# Patient Record
Sex: MALE | Race: ASIAN | NOT HISPANIC OR LATINO | URBAN - METROPOLITAN AREA
[De-identification: names, ages, dates, MRNs, and addresses within clinical notes are randomized per-mention and may not be internally consistent; named-entity substitution may affect disease eponyms.]

---

## 2018-03-07 ENCOUNTER — EMERGENCY (EMERGENCY)
Facility: HOSPITAL | Age: 48
LOS: 1 days | Discharge: HOME | End: 2018-03-07
Attending: EMERGENCY MEDICINE | Admitting: EMERGENCY MEDICINE
Payer: SELF-PAY

## 2018-03-07 VITALS
HEART RATE: 81 BPM | DIASTOLIC BLOOD PRESSURE: 104 MMHG | OXYGEN SATURATION: 100 % | RESPIRATION RATE: 16 BRPM | SYSTOLIC BLOOD PRESSURE: 146 MMHG

## 2018-03-07 VITALS
DIASTOLIC BLOOD PRESSURE: 95 MMHG | TEMPERATURE: 98 F | RESPIRATION RATE: 17 BRPM | HEART RATE: 86 BPM | OXYGEN SATURATION: 100 % | SYSTOLIC BLOOD PRESSURE: 149 MMHG

## 2018-03-07 PROCEDURE — 99053 MED SERV 10PM-8AM 24 HR FAC: CPT

## 2018-03-07 PROCEDURE — 99284 EMERGENCY DEPT VISIT MOD MDM: CPT | Mod: 25

## 2018-03-07 RX ORDER — CYCLOBENZAPRINE HYDROCHLORIDE 10 MG/1
1 TABLET, FILM COATED ORAL
Qty: 21 | Refills: 0 | OUTPATIENT
Start: 2018-03-07 | End: 2018-03-13

## 2018-03-07 RX ORDER — KETOROLAC TROMETHAMINE 30 MG/ML
1 SYRINGE (ML) INJECTION
Qty: 21 | Refills: 0 | OUTPATIENT
Start: 2018-03-07 | End: 2018-03-13

## 2018-03-07 RX ORDER — ACETAMINOPHEN 500 MG
975 TABLET ORAL ONCE
Qty: 0 | Refills: 0 | Status: COMPLETED | OUTPATIENT
Start: 2018-03-07 | End: 2018-03-07

## 2018-03-07 RX ORDER — KETOROLAC TROMETHAMINE 30 MG/ML
30 SYRINGE (ML) INJECTION ONCE
Qty: 0 | Refills: 0 | Status: DISCONTINUED | OUTPATIENT
Start: 2018-03-07 | End: 2018-03-07

## 2018-03-07 RX ORDER — MORPHINE SULFATE 50 MG/1
2 CAPSULE, EXTENDED RELEASE ORAL ONCE
Qty: 0 | Refills: 0 | Status: DISCONTINUED | OUTPATIENT
Start: 2018-03-07 | End: 2018-03-07

## 2018-03-07 RX ORDER — KETOROLAC TROMETHAMINE 30 MG/ML
60 SYRINGE (ML) INJECTION ONCE
Qty: 0 | Refills: 0 | Status: DISCONTINUED | OUTPATIENT
Start: 2018-03-07 | End: 2018-03-07

## 2018-03-07 RX ADMIN — MORPHINE SULFATE 2 MILLIGRAM(S): 50 CAPSULE, EXTENDED RELEASE ORAL at 10:01

## 2018-03-07 RX ADMIN — Medication 975 MILLIGRAM(S): at 08:44

## 2018-03-07 RX ADMIN — MORPHINE SULFATE 2 MILLIGRAM(S): 50 CAPSULE, EXTENDED RELEASE ORAL at 10:20

## 2018-03-07 RX ADMIN — Medication 30 MILLIGRAM(S): at 10:01

## 2018-03-07 RX ADMIN — Medication 30 MILLIGRAM(S): at 08:44

## 2018-03-07 NOTE — ED PROVIDER NOTE - PROGRESS NOTE DETAILS
moving more freely, able to sit up w legs on side of bed, still in pain, will give 2mg morphine , reeval pt able to walk in ED w/o assistance, fu with ortho/pmd, work note Pt able to walk, good distal sensation in bilateral legs and feet, feels significantly improved. Will provide week long prescription for flexeril / toradol po; f/u pmd, return precautions advised. USAMA Cruz PGY1

## 2018-03-07 NOTE — ED PROVIDER NOTE - NS ED ROS FT
General: No fevers / chills  HENT: No ear pain, runny nose, or sore throat  Eyes: No visual changes  CP: No chest pain, palpitations, or light headedness  Resp: No shortness of breath, no cough  GI: No abdominal pain, diarrhea, constipation, nausea, or vomiting  : No dysuria or hematuria  Neuro: No numbness, tingling, or weakness  Endo: No hx of diabetes  Heme: No hx of easy bleeding or bruising

## 2018-03-07 NOTE — ED PROVIDER NOTE - ATTENDING CONTRIBUTION TO CARE
Attending Attestation: Dr. Angel  I have personally performed a history and physical examination of the patient and discussed management with the resident as well as the patient.  I reviewed the resident's note and agree with the documented findings and plan of care.  I have authored and modified critical sections of the Provider Note, including but not limited to HPI, Physical Exam and MDM.  dr rashmi white  45 yo male bending down yesterday felt pain rt side of back, co stiffness pain, no incontinence, no perineal anesthesia, no le paresthesias, worse w mvmt.  PE sitting up nad, pain w mvmt, no midline no bony tender, reproducible lat lumbar tender, inc w RLE mvmt, distal nvi  imp/plan back pain, toradol, valium, reeval, if improved dc home on toradol, flexeril fu with pmd

## 2018-03-07 NOTE — ED PROVIDER NOTE - MEDICAL DECISION MAKING DETAILS
47M o/w healthy presenting with acute onset low back strain, has tried 400mg advil at home with no relief. Does appear significantly uncomfortable, will dose with toradol, valium, apap, and reassess. Do not suspect cord impingement or any fractures based on physical examination and history at this time.

## 2018-03-07 NOTE — ED ADULT TRIAGE NOTE - CHIEF COMPLAINT QUOTE
as per pt, he was trying to put on his socks and felt the muscle pulled and since then pt. c/o pain on mid lower back, non-radiating. denies any numbness/weakness, c/o difficulty walking this morning due to pain. denies any PMHx

## 2018-03-07 NOTE — ED PROVIDER NOTE - PHYSICAL EXAMINATION
Gen: NAD, uncomfortable appearing, non-toxic, conversational  Eyes: PERRL, EOMI   HENT: Normocephalic, atraumatic. External ears normal, no rhinorrhea, moist mucous membranes.   CV: RRR, no M/R/G  Resp: CTAB, non-labored, speaking without difficulty on room air  Abd: soft, non tender, non rigid, no guarding or rebound tenderness  Back: No CVAT bilaterally, no midline ttp, +paraspinal lumbar/sacral tenderness, +increased pain with leg raise bilaterally, pain reproduced with leaning forward   Skin: dry, wwp   Neuro: AOx3, speech is fluent and appropriate, motor 5/5 & symmetric in BLE, sensation intact  Psych: Mood concerned, affect euthymic

## 2019-04-05 ENCOUNTER — INPATIENT (INPATIENT)
Facility: HOSPITAL | Age: 49
LOS: 2 days | Discharge: HOME | End: 2019-04-08
Attending: STUDENT IN AN ORGANIZED HEALTH CARE EDUCATION/TRAINING PROGRAM | Admitting: STUDENT IN AN ORGANIZED HEALTH CARE EDUCATION/TRAINING PROGRAM
Payer: COMMERCIAL

## 2019-04-05 VITALS
RESPIRATION RATE: 18 BRPM | HEART RATE: 95 BPM | OXYGEN SATURATION: 99 % | TEMPERATURE: 99 F | DIASTOLIC BLOOD PRESSURE: 115 MMHG | SYSTOLIC BLOOD PRESSURE: 165 MMHG

## 2019-04-05 DIAGNOSIS — Z29.9 ENCOUNTER FOR PROPHYLACTIC MEASURES, UNSPECIFIED: ICD-10-CM

## 2019-04-05 DIAGNOSIS — E11.9 TYPE 2 DIABETES MELLITUS WITHOUT COMPLICATIONS: ICD-10-CM

## 2019-04-05 DIAGNOSIS — R51 HEADACHE: ICD-10-CM

## 2019-04-05 DIAGNOSIS — F10.10 ALCOHOL ABUSE, UNCOMPLICATED: ICD-10-CM

## 2019-04-05 DIAGNOSIS — R25.1 TREMOR, UNSPECIFIED: ICD-10-CM

## 2019-04-05 DIAGNOSIS — E11.65 TYPE 2 DIABETES MELLITUS WITH HYPERGLYCEMIA: ICD-10-CM

## 2019-04-05 DIAGNOSIS — R07.89 OTHER CHEST PAIN: ICD-10-CM

## 2019-04-05 DIAGNOSIS — I10 ESSENTIAL (PRIMARY) HYPERTENSION: ICD-10-CM

## 2019-04-05 LAB
ALBUMIN SERPL ELPH-MCNC: 4.5 G/DL — SIGNIFICANT CHANGE UP (ref 3.3–5)
ALP SERPL-CCNC: 103 U/L — SIGNIFICANT CHANGE UP (ref 40–120)
ALT FLD-CCNC: 28 U/L — SIGNIFICANT CHANGE UP (ref 4–41)
ANION GAP SERPL CALC-SCNC: 16 MMO/L — HIGH (ref 7–14)
APPEARANCE UR: CLEAR — SIGNIFICANT CHANGE UP
AST SERPL-CCNC: 23 U/L — SIGNIFICANT CHANGE UP (ref 4–40)
B-OH-BUTYR SERPL-SCNC: 0.7 MMOL/L — HIGH (ref 0–0.4)
BACTERIA # UR AUTO: NEGATIVE — SIGNIFICANT CHANGE UP
BASE EXCESS BLDV CALC-SCNC: 7.7 MMOL/L — SIGNIFICANT CHANGE UP
BASOPHILS # BLD AUTO: 0.03 K/UL — SIGNIFICANT CHANGE UP (ref 0–0.2)
BASOPHILS NFR BLD AUTO: 0.4 % — SIGNIFICANT CHANGE UP (ref 0–2)
BILIRUB SERPL-MCNC: 0.6 MG/DL — SIGNIFICANT CHANGE UP (ref 0.2–1.2)
BILIRUB UR-MCNC: NEGATIVE — SIGNIFICANT CHANGE UP
BLOOD GAS VENOUS - CREATININE: 0.52 MG/DL — SIGNIFICANT CHANGE UP (ref 0.5–1.3)
BLOOD UR QL VISUAL: NEGATIVE — SIGNIFICANT CHANGE UP
BUN SERPL-MCNC: 9 MG/DL — SIGNIFICANT CHANGE UP (ref 7–23)
CALCIUM SERPL-MCNC: 9.6 MG/DL — SIGNIFICANT CHANGE UP (ref 8.4–10.5)
CHLORIDE BLDV-SCNC: 94 MMOL/L — LOW (ref 96–108)
CHLORIDE SERPL-SCNC: 88 MMOL/L — LOW (ref 98–107)
CO2 SERPL-SCNC: 26 MMOL/L — SIGNIFICANT CHANGE UP (ref 22–31)
COLOR SPEC: SIGNIFICANT CHANGE UP
CREAT SERPL-MCNC: 0.53 MG/DL — SIGNIFICANT CHANGE UP (ref 0.5–1.3)
EOSINOPHIL # BLD AUTO: 0.02 K/UL — SIGNIFICANT CHANGE UP (ref 0–0.5)
EOSINOPHIL NFR BLD AUTO: 0.3 % — SIGNIFICANT CHANGE UP (ref 0–6)
GAS PNL BLDV: 128 MMOL/L — LOW (ref 136–146)
GLUCOSE BLDV-MCNC: 343 — HIGH (ref 70–99)
GLUCOSE SERPL-MCNC: 323 MG/DL — HIGH (ref 70–99)
GLUCOSE UR-MCNC: >1000 — HIGH
HBA1C BLD-MCNC: 12.9 % — HIGH (ref 4–5.6)
HCO3 BLDV-SCNC: 28 MMOL/L — HIGH (ref 20–27)
HCT VFR BLD CALC: 49.5 % — SIGNIFICANT CHANGE UP (ref 39–50)
HCT VFR BLDV CALC: 53.3 % — HIGH (ref 39–51)
HGB BLD-MCNC: 17 G/DL — SIGNIFICANT CHANGE UP (ref 13–17)
HGB BLDV-MCNC: 17.4 G/DL — HIGH (ref 13–17)
HYALINE CASTS # UR AUTO: NEGATIVE — SIGNIFICANT CHANGE UP
IMM GRANULOCYTES NFR BLD AUTO: 1.1 % — SIGNIFICANT CHANGE UP (ref 0–1.5)
KETONES UR-MCNC: SIGNIFICANT CHANGE UP
LACTATE BLDV-MCNC: 2 MMOL/L — SIGNIFICANT CHANGE UP (ref 0.5–2)
LEUKOCYTE ESTERASE UR-ACNC: NEGATIVE — SIGNIFICANT CHANGE UP
LYMPHOCYTES # BLD AUTO: 2.35 K/UL — SIGNIFICANT CHANGE UP (ref 1–3.3)
LYMPHOCYTES # BLD AUTO: 30.9 % — SIGNIFICANT CHANGE UP (ref 13–44)
MAGNESIUM SERPL-MCNC: 2.1 MG/DL — SIGNIFICANT CHANGE UP (ref 1.6–2.6)
MCHC RBC-ENTMCNC: 30.3 PG — SIGNIFICANT CHANGE UP (ref 27–34)
MCHC RBC-ENTMCNC: 34.3 % — SIGNIFICANT CHANGE UP (ref 32–36)
MCV RBC AUTO: 88.2 FL — SIGNIFICANT CHANGE UP (ref 80–100)
MONOCYTES # BLD AUTO: 0.53 K/UL — SIGNIFICANT CHANGE UP (ref 0–0.9)
MONOCYTES NFR BLD AUTO: 7 % — SIGNIFICANT CHANGE UP (ref 2–14)
NEUTROPHILS # BLD AUTO: 4.6 K/UL — SIGNIFICANT CHANGE UP (ref 1.8–7.4)
NEUTROPHILS NFR BLD AUTO: 60.3 % — SIGNIFICANT CHANGE UP (ref 43–77)
NITRITE UR-MCNC: NEGATIVE — SIGNIFICANT CHANGE UP
NRBC # FLD: 0 K/UL — SIGNIFICANT CHANGE UP (ref 0–0)
PCO2 BLDV: 56 MMHG — HIGH (ref 41–51)
PH BLDV: 7.39 PH — SIGNIFICANT CHANGE UP (ref 7.32–7.43)
PH UR: 7 — SIGNIFICANT CHANGE UP (ref 5–8)
PLATELET # BLD AUTO: 200 K/UL — SIGNIFICANT CHANGE UP (ref 150–400)
PMV BLD: 10 FL — SIGNIFICANT CHANGE UP (ref 7–13)
PO2 BLDV: 25 MMHG — LOW (ref 35–40)
POTASSIUM BLDV-SCNC: 4 MMOL/L — SIGNIFICANT CHANGE UP (ref 3.4–4.5)
POTASSIUM SERPL-MCNC: 4.2 MMOL/L — SIGNIFICANT CHANGE UP (ref 3.5–5.3)
POTASSIUM SERPL-SCNC: 4.2 MMOL/L — SIGNIFICANT CHANGE UP (ref 3.5–5.3)
PROT SERPL-MCNC: 7.9 G/DL — SIGNIFICANT CHANGE UP (ref 6–8.3)
PROT UR-MCNC: 20 — SIGNIFICANT CHANGE UP
RBC # BLD: 5.61 M/UL — SIGNIFICANT CHANGE UP (ref 4.2–5.8)
RBC # FLD: 11.9 % — SIGNIFICANT CHANGE UP (ref 10.3–14.5)
RBC CASTS # UR COMP ASSIST: SIGNIFICANT CHANGE UP (ref 0–?)
SAO2 % BLDV: 41.3 % — LOW (ref 60–85)
SODIUM SERPL-SCNC: 130 MMOL/L — LOW (ref 135–145)
SP GR SPEC: > 1.04 — HIGH (ref 1–1.04)
SQUAMOUS # UR AUTO: SIGNIFICANT CHANGE UP
TROPONIN T, HIGH SENSITIVITY: < 6 NG/L — SIGNIFICANT CHANGE UP (ref ?–14)
UROBILINOGEN FLD QL: NORMAL — SIGNIFICANT CHANGE UP
WBC # BLD: 7.61 K/UL — SIGNIFICANT CHANGE UP (ref 3.8–10.5)
WBC # FLD AUTO: 7.61 K/UL — SIGNIFICANT CHANGE UP (ref 3.8–10.5)
WBC UR QL: SIGNIFICANT CHANGE UP (ref 0–?)

## 2019-04-05 PROCEDURE — 71045 X-RAY EXAM CHEST 1 VIEW: CPT | Mod: 26

## 2019-04-05 PROCEDURE — 99223 1ST HOSP IP/OBS HIGH 75: CPT | Mod: GC

## 2019-04-05 RX ORDER — SODIUM CHLORIDE 9 MG/ML
1000 INJECTION INTRAMUSCULAR; INTRAVENOUS; SUBCUTANEOUS ONCE
Qty: 0 | Refills: 0 | Status: COMPLETED | OUTPATIENT
Start: 2019-04-05 | End: 2019-04-05

## 2019-04-05 RX ORDER — ENOXAPARIN SODIUM 100 MG/ML
40 INJECTION SUBCUTANEOUS EVERY 24 HOURS
Qty: 0 | Refills: 0 | Status: DISCONTINUED | OUTPATIENT
Start: 2019-04-05 | End: 2019-04-08

## 2019-04-05 RX ORDER — LISINOPRIL 2.5 MG/1
5 TABLET ORAL DAILY
Qty: 0 | Refills: 0 | Status: DISCONTINUED | OUTPATIENT
Start: 2019-04-05 | End: 2019-04-08

## 2019-04-05 RX ORDER — INFLUENZA VIRUS VACCINE 15; 15; 15; 15 UG/.5ML; UG/.5ML; UG/.5ML; UG/.5ML
0.5 SUSPENSION INTRAMUSCULAR ONCE
Qty: 0 | Refills: 0 | Status: DISCONTINUED | OUTPATIENT
Start: 2019-04-05 | End: 2019-04-08

## 2019-04-05 RX ORDER — SODIUM CHLORIDE 9 MG/ML
1000 INJECTION, SOLUTION INTRAVENOUS
Qty: 0 | Refills: 0 | Status: DISCONTINUED | OUTPATIENT
Start: 2019-04-05 | End: 2019-04-08

## 2019-04-05 RX ORDER — INSULIN LISPRO 100/ML
VIAL (ML) SUBCUTANEOUS
Qty: 0 | Refills: 0 | Status: DISCONTINUED | OUTPATIENT
Start: 2019-04-05 | End: 2019-04-05

## 2019-04-05 RX ORDER — INSULIN LISPRO 100/ML
VIAL (ML) SUBCUTANEOUS
Qty: 0 | Refills: 0 | Status: DISCONTINUED | OUTPATIENT
Start: 2019-04-05 | End: 2019-04-08

## 2019-04-05 RX ORDER — INSULIN LISPRO 100/ML
VIAL (ML) SUBCUTANEOUS AT BEDTIME
Qty: 0 | Refills: 0 | Status: DISCONTINUED | OUTPATIENT
Start: 2019-04-05 | End: 2019-04-05

## 2019-04-05 RX ORDER — INSULIN LISPRO 100/ML
VIAL (ML) SUBCUTANEOUS AT BEDTIME
Qty: 0 | Refills: 0 | Status: DISCONTINUED | OUTPATIENT
Start: 2019-04-05 | End: 2019-04-08

## 2019-04-05 RX ORDER — INSULIN LISPRO 100/ML
2 VIAL (ML) SUBCUTANEOUS ONCE
Qty: 0 | Refills: 0 | Status: COMPLETED | OUTPATIENT
Start: 2019-04-05 | End: 2019-04-05

## 2019-04-05 RX ADMIN — LISINOPRIL 5 MILLIGRAM(S): 2.5 TABLET ORAL at 20:09

## 2019-04-05 RX ADMIN — SODIUM CHLORIDE 100 MILLILITER(S): 9 INJECTION, SOLUTION INTRAVENOUS at 21:43

## 2019-04-05 RX ADMIN — SODIUM CHLORIDE 1000 MILLILITER(S): 9 INJECTION INTRAMUSCULAR; INTRAVENOUS; SUBCUTANEOUS at 15:22

## 2019-04-05 RX ADMIN — Medication 25 MILLIGRAM(S): at 15:57

## 2019-04-05 RX ADMIN — SODIUM CHLORIDE 1000 MILLILITER(S): 9 INJECTION INTRAMUSCULAR; INTRAVENOUS; SUBCUTANEOUS at 16:58

## 2019-04-05 RX ADMIN — Medication 2 UNIT(S): at 20:06

## 2019-04-05 NOTE — H&P ADULT - ASSESSMENT
49 yo M PMHx of HTN, HLD, T2DM p/w headache, blurry vision, found to have uncontrolled T2DM (a1c 12.9%) w/ FS 300s and r/o etOH withdrawal

## 2019-04-05 NOTE — ED ADULT NURSE NOTE - OBJECTIVE STATEMENT
Pt rcvd to room 26 c/o dizziness, neck pain/back pain x 3 weeks. Also reports having tremors. Pt states he does not drink daily but when he does, he will drink heavily for 3-4 days consecutively. As per pt hasn't drank in 2 weeks but reports having 2 beers last night. Denies vision changes, HA at this time. Tremors felt only at fingertips. Aox3, ambulatory. Denies pmhx. Hypertensive at this time. No neuro deficits observed. Awaiting MD nixon. Sinus tachycardia on cardiac monitor. Respirations even/unlabored. Will continue to monitor.

## 2019-04-05 NOTE — H&P ADULT - HISTORY OF PRESENT ILLNESS
:  b48 y/o M w/ PMH HTN, HLD p/w headache, dizziness over last 3 weeks. Patient reports he has been having "dizziness in his head" over last 3 weeks. He states this feeling tends to occur when he walks. He reports a pain in the back of his head which migrates up to the top of his head. These symptoms have been occurring nearly every day over the last several weeks. He has also been having blurry vision which has been occurring intermittently. Denies room spinning sensation, tinnitus, or focal weakness. He reports some chest pain intermittently, not usually associated with exertion, accompanied by SOB. 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 Reports multi-day binge drinking episodes, about 1 pint of vodka and 1-2 6packs of beers 3-4x per month. He denies tobacco use. Lives with wife at home. Last drink yesterday evening, had 2 beers. Had an episode of vomiting a few days ago. Denies any diarrhea, nausea, dysuria.     In the ED:  Vitals: afeb, /115, RR 18, spO2 99% on RA  Labs: WBC 7.6, PLAT 200, Cr 0.53, , AG 16, A1C 12.9%, Urine glucose >1000  Imaging: CXR clear  Given Librium 25mg x1 and 2L NS in ED 47 y/o M w/ PMH HTN, HLD p/w headache, dizziness over last 3 weeks. Patient reports he has been having "dizziness in his head" over last 3 weeks. He states this feeling tends to occur when he walks. He reports a pain in the back of his head which migrates up to the top of his head. These symptoms have been occurring nearly every day over the last several weeks. He has also been having blurry vision which has been occurring intermittently. Denies room spinning sensation, tinnitus, or focal weakness. He reports some chest pain intermittently, not usually associated with exertion, accompanied by SOB. 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 Reports multi-day binge drinking episodes, about 1 pint of vodka and 1-2 6packs of beers 3-4x per month. He denies tobacco use. Lives with wife at home. Last drink yesterday evening, had 2 beers. Had an episode of vomiting a few days ago. Denies any diarrhea, nausea, dysuria.     In the ED:  Vitals: afeb, /115, RR 18, spO2 99% on RA  Labs: WBC 7.6, PLAT 200, Cr 0.53, , AG 16, A1C 12.9%, Urine glucose >1000  Imaging: CXR clear  Given Librium 25mg x1 and 2L NS in ED 47 y/o M w/ PMH HTN, HLD p/w headache, dizziness over last 3 weeks. Patient reports he has been having "dizziness in his head" over last 3 weeks. He states this feeling tends to occur when he walks. He reports a pain in the back of his head which migrates up to the top of his head. These symptoms have been occurring nearly every day over the last several weeks. He has also been having blurry vision which has been occurring intermittently. Denies room spinning sensation, tinnitus, or focal weakness. He reports some chest pain intermittently, not usually associated with exertion, accompanied by SOB. 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 Reports multi-day binge drinking episodes, about 1 pint of vodka and 1-2 6packs of beers 3-4x per month. He denies tobacco use. Lives with wife at home. Last drink yesterday evening, had 2 beers. Had an episode of vomiting a few days ago. Denies any diarrhea, nausea, dysuria.     Additionally, patient reports frequent micturition and excessive thirst over the past months to years; more pronounced at times.      In the ED:  Vitals: afeb, /115, RR 18, spO2 99% on RA  Labs: WBC 7.6, PLAT 200, Cr 0.53, , AG 16, A1C 12.9%, Urine glucose >1000  Imaging: CXR clear  Given Librium 25mg x1 and 2L NS in ED

## 2019-04-05 NOTE — H&P ADULT - PROBLEM SELECTOR PLAN 3
Has been having R sided chest pain radiating to neck intermittently, not usually associated with exertion. EKG showing LVH and j point elevation in V2-V3  - hsTrop pending Has been having R sided chest pain radiating to neck intermittently, not usually associated with exertion. EKG showing LVH and j point elevation in V2-V3  - hsTrop pending  - TTE pending

## 2019-04-05 NOTE — ED ADULT TRIAGE NOTE - CHIEF COMPLAINT QUOTE
ambulatory to ED pt reports " I feel like I ma having dizziness and shaking a lot " reports symptoms for 1 week, states was drinking heavily  3 weeks ago then quit, last drink last night 2 beers,  pt reports subjective fever at home and polydipsia

## 2019-04-05 NOTE — ED PROVIDER NOTE - CLINICAL SUMMARY MEDICAL DECISION MAKING FREE TEXT BOX
48M w/ trembling ongoing x 3 weeks, in setting of alcohol use however noted to not occur after alcohol cessation, with hx of poor outpt f/u, tachycardic on initial exam, will eval w/ labs and rehydrate, mild extremity tremors on exam without tongue fasciculations, possibly pt with minor withdrawals and alcohol dependence, will reassess after initial w/u, no focal neuro findings on exam and gait normal and ambulatory without assistance, unlikely central cause

## 2019-04-05 NOTE — H&P ADULT - PROBLEM SELECTOR PLAN 4
Last drink yesterday evening; no hx of seizures, intubation, or admissions for etOH withdrawal  - Low risk CIWA - symptom triggered  - f/u vit b12/folate/thiamine Last drink yesterday evening; no hx of seizures, intubation, or admissions for etOH withdrawal  - Low risk CIWA - symptom triggered  - f/u vit b12/folate/thiamine  - Start thiamine, folate, and MVA supplementation Last drink yesterday evening; no hx of seizures, intubation, or admissions for etOH withdrawal  - Low risk CIWA - symptom triggered  - f/u AM lab-work - including vit b12/folate/thiamine  - Start PO thiamine, folate, and MVA supplementation  - IVF hydration Last drink yesterday evening; no hx of seizures, intubation, or admissions for etOH withdrawal  - Low risk CIWA - symptom triggered  - f/u AM lab-work - including vit b12/folate/thiamine  - Start PO thiamine, folate, and MVA supplementation  - IVF hydration  - Continue to encourage alcohol cessation

## 2019-04-05 NOTE — H&P ADULT - PROBLEM SELECTOR PLAN 1
Patient w/ headaches over last several weeks along with other non-specific complaints. No focal deficits on physical exam. Low suspicion for stroke. 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   - Urine/Serum tox screen  - Heavy metal screening  - B12/Folate/Thiamine levels Patient w/ headaches over last several weeks along with other non-specific complaints. No focal deficits on physical exam. Low suspicion for stroke. 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   - complains of increased thirst and micturition, some facial fullness  - Urine/Serum tox screen  - Heavy metal screening  - B12/Folate/Thiamine levels Type-II diabetes mellitus  Hgb-A1c = 12.9%, FS 300s w/ UA showing glucosuria >1000. AG 16,   No history of DM in the past, although recent months to years with increased thirst, increased frequency of micturition, excessive tiredness at times, blurred vision  has not seen a physician in years  will obtain urine ketones and serum BHB. Low suspicion for DKA given normal serum bicarb and pH.   - ISS  - FS premeals (consistent carb) /bedtime  - Endo consult  - f/u urine ketones and BHB  - will need referral to ophthalmology and podiatry upon discharge

## 2019-04-05 NOTE — ED PROVIDER NOTE - NEUROLOGICAL, MLM
6/23/2017 4:13 PM 
 
Patient:  Анна Bowser. YOB: 1946 Date of Visit: 6/22/2017 Dear Cruz Murguia MD 
[address] [id number] VIA Facsimile: [phone] 
 : 
Mr. Alberto Vickers is a 66 yo M hospitalization for malignant hypertension in past and was found to be in atrial fibrillation, which was a new diagnosis for him. He had apparently run out of his blood pressure medications and his blood pressure was very elevated. Echo 12/27/2016 was normal with an EF of 55-60% and mild mitral regurgitation. Since his last visit, overall he has been doing well. He denies any palpitations. No chest pain, no shortness of breath. He has been complaint with his medications. When he checks his blood pressure at home, it has been in the 434X systolic. When he has had his blood pressure checked at his doctor's office and here today, it has been more elevated and is 160/94 here. He is compensated on exam with clear lungs. He has trace lower extremity edema. Assessment and Plan: 1. Atrial fibrillation. Continues well controlled on beta-blocker and no changes made. 2. Chronic anticoagulation. No bleeding issues on Coumadin. 3. 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 He is being followed by urology. Consider nephrology if indicated. 4. Essential hypertension. Blood pressure is labile and probably has some degree of white coat hypertension. His blood pressure at home has overall been okay in the 140s and will continue his current regimen of Norvasc, Coreg, Lisinopril and Hydralazine. 5. Hypokalemia. Occurred in the past with Hydrochlorothiazide and would avoid this. If you have questions, please do not hesitate to call me. Sincerely, Ameena Deleon MD 
 
 Alert and oriented, no focal deficits, no motor or sensory deficits. No gross neurologic deficits, CN II-XII intact, no facial asymmetry, no dysarthria, no dysdiadochokinesia, strength equal in all four extremities, no drift, normal heel shin, no gait abnormality

## 2019-04-05 NOTE — H&P ADULT - NSHPPHYSICALEXAM_GEN_ALL_CORE
PHYSICAL EXAM:  GENERAL: NAD  HEAD:  Atraumatic, Normocephalic  EYES: EOMI, PERRLA, no nystagmus  ENMT: No tonsillar erythema, exudates, or enlargement; Moist mucous membranes  NECK: Supple, No JVD  CHEST/LUNG: Clear to auscultation bilaterally; No rales, rhonchi, wheezing, or rubs  HEART: Regular rate and rhythm; No murmurs, rubs, or gallops  ABDOMEN: Soft, Nontender, Nondistended; Bowel sounds present  EXTREMITIES:  No clubbing, cyanosis, or edema  SKIN: No rashes or lesions  NERVOUS SYSTEM:  Alert & Oriented X3, Motor Strength 5/5 B/L upper and lower extremities PHYSICAL EXAM:  GENERAL: NAD  HEAD:  Atraumatic, Normocephalic  EYES: EOMI, PERRL, no nystagmus  ENMT: No tonsillar erythema, exudates, or enlargement; Moist mucous membranes  NECK: Supple, No JVD  CHEST/LUNG: Clear to auscultation bilaterally; No rales, rhonchi, wheezing, or rubs  HEART: Regular rate and rhythm; No murmurs, rubs, or gallops  ABDOMEN: Soft, Nontender, Nondistended; Bowel sounds present  EXTREMITIES:  No clubbing, cyanosis, or edema  SKIN: No rashes or lesions  NERVOUS SYSTEM:  Alert & Oriented X3, Motor Strength 5/5 B/L upper and lower extremities PHYSICAL EXAM:  GENERAL: NAD  HEAD:  Atraumatic, Normocephalic  EYES: EOMI, PERRL, no nystagmus  ENMT: No tonsillar erythema, exudates; Moist mucous membranes, nose disproportionately large compared w/ other facial features  NECK: Supple, No JVD  CHEST/LUNG: Clear to auscultation bilaterally; No rales, rhonchi, wheezing, or rubs  HEART: Regular rate and rhythm; No murmurs, rubs, or gallops  ABDOMEN: Soft, Nontender, Nondistended; Bowel sounds present  EXTREMITIES:  No clubbing, cyanosis, or edema  SKIN: No rashes or lesions  NERVOUS SYSTEM:  Alert & Oriented X3, Motor Strength 5/5 B/L upper and lower extremities

## 2019-04-05 NOTE — PATIENT PROFILE ADULT - ANY IMPAIRED UPPER EXTREMITY FUNCTION WITHIN A WEEK PRIOR TO ADMISSION RELATED TO?
Called patient with lab results.  Biopsies with no sign of malignancy and elevated eosinophils.  It is likely that this represents EoE.  I will prescribe swallowed budesonide for him.  Then repeat EGD with MAC in 6-8 weeks for reassessment, possible dilation if needed.  He is in agreement with the plan.    Maciej Harkins MD     no

## 2019-04-05 NOTE — H&P ADULT - NSHPLABSRESULTS_GEN_ALL_CORE
04-05    130<L>  |  88<L>  |  9   ----------------------------<  323<H>  4.2   |  26  |  0.53    Ca    9.6      05 Apr 2019 15:11  Mg     2.1     04-05    TPro  7.9  /  Alb  4.5  /  TBili  0.6  /  DBili  x   /  AST  23  /  ALT  28  /  AlkPhos  103  04-05                    Urinalysis Basic - ( 05 Apr 2019 16:13 )    Color: LIGHT YELLOW / Appearance: CLEAR / SG: > 1.040 / pH: 7.0  Gluc: >1000 / Ketone: SMALL  / Bili: NEGATIVE / Urobili: NORMAL   Blood: NEGATIVE / Protein: 20 / Nitrite: NEGATIVE   Leuk Esterase: NEGATIVE / RBC: 0-2 / WBC 0-2   Sq Epi: OCC / Non Sq Epi: x / Bacteria: NEGATIVE                              17.0   7.61  )-----------( 200      ( 05 Apr 2019 15:11 )             49.5     CAPILLARY BLOOD GLUCOSE      POCT Blood Glucose.: 364 mg/dL (05 Apr 2019 14:04) 04-05    130<L>  |  88<L>  |  9   ----------------------------<  323<H>  4.2   |  26  |  0.53    Ca    9.6      05 Apr 2019 15:11  Mg     2.1     04-05    TPro  7.9  /  Alb  4.5  /  TBili  0.6  /  DBili  x   /  AST  23  /  ALT  28  /  AlkPhos  103  04-05                    Urinalysis Basic - ( 05 Apr 2019 16:13 )    Color: LIGHT YELLOW / Appearance: CLEAR / SG: > 1.040 / pH: 7.0  Gluc: >1000 / Ketone: SMALL  / Bili: NEGATIVE / Urobili: NORMAL   Blood: NEGATIVE / Protein: 20 / Nitrite: NEGATIVE   Leuk Esterase: NEGATIVE / RBC: 0-2 / WBC 0-2   Sq Epi: OCC / Non Sq Epi: x / Bacteria: NEGATIVE                          17.0   7.61  )-----------( 200      ( 05 Apr 2019 15:11 )             49.5     CAPILLARY BLOOD GLUCOSE      POCT Blood Glucose.: 364 mg/dL (05 Apr 2019 14:04)

## 2019-04-05 NOTE — ED PROVIDER NOTE - ATTENDING CONTRIBUTION TO CARE
Patient is a 47 yo M with history of steady ETOH use, hx of pancreatitis, here for evaluation of feeling of 'head swinging' when he drinks and shaky afterwards. Patient states he drinks heavily 3-4 times a month and goes weeks without drinking at times. He states he had 2 beers last night and then prior to that he drank this past Sunday (5 days ago). He states he just got insurance, has not seen a physician in years and has an appointment for a new doctor on 5/18/19. He came in now because he doesn't feel well. Denies feeling 'head swinging', difficulty walking, fevers, chills, chest pain, abdominal pain, nausea, vomiting.     VS noted  Gen. no acute distress, Non toxic   HEENT: EOMI, mmm, mild tongue fasciculations  Lungs: CTAB/L no C/ W /R   CVS: tachycardia  Abd; Soft non tender, non distended   Ext: no edema  Skin: no rash  Neuro AAOx3, CN 2-12 intact, strength 5/5 in UE/ LE, gait normal, tandem gait normal, finger to nose normal, tremulous on holding hands out  a/p: ETOH dependence - patient appears to be mildly withdrawing. Will check labs, give librium and have SBIRT talk to patient.   - Kianna LENTZ

## 2019-04-05 NOTE — H&P ADULT - NSICDXPASTMEDICALHX_GEN_ALL_CORE_FT
PAST MEDICAL HISTORY:  High blood pressure By pt report; does not use any medications as of today (3/7/18)

## 2019-04-05 NOTE — H&P ADULT - NSHPREVIEWOFSYSTEMS_GEN_ALL_CORE
REVIEW OF SYSTEMS:  CONSTITUTIONAL: No fever, 10lb weight loss  EYES: +blurry vision  ENMT:  No difficulty hearing, tinnitus, vertigo  NECK: No pain or stiffness  RESPIRATORY: No cough, wheezing, or hemoptysis  CARDIOVASCULAR: No palpitations or leg swelling  GASTROINTESTINAL: No abdominal or epigastric pain. No diarrhea or constipation. No melena or hematochezia.  GENITOURINARY: +frequency  NEUROLOGICAL: No loss of strength  SKIN: No itching, burning, rashes, or lesions   LYMPH NODES: No enlarged glands  ENDOCRINE: No heat or cold intolerance  MUSCULOSKELETAL: No joint pain  PSYCHIATRIC: No depression, anxiety, mood swings, or difficulty sleeping REVIEW OF SYSTEMS:  CONSTITUTIONAL: No fever, 10lb weight loss  EYES: +blurry vision  ENMT:  No difficulty hearing, tinnitus, vertigo.  Puncture trauma to nose during a fall from a tree as a teenager  NECK: No pain or stiffness  RESPIRATORY: No cough, wheezing, or hemoptysis  CARDIOVASCULAR: No palpitations or leg swelling  GASTROINTESTINAL: No abdominal or epigastric pain. No diarrhea or constipation. No melena or hematochezia.  GENITOURINARY: +frequency  NEUROLOGICAL: No loss of strength  SKIN: No itching, burning, rashes, or lesions   LYMPH NODES: No enlarged glands  ENDOCRINE: No heat or cold intolerance  MUSCULOSKELETAL: No joint pain  PSYCHIATRIC: No depression, anxiety, mood swings, or difficulty sleeping

## 2019-04-05 NOTE — ED PROVIDER NOTE - CARE PLAN
Principal Discharge DX:	Tremors of nervous system  Secondary Diagnosis:	Diabetes  Secondary Diagnosis:	Alcohol dependence

## 2019-04-05 NOTE — H&P ADULT - PROBLEM SELECTOR PLAN 5
Uncontrolled to 160s/110s; not on any meds  - Start lisinopril 5mg daily  - Uptitrate as needed Uncontrolled to 160s/110s; not on any meds  - Start lisinopril 5mg daily  - Up-titrate as needed

## 2019-04-05 NOTE — ED ADULT NURSE REASSESSMENT NOTE - NS ED NURSE REASSESS COMMENT FT1
Pt continues hypertensive, MD Jimenez called, aware/notified. Awaiting med orders. Will continue to monitor.

## 2019-04-05 NOTE — H&P ADULT - NSICDXPASTSURGICALHX_GEN_ALL_CORE_FT
PAST SURGICAL HISTORY:  No significant past surgical history PAST SURGICAL HISTORY:  H/O nose injury ~ puncture wound - s/p surgical repair (teenage years)

## 2019-04-05 NOTE — H&P ADULT - PROBLEM SELECTOR PLAN 2
Uncontrolled; not on any meds and has not seen a physician in years  A1C 12.9%, FS 300s w/ UA showing glucosuria >1000. AG 16, will obtain urine ketones and serum BHB. Low suspicion for DKA given normal serum bicarb and pH.   - ISS  - FS premeals/bedtime  - Endo consult  - f/u urine ketones and BHB Type-II diabetes mellitus  Hgb-A1c = 12.9%, FS 300s w/ UA showing glucosuria >1000. AG 16,   No history of DM in the past, although recent months to years with increased thirst, increased frequency of micturition, excessive tiredness at times, blurred vision  has not seen a physician in years  will obtain urine ketones and serum BHB. Low suspicion for DKA given normal serum bicarb and pH.   - ISS  - FS premeals (consistent carb) /bedtime  - Endo consult  - f/u urine ketones and BHB  - will need referral to ophthalmology and podiatry upon discharge Patient w/ headaches over last several weeks along with other non-specific complaints. No focal deficits on physical exam. Low suspicion for stroke. 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   - complains of increased thirst and micturition, some facial fullness  - Urine/Serum tox screen  - Heavy metal screening  - B12/Folate/Thiamine levels Patient w/ headaches over last several weeks along with other non-specific complaints. No focal deficits on physical exam. Low suspicion for stroke. 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   - complains of increased thirst and micturition, some facial fullness  - Urine/Serum tox screen  - Heavy metal screening  - B12/Folate/Thiamine levels  - Ensure optimal hydration

## 2019-04-05 NOTE — ED PROVIDER NOTE - OBJECTIVE STATEMENT
48M hx of alcohol use, pancreatitis, presenting with 'trembling' of the body and hands, as well as pressure like headache with 'head swinging sensation' like dizziness, wherein it feels that the room is spinning but he does not feel off balance, symptoms ongoing x 3 weeks, notes that they are worse when he is drinking alcohol or right after finishing drinking. He notes that he has been drinking for years, large binges at a time over a few days- but has never had official diagnosis of withdrawal, never had seizures. No associated chest pain, sob or fevers/ chills with these symptoms. No recent travel. No LE edema. 48M hx of alcohol use, pancreatitis, presenting with 'trembling' of the body and hands, as well as pressure like headache with 'head swinging sensation' like dizziness, wherein it feels that the room is spinning but he does not feel off balance, symptoms ongoing x 3 weeks, notes that they are worse when he is drinking alcohol or right after finishing drinking. He notes that he has been drinking for years, large binges at a time over a few days- but has never had official diagnosis of withdrawal, never had seizures. No associated chest pain, sob or fevers/ chills with these symptoms. No recent travel. No LE edema. Last drink 2 beers yesterday night. 48M hx of alcohol use, pancreatitis, presenting with 'trembling' of the body and hands, as well as pressure like headache with 'head swinging sensation' like dizziness, wherein it feels that the room is spinning but he does not feel off balance, symptoms ongoing x 3 weeks, notes that they are worse when he is drinking alcohol or right after finishing drinking. He notes that he has been drinking for years, large binges at a time over a few days- but has never had official diagnosis of withdrawal, never had seizures. Notes trembling is not there if he is not drinking. No associated chest pain, sob or fevers/ chills with these symptoms. No recent travel. No LE edema. Last drink 2 beers yesterday night.

## 2019-04-06 DIAGNOSIS — E78.49 OTHER HYPERLIPIDEMIA: ICD-10-CM

## 2019-04-06 DIAGNOSIS — E11.65 TYPE 2 DIABETES MELLITUS WITH HYPERGLYCEMIA: ICD-10-CM

## 2019-04-06 DIAGNOSIS — Z87.828 PERSONAL HISTORY OF OTHER (HEALED) PHYSICAL INJURY AND TRAUMA: Chronic | ICD-10-CM

## 2019-04-06 DIAGNOSIS — E11.9 TYPE 2 DIABETES MELLITUS WITHOUT COMPLICATIONS: ICD-10-CM

## 2019-04-06 DIAGNOSIS — I10 ESSENTIAL (PRIMARY) HYPERTENSION: ICD-10-CM

## 2019-04-06 LAB
ALBUMIN SERPL ELPH-MCNC: 3.3 G/DL — SIGNIFICANT CHANGE UP (ref 3.3–5)
ALP SERPL-CCNC: 68 U/L — SIGNIFICANT CHANGE UP (ref 40–120)
ALT FLD-CCNC: 23 U/L — SIGNIFICANT CHANGE UP (ref 4–41)
ANION GAP SERPL CALC-SCNC: 12 MMO/L — SIGNIFICANT CHANGE UP (ref 7–14)
APTT BLD: 30.9 SEC — SIGNIFICANT CHANGE UP (ref 27.5–36.3)
AST SERPL-CCNC: 19 U/L — SIGNIFICANT CHANGE UP (ref 4–40)
BILIRUB SERPL-MCNC: 0.2 MG/DL — SIGNIFICANT CHANGE UP (ref 0.2–1.2)
BUN SERPL-MCNC: 12 MG/DL — SIGNIFICANT CHANGE UP (ref 7–23)
CALCIUM SERPL-MCNC: 8.1 MG/DL — LOW (ref 8.4–10.5)
CHLORIDE SERPL-SCNC: 98 MMOL/L — SIGNIFICANT CHANGE UP (ref 98–107)
CO2 SERPL-SCNC: 23 MMOL/L — SIGNIFICANT CHANGE UP (ref 22–31)
CREAT SERPL-MCNC: 0.61 MG/DL — SIGNIFICANT CHANGE UP (ref 0.5–1.3)
FOLATE SERPL-MCNC: > 20 NG/ML — HIGH (ref 4.7–20)
GLUCOSE SERPL-MCNC: 262 MG/DL — HIGH (ref 70–99)
HCT VFR BLD CALC: 45.7 % — SIGNIFICANT CHANGE UP (ref 39–50)
HGB BLD-MCNC: 15 G/DL — SIGNIFICANT CHANGE UP (ref 13–17)
INR BLD: 0.94 — SIGNIFICANT CHANGE UP (ref 0.88–1.17)
MAGNESIUM SERPL-MCNC: 2 MG/DL — SIGNIFICANT CHANGE UP (ref 1.6–2.6)
MCHC RBC-ENTMCNC: 30.4 PG — SIGNIFICANT CHANGE UP (ref 27–34)
MCHC RBC-ENTMCNC: 32.8 % — SIGNIFICANT CHANGE UP (ref 32–36)
MCV RBC AUTO: 92.7 FL — SIGNIFICANT CHANGE UP (ref 80–100)
NRBC # FLD: 0 K/UL — SIGNIFICANT CHANGE UP (ref 0–0)
PHOSPHATE SERPL-MCNC: 3.4 MG/DL — SIGNIFICANT CHANGE UP (ref 2.5–4.5)
PLATELET # BLD AUTO: 169 K/UL — SIGNIFICANT CHANGE UP (ref 150–400)
PMV BLD: 10.4 FL — SIGNIFICANT CHANGE UP (ref 7–13)
POTASSIUM SERPL-MCNC: 4.6 MMOL/L — SIGNIFICANT CHANGE UP (ref 3.5–5.3)
POTASSIUM SERPL-SCNC: 4.6 MMOL/L — SIGNIFICANT CHANGE UP (ref 3.5–5.3)
PROT SERPL-MCNC: 6.1 G/DL — SIGNIFICANT CHANGE UP (ref 6–8.3)
PROTHROM AB SERPL-ACNC: 10.4 SEC — SIGNIFICANT CHANGE UP (ref 9.8–13.1)
RBC # BLD: 4.93 M/UL — SIGNIFICANT CHANGE UP (ref 4.2–5.8)
RBC # FLD: 11.9 % — SIGNIFICANT CHANGE UP (ref 10.3–14.5)
SODIUM SERPL-SCNC: 133 MMOL/L — LOW (ref 135–145)
VIT B12 SERPL-MCNC: 862 PG/ML — SIGNIFICANT CHANGE UP (ref 200–900)
WBC # BLD: 6.4 K/UL — SIGNIFICANT CHANGE UP (ref 3.8–10.5)
WBC # FLD AUTO: 6.4 K/UL — SIGNIFICANT CHANGE UP (ref 3.8–10.5)

## 2019-04-06 PROCEDURE — 99223 1ST HOSP IP/OBS HIGH 75: CPT

## 2019-04-06 PROCEDURE — 99233 SBSQ HOSP IP/OBS HIGH 50: CPT

## 2019-04-06 RX ORDER — INSULIN LISPRO 100/ML
3 VIAL (ML) SUBCUTANEOUS
Qty: 0 | Refills: 0 | Status: DISCONTINUED | OUTPATIENT
Start: 2019-04-06 | End: 2019-04-07

## 2019-04-06 RX ORDER — INSULIN GLARGINE 100 [IU]/ML
11 INJECTION, SOLUTION SUBCUTANEOUS AT BEDTIME
Qty: 0 | Refills: 0 | Status: DISCONTINUED | OUTPATIENT
Start: 2019-04-06 | End: 2019-04-08

## 2019-04-06 RX ORDER — THIAMINE MONONITRATE (VIT B1) 100 MG
100 TABLET ORAL DAILY
Qty: 0 | Refills: 0 | Status: DISCONTINUED | OUTPATIENT
Start: 2019-04-06 | End: 2019-04-08

## 2019-04-06 RX ORDER — DEXTROSE 50 % IN WATER 50 %
15 SYRINGE (ML) INTRAVENOUS ONCE
Qty: 0 | Refills: 0 | Status: DISCONTINUED | OUTPATIENT
Start: 2019-04-06 | End: 2019-04-08

## 2019-04-06 RX ORDER — SODIUM CHLORIDE 9 MG/ML
1000 INJECTION, SOLUTION INTRAVENOUS
Qty: 0 | Refills: 0 | Status: DISCONTINUED | OUTPATIENT
Start: 2019-04-06 | End: 2019-04-08

## 2019-04-06 RX ORDER — DEXTROSE 50 % IN WATER 50 %
25 SYRINGE (ML) INTRAVENOUS ONCE
Qty: 0 | Refills: 0 | Status: DISCONTINUED | OUTPATIENT
Start: 2019-04-06 | End: 2019-04-08

## 2019-04-06 RX ORDER — ATORVASTATIN CALCIUM 80 MG/1
40 TABLET, FILM COATED ORAL AT BEDTIME
Qty: 0 | Refills: 0 | Status: DISCONTINUED | OUTPATIENT
Start: 2019-04-06 | End: 2019-04-08

## 2019-04-06 RX ORDER — DEXTROSE 50 % IN WATER 50 %
12.5 SYRINGE (ML) INTRAVENOUS ONCE
Qty: 0 | Refills: 0 | Status: DISCONTINUED | OUTPATIENT
Start: 2019-04-06 | End: 2019-04-08

## 2019-04-06 RX ORDER — GLUCAGON INJECTION, SOLUTION 0.5 MG/.1ML
1 INJECTION, SOLUTION SUBCUTANEOUS ONCE
Qty: 0 | Refills: 0 | Status: DISCONTINUED | OUTPATIENT
Start: 2019-04-06 | End: 2019-04-08

## 2019-04-06 RX ADMIN — LISINOPRIL 5 MILLIGRAM(S): 2.5 TABLET ORAL at 05:34

## 2019-04-06 RX ADMIN — INSULIN GLARGINE 11 UNIT(S): 100 INJECTION, SOLUTION SUBCUTANEOUS at 21:37

## 2019-04-06 RX ADMIN — Medication 100 MILLIGRAM(S): at 12:37

## 2019-04-06 RX ADMIN — ENOXAPARIN SODIUM 40 MILLIGRAM(S): 100 INJECTION SUBCUTANEOUS at 05:33

## 2019-04-06 RX ADMIN — Medication 3: at 17:30

## 2019-04-06 RX ADMIN — Medication 3: at 08:41

## 2019-04-06 RX ADMIN — Medication 1 TABLET(S): at 12:37

## 2019-04-06 RX ADMIN — ATORVASTATIN CALCIUM 40 MILLIGRAM(S): 80 TABLET, FILM COATED ORAL at 21:36

## 2019-04-06 RX ADMIN — Medication 2: at 12:36

## 2019-04-06 NOTE — CONSULT NOTE ADULT - ASSESSMENT
48M ETOH abuse no medical follow up found with new onset diabetes most likely DM2 HBA1c 12.9%.    1. DM2  Given high HBA1c would dc on basal bolus insulin.  Encourage ETOH cessation as this can cause hypoglycemia while on insulin.  Insulin pen teaching.  Glucometer teaching.  RD consult  For now would start Lantus 11 units qhs  Humalog 3/3/3  Low Humalog correction scales  add hypoglycemia protocol    2. HLD - agree with adding statin  Would check baseline lipid profile    3. HTN - Lisinopril added. Goal < 130/80. 48M ETOH abuse no medical follow up found with new onset diabetes most likely DM2 HBA1c 12.9%.    1. DM2  Given high HBA1c would dc on basal bolus insulin.  Encourage ETOH cessation as this can cause hypoglycemia while on insulin.  Insulin pen teaching.  Glucometer teaching.  RD consult  For now would start Lantus 11 units qhs  Humalog 3/3/3  Low Humalog correction scales  add hypoglycemia protocol  Outpatient endocrine follow up [phone]    2. HLD - agree with adding statin  Would check baseline lipid profile    3. HTN - Lisinopril added. Goal < 130/80.

## 2019-04-06 NOTE — PROGRESS NOTE ADULT - PROBLEM SELECTOR PLAN 2
Patient w/ headaches over last several weeks along with other non-specific complaints. No focal deficits on physical exam. Low suspicion for stroke. 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   - complains of increased thirst and micturition, some facial fullness  - Urine/Serum tox screen  - Heavy metal screening  - B12/Folate/Thiamine levels Patient w/ headaches over last several weeks along with other non-specific complaints. No focal deficits on physical exam. Low suspicion for stroke. 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   - Urine/Serum tox screen neg  - Heavy metal screening pending  - B12/Folate/Thiamine levels wnl

## 2019-04-06 NOTE — PROGRESS NOTE ADULT - PROBLEM SELECTOR PLAN 4
Last drink yesterday evening; no hx of seizures, intubation, or admissions for etOH withdrawal  - Low risk CIWA - symptom triggered  - f/u vit b12/folate/thiamine  - c/w PO thiamine, folate, and MVA supplementation  - IVF hydration Last drink yesterday evening; no hx of seizures, intubation, or admissions for etOH withdrawal  - Low risk CIWA - symptom triggered  - c/w PO thiamine and MVA supplementation

## 2019-04-06 NOTE — PROGRESS NOTE ADULT - ASSESSMENT
47 yo M PMHx of HTN, HLD, T2DM p/w headache, blurry vision, found to have uncontrolled T2DM (a1c 12.9%) w/ FS 300s and r/o etOH withdrawal

## 2019-04-06 NOTE — CONSULT NOTE ADULT - SUBJECTIVE AND OBJECTIVE BOX
HPI:  47 y/o M w/ PMH HTN, HLD p/w headache, dizziness over last 3 weeks. Patient reports he has been having "dizziness in his head" over last 3 weeks. He states this feeling tends to occur when he walks. He reports a pain in the back of his head which migrates up to the top of his head. These symptoms have been occurring nearly every day over the last several weeks. He has also been having blurry vision which has been occurring intermittently. Denies room spinning sensation, tinnitus, or focal weakness. He reports some chest pain intermittently, not usually associated with exertion, accompanied by SOB. 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 Reports multi-day binge drinking episodes, about 1 pint of vodka and 1-2 6packs of beers 3-4x per month. He denies tobacco use. Lives with wife at home. Last drink yesterday evening, had 2 beers. Had an episode of vomiting a few days ago. Denies any diarrhea, nausea, dysuria.   Additionally, patient reports frequent micturition and excessive thirst over the past months to years; more pronounced at times.      Endocrine history:  48M no prior medical history. He does not go to doctor. He was found with new onset uncontrolled diabetes.  + polyuria + polydipsia  Diet "I eat everything." Diet includes rice and roti. Drinks alcohol frequently but wants to stop. Also reports drinking juices.  He denies family history of diabetes.    In the ED:  Vitals: afeb, /115, RR 18, spO2 99% on RA  Labs: WBC 7.6, PLAT 200, Cr 0.53, , AG 16, A1C 12.9%, Urine glucose >1000  Imaging: CXR clear  Given Librium 25mg x1 and 2L NS in ED (05 Apr 2019 18:41)      PAST MEDICAL & SURGICAL HISTORY:  High blood pressure: By pt report; does not use any medications as of today (3/7/18)  H/O nose injury: ~ puncture wound - s/p surgical repair (teenage years)      FAMILY HISTORY:  No pertinent family history in first degree relatives      Social History: + ETOH abuse    Outpatient Medications: None    MEDICATIONS  (STANDING):  atorvastatin 40 milliGRAM(s) Oral at bedtime  enoxaparin Injectable 40 milliGRAM(s) SubCutaneous every 24 hours  influenza   Vaccine 0.5 milliLiter(s) IntraMuscular once  insulin lispro (HumaLOG) corrective regimen sliding scale   SubCutaneous three times a day before meals  insulin lispro (HumaLOG) corrective regimen sliding scale   SubCutaneous at bedtime  lisinopril 5 milliGRAM(s) Oral daily  multivitamin 1 Tablet(s) Oral daily  sodium chloride 0.9% 1000 milliLiter(s) (100 mL/Hr) IV Continuous <Continuous>  thiamine 100 milliGRAM(s) Oral daily    MEDICATIONS  (PRN):  LORazepam     Tablet 2 milliGRAM(s) Oral every 2 hours PRN CIWA-Ar score increase by 2 points and a total score of 7 or less  LORazepam     Tablet 2 milliGRAM(s) Oral every 1 hour PRN CIWA-Ar score 8 or greater      Allergies    No Known Allergies    Intolerances      Review of Systems:  Constitutional: No fever  Eyes: No blurry vision  Neuro: No tremors  HEENT: No pain  Cardiovascular: No chest pain, palpitations  Respiratory: No SOB, no cough  GI: No nausea, vomiting, abdominal pain  : No dysuria  Skin: no rash  Psych: no depression  Endocrine: +polyuria, polydipsia  Hem/lymph: no swelling  Osteoporosis: no fractures    ALL OTHER SYSTEMS REVIEWED AND NEGATIVE      PHYSICAL EXAM:  VITALS: T(C): 36.6 (04-06-19 @ 13:30)  T(F): 97.8 (04-06-19 @ 13:30), Max: 98.5 (04-05-19 @ 19:44)  HR: 89 (04-06-19 @ 13:30) (79 - 97)  BP: 139/97 (04-06-19 @ 13:30) (100/75 - 157/111)  RR:  (16 - 18)  SpO2:  (99% - 100%)  Wt(kg): --  GENERAL: NAD, well-groomed, well-developed  EYES: No proptosis, no lid lag, anicteric  HEENT:  Atraumatic, Normocephalic, moist mucous membranes  THYROID: Normal size, no palpable nodules  RESPIRATORY: Clear to auscultation bilaterally; No rales, rhonchi, wheezing  CARDIOVASCULAR: Regular rate and rhythm; No murmurs; no peripheral edema  GI: Soft, nontender, non distended, normal bowel sounds  SKIN: Dry, intact, No rashes or lesions  MUSCULOSKELETAL: Full range of motion, normal strength  NEURO: sensation intact, extraocular movements intact, no tremor  PSYCH: Alert and oriented x 3, normal affect, normal mood  CUSHING'S SIGNS: no striae      CAPILLARY BLOOD GLUCOSE      POCT Blood Glucose.: 287 mg/dL (06 Apr 2019 17:11)  POCT Blood Glucose.: 228 mg/dL (06 Apr 2019 12:35)  POCT Blood Glucose.: <25 mg/dL (06 Apr 2019 12:34)  POCT Blood Glucose.: 262 mg/dL (06 Apr 2019 08:36)  POCT Blood Glucose.: 245 mg/dL (05 Apr 2019 21:47)  POCT Blood Glucose.: 248 mg/dL (05 Apr 2019 19:38)                            15.0   6.40  )-----------( 169      ( 06 Apr 2019 06:45 )             45.7       04-06    133<L>  |  98  |  12  ----------------------------<  262<H>  4.6   |  23  |  0.61    EGFR if : 135  EGFR if non : 116    Ca    8.1<L>      04-06  Mg     2.0     04-06  Phos  3.4     04-06    TPro  6.1  /  Alb  3.3  /  TBili  0.2  /  DBili  x   /  AST  19  /  ALT  23  /  AlkPhos  68  04-06      Thyroid Function Tests:      Hemoglobin A1C, Whole Blood: 12.9 % <H> [4.0 - 5.6] (04-05-19 @ 15:11)          Radiology:

## 2019-04-06 NOTE — PROGRESS NOTE ADULT - PROBLEM SELECTOR PLAN 5
Uncontrolled to 160s/110s; not on any meds  - Start lisinopril 5mg daily  - Up-titrate as needed Uncontrolled to 160s/110s; not on any meds at home  - c/w lisinopril 5mg daily  - Up-titrate as needed

## 2019-04-06 NOTE — PROGRESS NOTE ADULT - PROBLEM SELECTOR PLAN 1
A1C 12.9%, FS 300s w/ UA showing glucosuria >1000. AG 16  No history of DM in the past, although recent months to years with increased thirst, increased frequency of micturition, excessive tiredness at times, blurred vision  has not seen a physician in years  - ISS  - FS premeals/bedtime  - Endo consult  - will need referral to ophthalmology and podiatry upon discharge A1C 12.9%, FS 300s w/ UA showing glucosuria >1000. AG 16  No history of DM in the past, although recent months to years with increased thirst, increased frequency of micturition, excessive tiredness at times, blurred vision  has not seen a physician in years  - ISS  - FS premeals/bedtime  - Endo consult pending  - will need referral to ophthalmology and podiatry upon discharge

## 2019-04-06 NOTE — PROGRESS NOTE ADULT - SUBJECTIVE AND OBJECTIVE BOX
Reji Jimenez PGY-1   [facility] Pager # [phone]  NS Pager # [phone]      Patient is a 48y old  Male who presents with a chief complaint of Tremors of nervous system, Diabetes-Type II, Alcohol dependence (05 Apr 2019 18:41)      SUBJECTIVE: No acute events overnight. Patient seen and examined at bedside.    MEDICATIONS  (STANDING):  enoxaparin Injectable 40 milliGRAM(s) SubCutaneous every 24 hours  influenza   Vaccine 0.5 milliLiter(s) IntraMuscular once  insulin lispro (HumaLOG) corrective regimen sliding scale   SubCutaneous three times a day before meals  insulin lispro (HumaLOG) corrective regimen sliding scale   SubCutaneous at bedtime  lisinopril 5 milliGRAM(s) Oral daily  sodium chloride 0.9% 1000 milliLiter(s) (100 mL/Hr) IV Continuous <Continuous>    MEDICATIONS  (PRN):  LORazepam     Tablet 2 milliGRAM(s) Oral every 2 hours PRN CIWA-Ar score increase by 2 points and a total score of 7 or less  LORazepam     Tablet 2 milliGRAM(s) Oral every 1 hour PRN CIWA-Ar score 8 or greater        Objective:    Vitals: Vital Signs Last 24 Hrs  T(C): 36.7 (04-06-19 @ 05:31), Max: 37 (04-05-19 @ 13:57)  T(F): 98.1 (04-06-19 @ 05:31), Max: 98.6 (04-05-19 @ 13:57)  HR: 79 (04-06-19 @ 05:31) (79 - 103)  BP: 100/75 (04-06-19 @ 05:31) (100/75 - 179/113)  BP(mean): --  RR: 18 (04-06-19 @ 05:31) (16 - 18)  SpO2: 100% (04-06-19 @ 05:31) (99% - 100%)            I&O's Summary      PHYSICAL EXAM:  GENERAL: NAD  HEAD:  Atraumatic, Normocephalic,   ENT:  No tonsillar erythema, exudates; Moist mucous membranes, nose disproportionately large compared w/ other facial features  CHEST/LUNG: Clear to auscultation bilaterally; No rales or wheezing  HEART: Regular rate and rhythm; No murmurs, rubs, or gallops  ABDOMEN: Soft, nontender, nondistended  EXTREMITIES:  No clubbing, cyanosis, or edema  LYMPH: No lymphadenopathy noted  SKIN: No rashes or lesions  NERVOUS SYSTEM:  Alert & Oriented X3    LABS:  04-05    130<L>  |  88<L>  |  9   ----------------------------<  323<H>  4.2   |  26  |  0.53    Ca    9.6      05 Apr 2019 15:11  Mg     2.1     04-05    TPro  7.9  /  Alb  4.5  /  TBili  0.6  /  DBili  x   /  AST  23  /  ALT  28  /  AlkPhos  103  04-05    PT/INR - ( 06 Apr 2019 06:40 )   PT: 10.4 SEC;   INR: 0.94          PTT - ( 06 Apr 2019 06:40 )  PTT:30.9 SEC              Urinalysis Basic - ( 05 Apr 2019 16:13 )    Color: LIGHT YELLOW / Appearance: CLEAR / SG: > 1.040 / pH: 7.0  Gluc: >1000 / Ketone: SMALL  / Bili: NEGATIVE / Urobili: NORMAL   Blood: NEGATIVE / Protein: 20 / Nitrite: NEGATIVE   Leuk Esterase: NEGATIVE / RBC: 0-2 / WBC 0-2   Sq Epi: OCC / Non Sq Epi: x / Bacteria: NEGATIVE                              17.0   7.61  )-----------( 200      ( 05 Apr 2019 15:11 )             49.5     CAPILLARY BLOOD GLUCOSE      POCT Blood Glucose.: 245 mg/dL (05 Apr 2019 21:47)  POCT Blood Glucose.: 248 mg/dL (05 Apr 2019 19:38)  POCT Blood Glucose.: 364 mg/dL (05 Apr 2019 14:04)    RADIOLOGY:  Imaging Personally Reviewed: YES      Consultants involved in case: YES  Consultant(s) Notes Reviewed:  YES  Care Discussed with Consultants/Other Providers       Reji Jimenez, PGY-1

## 2019-04-06 NOTE — PROGRESS NOTE ADULT - ATTENDING COMMENTS
Patient seen and examined.  Agree with note of Dr Jimenez.  patient needs Diabetic teaching and use of insulin teaching.  He needs out patient appointment with Endocrine once Diabetic control optimized.

## 2019-04-07 LAB
ANION GAP SERPL CALC-SCNC: 13 MMO/L — SIGNIFICANT CHANGE UP (ref 7–14)
BACTERIA UR CULT: SIGNIFICANT CHANGE UP
BUN SERPL-MCNC: 12 MG/DL — SIGNIFICANT CHANGE UP (ref 7–23)
CALCIUM SERPL-MCNC: 9.2 MG/DL — SIGNIFICANT CHANGE UP (ref 8.4–10.5)
CHLORIDE SERPL-SCNC: 98 MMOL/L — SIGNIFICANT CHANGE UP (ref 98–107)
CHOLEST SERPL-MCNC: 295 MG/DL — HIGH (ref 120–199)
CO2 SERPL-SCNC: 22 MMOL/L — SIGNIFICANT CHANGE UP (ref 22–31)
CREAT SERPL-MCNC: 0.64 MG/DL — SIGNIFICANT CHANGE UP (ref 0.5–1.3)
ETHANOL BLD-MCNC: < 10 MG/DL — SIGNIFICANT CHANGE UP
GLUCOSE SERPL-MCNC: 251 MG/DL — HIGH (ref 70–99)
HDLC SERPL-MCNC: 29 MG/DL — LOW (ref 35–55)
LIPID PNL WITH DIRECT LDL SERPL: 159 MG/DL — SIGNIFICANT CHANGE UP
MAGNESIUM SERPL-MCNC: 2.1 MG/DL — SIGNIFICANT CHANGE UP (ref 1.6–2.6)
PHOSPHATE SERPL-MCNC: 4.1 MG/DL — SIGNIFICANT CHANGE UP (ref 2.5–4.5)
POTASSIUM SERPL-MCNC: 4.6 MMOL/L — SIGNIFICANT CHANGE UP (ref 3.5–5.3)
POTASSIUM SERPL-SCNC: 4.6 MMOL/L — SIGNIFICANT CHANGE UP (ref 3.5–5.3)
SODIUM SERPL-SCNC: 133 MMOL/L — LOW (ref 135–145)
SPECIMEN SOURCE: SIGNIFICANT CHANGE UP
TRIGL SERPL-MCNC: 639 MG/DL — HIGH (ref 10–149)

## 2019-04-07 PROCEDURE — 99232 SBSQ HOSP IP/OBS MODERATE 35: CPT

## 2019-04-07 RX ORDER — INSULIN LISPRO 100/ML
5 VIAL (ML) SUBCUTANEOUS
Qty: 0 | Refills: 0 | Status: DISCONTINUED | OUTPATIENT
Start: 2019-04-07 | End: 2019-04-08

## 2019-04-07 RX ORDER — INSULIN LISPRO 100/ML
3 VIAL (ML) SUBCUTANEOUS
Qty: 0 | Refills: 0 | Status: DISCONTINUED | OUTPATIENT
Start: 2019-04-07 | End: 2019-04-07

## 2019-04-07 RX ORDER — ACETAMINOPHEN 500 MG
325 TABLET ORAL EVERY 4 HOURS
Qty: 0 | Refills: 0 | Status: DISCONTINUED | OUTPATIENT
Start: 2019-04-07 | End: 2019-04-08

## 2019-04-07 RX ADMIN — Medication 4: at 17:47

## 2019-04-07 RX ADMIN — Medication 325 MILLIGRAM(S): at 18:17

## 2019-04-07 RX ADMIN — Medication 100 MILLIGRAM(S): at 12:04

## 2019-04-07 RX ADMIN — Medication 1 TABLET(S): at 12:03

## 2019-04-07 RX ADMIN — Medication 4: at 12:04

## 2019-04-07 RX ADMIN — ATORVASTATIN CALCIUM 40 MILLIGRAM(S): 80 TABLET, FILM COATED ORAL at 21:26

## 2019-04-07 RX ADMIN — Medication 1: at 21:26

## 2019-04-07 RX ADMIN — Medication 3 UNIT(S): at 12:04

## 2019-04-07 RX ADMIN — Medication 3 UNIT(S): at 08:15

## 2019-04-07 RX ADMIN — LISINOPRIL 5 MILLIGRAM(S): 2.5 TABLET ORAL at 05:18

## 2019-04-07 RX ADMIN — ENOXAPARIN SODIUM 40 MILLIGRAM(S): 100 INJECTION SUBCUTANEOUS at 05:18

## 2019-04-07 RX ADMIN — Medication 4: at 08:16

## 2019-04-07 RX ADMIN — Medication 5 UNIT(S): at 17:46

## 2019-04-07 RX ADMIN — Medication 325 MILLIGRAM(S): at 17:47

## 2019-04-07 RX ADMIN — INSULIN GLARGINE 11 UNIT(S): 100 INJECTION, SOLUTION SUBCUTANEOUS at 21:26

## 2019-04-07 NOTE — PROGRESS NOTE ADULT - ASSESSMENT
48M ETOH abuse no medical follow up found with new onset diabetes most likely DM2 HBA1c 12.9%.    1. DM2  Given high HBA1c would dc on basal bolus insulin.  Encourage ETOH cessation as this can cause hypoglycemia while on insulin.  Insulin pen teaching.  Glucometer teaching.  RD consult  For now would start Lantus 11 units qhs  Humalog 3/3/3  Low Humalog correction scales  add hypoglycemia protocol  Outpatient endocrine follow up [phone]    2. HLD - agree with adding statin  Would check baseline lipid profile    3. HTN - Lisinopril added. Goal < 130/80. 48M ETOH abuse no medical follow up found with new onset diabetes most likely DM2 HBA1c 12.9%.    1. DM2  Given high HBA1c would dc on basal bolus insulin.  Encourage ETOH cessation as this can cause hypoglycemia while on insulin.  Insulin pen teaching.  Glucometer teaching.  Would check 3 am FS.  if low, may consider moving Lantus to AM  For now would c/w Lantus 11 units qhs  increase Humalog 5/5/5  Low Humalog correction scales  add hypoglycemia protocol  Outpatient endocrine follow up [phone]

## 2019-04-07 NOTE — PROGRESS NOTE ADULT - ASSESSMENT
47 yo M PMHx of HTN, HLD p/w headache, blurry vision, found to have new diagnosis of uncontrolled DM2, also with concern for EtOH withdrawal.

## 2019-04-07 NOTE — PROGRESS NOTE ADULT - SUBJECTIVE AND OBJECTIVE BOX
Chief Complaint: t2dm    History:    MEDICATIONS  (STANDING):  atorvastatin 40 milliGRAM(s) Oral at bedtime  dextrose 5%. 1000 milliLiter(s) (50 mL/Hr) IV Continuous <Continuous>  dextrose 50% Injectable 12.5 Gram(s) IV Push once  dextrose 50% Injectable 25 Gram(s) IV Push once  dextrose 50% Injectable 25 Gram(s) IV Push once  enoxaparin Injectable 40 milliGRAM(s) SubCutaneous every 24 hours  influenza   Vaccine 0.5 milliLiter(s) IntraMuscular once  insulin glargine Injectable (LANTUS) 11 Unit(s) SubCutaneous at bedtime  insulin lispro (HumaLOG) corrective regimen sliding scale   SubCutaneous three times a day before meals  insulin lispro (HumaLOG) corrective regimen sliding scale   SubCutaneous at bedtime  insulin lispro Injectable (HumaLOG) 3 Unit(s) SubCutaneous three times a day before meals  lisinopril 5 milliGRAM(s) Oral daily  multivitamin 1 Tablet(s) Oral daily  sodium chloride 0.9% 1000 milliLiter(s) (100 mL/Hr) IV Continuous <Continuous>  thiamine 100 milliGRAM(s) Oral daily    MEDICATIONS  (PRN):  dextrose 40% Gel 15 Gram(s) Oral once PRN Blood Glucose LESS THAN 70 milliGRAM(s)/deciLiter  glucagon  Injectable 1 milliGRAM(s) IntraMuscular once PRN Glucose <70 milliGRAM(s)/deciLiter  LORazepam     Tablet 2 milliGRAM(s) Oral every 2 hours PRN CIWA-Ar score increase by 2 points and a total score of 7 or less  LORazepam     Tablet 2 milliGRAM(s) Oral every 1 hour PRN CIWA-Ar score 8 or greater      Allergies    No Known Allergies    Intolerances      Review of Systems:  Constitutional: No fever  Eyes: No blurry vision  Neuro: No tremors  HEENT: No pain  Cardiovascular: No chest pain, palpitations  Respiratory: No SOB, no cough  GI: No nausea, vomiting, abdominal pain  : No dysuria  Skin: no rash  Psych: no depression  Endocrine: no polyuria, polydipsia  Hem/lymph: no swelling  Osteoporosis: no fractures    ALL OTHER SYSTEMS REVIEWED AND NEGATIVE    UNABLE TO OBTAIN    PHYSICAL EXAM:  VITALS: T(C): 37 (04-07-19 @ 09:36)  T(F): 98.6 (04-07-19 @ 09:36), Max: 98.6 (04-07-19 @ 09:36)  HR: 96 (04-07-19 @ 09:36) (73 - 96)  BP: 140/90 (04-07-19 @ 09:36) (102/74 - 140/90)  RR:  (17 - 18)  SpO2:  (100% - 100%)  Wt(kg): --  GENERAL: NAD, well-groomed, well-developed  EYES: No proptosis, no lid lag, anicteric  HEENT:  Atraumatic, Normocephalic, moist mucous membranes  THYROID: Normal size, no palpable nodules  RESPIRATORY: Clear to auscultation bilaterally; No rales, rhonchi, wheezing  CARDIOVASCULAR: Regular rate and rhythm; No murmurs; no peripheral edema  GI: Soft, nontender, non distended  SKIN: Dry, intact, No rashes or lesions  MUSCULOSKELETAL: Full range of motion, normal strength  NEURO: extraocular movements intact, no tremor  PSYCH: Alert and oriented x 3, normal affect, normal mood      CAPILLARY BLOOD GLUCOSE      POCT Blood Glucose.: 335 mg/dL (07 Apr 2019 11:55)  POCT Blood Glucose.: 339 mg/dL (07 Apr 2019 08:12)  POCT Blood Glucose.: 95 mg/dL (07 Apr 2019 05:29)  POCT Blood Glucose.: 198 mg/dL (06 Apr 2019 21:33)  POCT Blood Glucose.: 287 mg/dL (06 Apr 2019 17:11)  POCT Blood Glucose.: 228 mg/dL (06 Apr 2019 12:35)  POCT Blood Glucose.: <25 mg/dL (06 Apr 2019 12:34)      04-07    133<L>  |  98  |  12  ----------------------------<  251<H>  4.6   |  22  |  0.64    EGFR if : 132  EGFR if non : 114    Ca    9.2      04-07  Mg     2.1     04-07  Phos  4.1     04-07    TPro  6.1  /  Alb  3.3  /  TBili  0.2  /  DBili  x   /  AST  19  /  ALT  23  /  AlkPhos  68  04-06          Hemoglobin A1C, Whole Blood: 12.9 % <H> [4.0 - 5.6] (04-05-19 @ 15:11) Chief Complaint: t2dm    History:  denies n/v, tolerates po, no hypoglycemia.  reports learning insulin pens and glucometer    MEDICATIONS  (STANDING):  atorvastatin 40 milliGRAM(s) Oral at bedtime  dextrose 5%. 1000 milliLiter(s) (50 mL/Hr) IV Continuous <Continuous>  dextrose 50% Injectable 12.5 Gram(s) IV Push once  dextrose 50% Injectable 25 Gram(s) IV Push once  dextrose 50% Injectable 25 Gram(s) IV Push once  enoxaparin Injectable 40 milliGRAM(s) SubCutaneous every 24 hours  influenza   Vaccine 0.5 milliLiter(s) IntraMuscular once  insulin glargine Injectable (LANTUS) 11 Unit(s) SubCutaneous at bedtime  insulin lispro (HumaLOG) corrective regimen sliding scale   SubCutaneous three times a day before meals  insulin lispro (HumaLOG) corrective regimen sliding scale   SubCutaneous at bedtime  insulin lispro Injectable (HumaLOG) 3 Unit(s) SubCutaneous three times a day before meals  lisinopril 5 milliGRAM(s) Oral daily  multivitamin 1 Tablet(s) Oral daily  sodium chloride 0.9% 1000 milliLiter(s) (100 mL/Hr) IV Continuous <Continuous>  thiamine 100 milliGRAM(s) Oral daily    MEDICATIONS  (PRN):  dextrose 40% Gel 15 Gram(s) Oral once PRN Blood Glucose LESS THAN 70 milliGRAM(s)/deciLiter  glucagon  Injectable 1 milliGRAM(s) IntraMuscular once PRN Glucose <70 milliGRAM(s)/deciLiter  LORazepam     Tablet 2 milliGRAM(s) Oral every 2 hours PRN CIWA-Ar score increase by 2 points and a total score of 7 or less  LORazepam     Tablet 2 milliGRAM(s) Oral every 1 hour PRN CIWA-Ar score 8 or greater      Allergies    No Known Allergies    Intolerances      Review of Systems:  Constitutional: No fever  Eyes: No blurry vision      ALL OTHER SYSTEMS REVIEWED AND NEGATIVE        PHYSICAL EXAM:  VITALS: T(C): 37 (04-07-19 @ 09:36)  T(F): 98.6 (04-07-19 @ 09:36), Max: 98.6 (04-07-19 @ 09:36)  HR: 96 (04-07-19 @ 09:36) (73 - 96)  BP: 140/90 (04-07-19 @ 09:36) (102/74 - 140/90)  RR:  (17 - 18)  SpO2:  (100% - 100%)  Wt(kg): --  GENERAL: NAD, well-groomed, well-developed  GI: Soft, nontender, non distended  SKIN: Dry, intact, No rashes or lesions  PSYCH: Alert and oriented x 3, normal affect, normal mood      CAPILLARY BLOOD GLUCOSE      POCT Blood Glucose.: 335 mg/dL (07 Apr 2019 11:55)  POCT Blood Glucose.: 339 mg/dL (07 Apr 2019 08:12)  POCT Blood Glucose.: 95 mg/dL (07 Apr 2019 05:29)  POCT Blood Glucose.: 198 mg/dL (06 Apr 2019 21:33)  POCT Blood Glucose.: 287 mg/dL (06 Apr 2019 17:11)  POCT Blood Glucose.: 228 mg/dL (06 Apr 2019 12:35)  POCT Blood Glucose.: <25 mg/dL (06 Apr 2019 12:34)      04-07    133<L>  |  98  |  12  ----------------------------<  251<H>  4.6   |  22  |  0.64    EGFR if : 132  EGFR if non : 114    Ca    9.2      04-07  Mg     2.1     04-07  Phos  4.1     04-07    TPro  6.1  /  Alb  3.3  /  TBili  0.2  /  DBili  x   /  AST  19  /  ALT  23  /  AlkPhos  68  04-06          Hemoglobin A1C, Whole Blood: 12.9 % <H> [4.0 - 5.6] (04-05-19 @ 15:11)

## 2019-04-07 NOTE — DIETITIAN INITIAL EVALUATION ADULT. - PROBLEM SELECTOR PLAN 4
Last drink yesterday evening; no hx of seizures, intubation, or admissions for etOH withdrawal  - Low risk CIWA - symptom triggered  - f/u AM lab-work - including vit b12/folate/thiamine  - Start PO thiamine, folate, and MVA supplementation  - IVF hydration  - Continue to encourage alcohol cessation

## 2019-04-07 NOTE — PROGRESS NOTE ADULT - PROBLEM SELECTOR PLAN 5
Uncontrolled to 160s/110s; not on any meds at home  - c/w lisinopril 5mg daily  - Up-titrate as needed

## 2019-04-07 NOTE — DIETITIAN INITIAL EVALUATION ADULT. - PROBLEM SELECTOR PLAN 3
Has been having R sided chest pain radiating to neck intermittently, not usually associated with exertion. EKG showing LVH and j point elevation in V2-V3  - hsTrop pending  - TTE pending

## 2019-04-07 NOTE — DIETITIAN INITIAL EVALUATION ADULT. - OTHER INFO
Nutrition consult ordered for uncontrolled  DM, 47 Y/O male admitted with the DX of tremor, ETOH abuse, hyperglycemia, pt reported to  have good po and appetite with no N/V/D reported at   this time,  lost ~2 pounds  in 1 month, BMI remains 23. LABS reviewed, elevated HgbA1C 12.9% and HLD noted .  Recommend to add DASH/TLC to current diet. pt had no prior knowledge about  therapeutic diet. Detailed nutrition education provided with sample menu. Alcohol cessation encouraged. Pt will benefit from regular f/u in out pt clinic.  RDN remains available, pt made aware.

## 2019-04-07 NOTE — PROGRESS NOTE ADULT - ATTENDING COMMENTS
Patient seen and examined with team  Agree with above note of Dr Agrawal  Agree with above A/P  Patient need diabetic teaching and supplies and out patient f/u with Endocrine.  Pos d/c 4/8 Patient seen and examined with team  Agree with above note of Dr Agrawal  Agree with above A/P  Patient need diabetic teaching and supplies and out patient f/u with Endocrine.  Patient councelled about keeping up with FS and Endocrine appointments   Pos d/c 4/8

## 2019-04-07 NOTE — PROGRESS NOTE ADULT - SUBJECTIVE AND OBJECTIVE BOX
Ivy Agrawal MD  PGY-3 | Internal Medicine  [phone] / [phone]    Patient is a 48y old  Male who presents with a chief complaint of Tremors of nervous system, Diabetes-Type II, Alcohol dependence (06 Apr 2019 17:40)      SUBJECTIVE / OVERNIGHT EVENTS:    MEDICATIONS  (STANDING):  atorvastatin 40 milliGRAM(s) Oral at bedtime  dextrose 5%. 1000 milliLiter(s) (50 mL/Hr) IV Continuous <Continuous>  dextrose 50% Injectable 12.5 Gram(s) IV Push once  dextrose 50% Injectable 25 Gram(s) IV Push once  dextrose 50% Injectable 25 Gram(s) IV Push once  enoxaparin Injectable 40 milliGRAM(s) SubCutaneous every 24 hours  influenza   Vaccine 0.5 milliLiter(s) IntraMuscular once  insulin glargine Injectable (LANTUS) 11 Unit(s) SubCutaneous at bedtime  insulin lispro (HumaLOG) corrective regimen sliding scale   SubCutaneous three times a day before meals  insulin lispro (HumaLOG) corrective regimen sliding scale   SubCutaneous at bedtime  insulin lispro Injectable (HumaLOG) 3 Unit(s) SubCutaneous three times a day before meals  lisinopril 5 milliGRAM(s) Oral daily  multivitamin 1 Tablet(s) Oral daily  sodium chloride 0.9% 1000 milliLiter(s) (100 mL/Hr) IV Continuous <Continuous>  thiamine 100 milliGRAM(s) Oral daily    MEDICATIONS  (PRN):  dextrose 40% Gel 15 Gram(s) Oral once PRN Blood Glucose LESS THAN 70 milliGRAM(s)/deciLiter  glucagon  Injectable 1 milliGRAM(s) IntraMuscular once PRN Glucose <70 milliGRAM(s)/deciLiter  LORazepam     Tablet 2 milliGRAM(s) Oral every 2 hours PRN CIWA-Ar score increase by 2 points and a total score of 7 or less  LORazepam     Tablet 2 milliGRAM(s) Oral every 1 hour PRN CIWA-Ar score 8 or greater      T(C): 36.5 (04-07-19 @ 05:06), Max: 36.8 (04-06-19 @ 21:26)  HR: 92 (04-07-19 @ 05:06) (73 - 95)  BP: 102/74 (04-07-19 @ 05:06) (102/74 - 139/97)  RR: 17 (04-07-19 @ 05:06) (17 - 18)  SpO2: 100% (04-07-19 @ 05:06) (100% - 100%)    CAPILLARY BLOOD GLUCOSE      POCT Blood Glucose.: 339 mg/dL (07 Apr 2019 08:12)  POCT Blood Glucose.: 95 mg/dL (07 Apr 2019 05:29)  POCT Blood Glucose.: 198 mg/dL (06 Apr 2019 21:33)  POCT Blood Glucose.: 287 mg/dL (06 Apr 2019 17:11)  POCT Blood Glucose.: 228 mg/dL (06 Apr 2019 12:35)  POCT Blood Glucose.: <25 mg/dL (06 Apr 2019 12:34)  POCT Blood Glucose.: 262 mg/dL (06 Apr 2019 08:36)    I&O's Summary      PHYSICAL EXAM:  GENERAL:  HEAD:  EYES:  NECK:  CHEST/LUNG:  HEART:  ABDOMEN:  EXTREMITIES:  PSYCH:  NEUROLOGY:  SKIN:    LABS:                        15.0   6.40  )-----------( 169      ( 06 Apr 2019 06:45 )             45.7     04-07    133<L>  |  98  |  12  ----------------------------<  251<H>  4.6   |  22  |  0.64    Ca    9.2      07 Apr 2019 05:30  Phos  4.1     04-07  Mg     2.1     04-07    TPro  6.1  /  Alb  3.3  /  TBili  0.2  /  DBili  x   /  AST  19  /  ALT  23  /  AlkPhos  68  04-06    PT/INR - ( 06 Apr 2019 06:40 )   PT: 10.4 SEC;   INR: 0.94          PTT - ( 06 Apr 2019 06:40 )  PTT:30.9 SEC      Urinalysis Basic - ( 05 Apr 2019 16:13 )    Color: LIGHT YELLOW / Appearance: CLEAR / SG: > 1.040 / pH: 7.0  Gluc: >1000 / Ketone: SMALL  / Bili: NEGATIVE / Urobili: NORMAL   Blood: NEGATIVE / Protein: 20 / Nitrite: NEGATIVE   Leuk Esterase: NEGATIVE / RBC: 0-2 / WBC 0-2   Sq Epi: OCC / Non Sq Epi: x / Bacteria: NEGATIVE        RADIOLOGY & ADDITIONAL TESTS:    Imaging Personally Reviewed:     Consultant(s) Notes Reviewed:     Care Discussed with Consultants/Other Providers: Ivy Agrawal MD  PGY-3 | Internal Medicine  [phone] / [phone]    Patient is a 48y old  Male who presents with a chief complaint of Tremors of nervous system, Diabetes-Type II, Alcohol dependence (06 Apr 2019 17:40)      SUBJECTIVE / OVERNIGHT EVENTS: pt reports feeling well, has started insulin teaching with RN, awaits discharge    MEDICATIONS  (STANDING):  atorvastatin 40 milliGRAM(s) Oral at bedtime  dextrose 5%. 1000 milliLiter(s) (50 mL/Hr) IV Continuous <Continuous>  dextrose 50% Injectable 12.5 Gram(s) IV Push once  dextrose 50% Injectable 25 Gram(s) IV Push once  dextrose 50% Injectable 25 Gram(s) IV Push once  enoxaparin Injectable 40 milliGRAM(s) SubCutaneous every 24 hours  influenza   Vaccine 0.5 milliLiter(s) IntraMuscular once  insulin glargine Injectable (LANTUS) 11 Unit(s) SubCutaneous at bedtime  insulin lispro (HumaLOG) corrective regimen sliding scale   SubCutaneous three times a day before meals  insulin lispro (HumaLOG) corrective regimen sliding scale   SubCutaneous at bedtime  insulin lispro Injectable (HumaLOG) 3 Unit(s) SubCutaneous three times a day before meals  lisinopril 5 milliGRAM(s) Oral daily  multivitamin 1 Tablet(s) Oral daily  sodium chloride 0.9% 1000 milliLiter(s) (100 mL/Hr) IV Continuous <Continuous>  thiamine 100 milliGRAM(s) Oral daily    MEDICATIONS  (PRN):  dextrose 40% Gel 15 Gram(s) Oral once PRN Blood Glucose LESS THAN 70 milliGRAM(s)/deciLiter  glucagon  Injectable 1 milliGRAM(s) IntraMuscular once PRN Glucose <70 milliGRAM(s)/deciLiter  LORazepam     Tablet 2 milliGRAM(s) Oral every 2 hours PRN CIWA-Ar score increase by 2 points and a total score of 7 or less  LORazepam     Tablet 2 milliGRAM(s) Oral every 1 hour PRN CIWA-Ar score 8 or greater      T(C): 36.5 (04-07-19 @ 05:06), Max: 36.8 (04-06-19 @ 21:26)  HR: 92 (04-07-19 @ 05:06) (73 - 95)  BP: 102/74 (04-07-19 @ 05:06) (102/74 - 139/97)  RR: 17 (04-07-19 @ 05:06) (17 - 18)  SpO2: 100% (04-07-19 @ 05:06) (100% - 100%)    CAPILLARY BLOOD GLUCOSE      POCT Blood Glucose.: 339 mg/dL (07 Apr 2019 08:12)  POCT Blood Glucose.: 95 mg/dL (07 Apr 2019 05:29)  POCT Blood Glucose.: 198 mg/dL (06 Apr 2019 21:33)  POCT Blood Glucose.: 287 mg/dL (06 Apr 2019 17:11)  POCT Blood Glucose.: 228 mg/dL (06 Apr 2019 12:35)  POCT Blood Glucose.: <25 mg/dL (06 Apr 2019 12:34)  POCT Blood Glucose.: 262 mg/dL (06 Apr 2019 08:36)    I&O's Summary      PHYSICAL EXAM:  T(C): 37 (04-07-19 @ 09:36), Max: 37 (04-07-19 @ 09:36)  HR: 96 (04-07-19 @ 09:36) (73 - 96)  BP: 140/90 (04-07-19 @ 09:36) (102/74 - 140/90)  RR: 18 (04-07-19 @ 09:36) (17 - 18)  SpO2: 100% (04-07-19 @ 09:36) (100% - 100%)    Gen: awake, alert  HENT: neck soft / supple; MMM  Lymph: no LAD noted in neck  Eye: sclerae anicteric  CV: normal rate, regular rhythm  Pulm: CTAB, no w/r/r auscultated  Abd: +BS, soft, NT, ND  Skin: warm, dry  Ext: no LE edema  Neuro: answering questions appropriately, following commands appropriately, recent and remote memory intact  Psych: normal mood / affect    LABS:                        15.0   6.40  )-----------( 169      ( 06 Apr 2019 06:45 )             45.7     04-07    133<L>  |  98  |  12  ----------------------------<  251<H>  4.6   |  22  |  0.64    Ca    9.2      07 Apr 2019 05:30  Phos  4.1     04-07  Mg     2.1     04-07    TPro  6.1  /  Alb  3.3  /  TBili  0.2  /  DBili  x   /  AST  19  /  ALT  23  /  AlkPhos  68  04-06    PT/INR - ( 06 Apr 2019 06:40 )   PT: 10.4 SEC;   INR: 0.94          PTT - ( 06 Apr 2019 06:40 )  PTT:30.9 SEC      Urinalysis Basic - ( 05 Apr 2019 16:13 )    Color: LIGHT YELLOW / Appearance: CLEAR / SG: > 1.040 / pH: 7.0  Gluc: >1000 / Ketone: SMALL  / Bili: NEGATIVE / Urobili: NORMAL   Blood: NEGATIVE / Protein: 20 / Nitrite: NEGATIVE   Leuk Esterase: NEGATIVE / RBC: 0-2 / WBC 0-2   Sq Epi: OCC / Non Sq Epi: x / Bacteria: NEGATIVE        RADIOLOGY & ADDITIONAL TESTS:    Imaging Personally Reviewed:     Consultant(s) Notes Reviewed:     Care Discussed with Consultants/Other Providers:

## 2019-04-07 NOTE — PROGRESS NOTE ADULT - PROBLEM SELECTOR PLAN 2
Patient w/ headaches over last several weeks along with other non-specific complaints. No focal deficits on physical exam. Low suspicion for stroke. 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   - Urine/Serum tox screen neg  - Heavy metal screening pending  - B12/Folate/Thiamine levels wnl

## 2019-04-07 NOTE — DIETITIAN INITIAL EVALUATION ADULT. - PROBLEM SELECTOR PLAN 2
Patient w/ headaches over last several weeks along with other non-specific complaints. No focal deficits on physical exam. Low suspicion for stroke. 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   - complains of increased thirst and micturition, some facial fullness  - Urine/Serum tox screen  - Heavy metal screening  - B12/Folate/Thiamine levels  - Ensure optimal hydration

## 2019-04-07 NOTE — DIETITIAN INITIAL EVALUATION ADULT. - PROBLEM SELECTOR PLAN 1
Type-II diabetes mellitus  Hgb-A1c = 12.9%, FS 300s w/ UA showing glucosuria >1000. AG 16,   No history of DM in the past, although recent months to years with increased thirst, increased frequency of micturition, excessive tiredness at times, blurred vision  has not seen a physician in years  will obtain urine ketones and serum BHB. Low suspicion for DKA given normal serum bicarb and pH.   - ISS  - FS premeals (consistent carb) /bedtime  - Endo consult  - f/u urine ketones and BHB  - will need referral to ophthalmology and podiatry upon discharge

## 2019-04-07 NOTE — PROGRESS NOTE ADULT - PROBLEM SELECTOR PLAN 4
Last drink yesterday evening; no hx of seizures, intubation, or admissions for etOH withdrawal  - Low risk CIWA - symptom triggered  - c/w PO thiamine and MVA supplementation

## 2019-04-08 ENCOUNTER — TRANSCRIPTION ENCOUNTER (OUTPATIENT)
Age: 49
End: 2019-04-08

## 2019-04-08 VITALS
SYSTOLIC BLOOD PRESSURE: 119 MMHG | OXYGEN SATURATION: 99 % | DIASTOLIC BLOOD PRESSURE: 86 MMHG | HEART RATE: 98 BPM | RESPIRATION RATE: 18 BRPM | TEMPERATURE: 98 F

## 2019-04-08 DIAGNOSIS — E11.9 TYPE 2 DIABETES MELLITUS WITHOUT COMPLICATIONS: ICD-10-CM

## 2019-04-08 PROBLEM — Z00.00 ENCOUNTER FOR PREVENTIVE HEALTH EXAMINATION: Status: ACTIVE | Noted: 2019-04-08

## 2019-04-08 LAB
ANION GAP SERPL CALC-SCNC: 11 MMO/L — SIGNIFICANT CHANGE UP (ref 7–14)
BUN SERPL-MCNC: 14 MG/DL — SIGNIFICANT CHANGE UP (ref 7–23)
CALCIUM SERPL-MCNC: 8.7 MG/DL — SIGNIFICANT CHANGE UP (ref 8.4–10.5)
CHLORIDE SERPL-SCNC: 98 MMOL/L — SIGNIFICANT CHANGE UP (ref 98–107)
CO2 SERPL-SCNC: 24 MMOL/L — SIGNIFICANT CHANGE UP (ref 22–31)
CREAT SERPL-MCNC: 0.61 MG/DL — SIGNIFICANT CHANGE UP (ref 0.5–1.3)
GLUCOSE SERPL-MCNC: 245 MG/DL — HIGH (ref 70–99)
MAGNESIUM SERPL-MCNC: 2.1 MG/DL — SIGNIFICANT CHANGE UP (ref 1.6–2.6)
PHOSPHATE SERPL-MCNC: 4.1 MG/DL — SIGNIFICANT CHANGE UP (ref 2.5–4.5)
POTASSIUM SERPL-MCNC: 3.8 MMOL/L — SIGNIFICANT CHANGE UP (ref 3.5–5.3)
POTASSIUM SERPL-SCNC: 3.8 MMOL/L — SIGNIFICANT CHANGE UP (ref 3.5–5.3)
SODIUM SERPL-SCNC: 133 MMOL/L — LOW (ref 135–145)

## 2019-04-08 PROCEDURE — 99239 HOSP IP/OBS DSCHRG MGMT >30: CPT | Mod: GC

## 2019-04-08 PROCEDURE — 93306 TTE W/DOPPLER COMPLETE: CPT | Mod: 26

## 2019-04-08 RX ORDER — INSULIN NPH HUM/REG INSULIN HM 70-30/ML
20 VIAL (ML) SUBCUTANEOUS
Qty: 9 | Refills: 0
Start: 2019-04-08 | End: 2019-05-07

## 2019-04-08 RX ORDER — INSULIN LISPRO 100/ML
5 VIAL (ML) SUBCUTANEOUS
Qty: 5 | Refills: 0 | OUTPATIENT
Start: 2019-04-08 | End: 2019-05-07

## 2019-04-08 RX ORDER — THIAMINE MONONITRATE (VIT B1) 100 MG
100 TABLET ORAL ONCE
Qty: 0 | Refills: 0 | Status: DISCONTINUED | OUTPATIENT
Start: 2019-04-08 | End: 2019-04-08

## 2019-04-08 RX ORDER — INSULIN GLARGINE 100 [IU]/ML
13 INJECTION, SOLUTION SUBCUTANEOUS
Qty: 4 | Refills: 0 | OUTPATIENT
Start: 2019-04-08 | End: 2019-05-07

## 2019-04-08 RX ORDER — ENOXAPARIN SODIUM 100 MG/ML
11 INJECTION SUBCUTANEOUS
Qty: 4 | Refills: 0 | OUTPATIENT
Start: 2019-04-08 | End: 2019-05-07

## 2019-04-08 RX ORDER — LISINOPRIL 2.5 MG/1
1 TABLET ORAL
Qty: 30 | Refills: 0
Start: 2019-04-08 | End: 2019-05-07

## 2019-04-08 RX ORDER — ATORVASTATIN CALCIUM 80 MG/1
1 TABLET, FILM COATED ORAL
Qty: 30 | Refills: 0
Start: 2019-04-08 | End: 2019-05-07

## 2019-04-08 RX ORDER — ISOPROPYL ALCOHOL, BENZOCAINE .7; .06 ML/ML; ML/ML
0 SWAB TOPICAL
Qty: 1 | Refills: 0
Start: 2019-04-08

## 2019-04-08 RX ORDER — THIAMINE MONONITRATE (VIT B1) 100 MG
1 TABLET ORAL
Qty: 30 | Refills: 0
Start: 2019-04-08 | End: 2019-05-07

## 2019-04-08 RX ORDER — INSULIN GLARGINE 100 [IU]/ML
13 INJECTION, SOLUTION SUBCUTANEOUS AT BEDTIME
Qty: 0 | Refills: 0 | Status: DISCONTINUED | OUTPATIENT
Start: 2019-04-08 | End: 2019-04-08

## 2019-04-08 RX ADMIN — Medication 2: at 09:06

## 2019-04-08 RX ADMIN — Medication 100 MILLIGRAM(S): at 12:43

## 2019-04-08 RX ADMIN — ENOXAPARIN SODIUM 40 MILLIGRAM(S): 100 INJECTION SUBCUTANEOUS at 05:22

## 2019-04-08 RX ADMIN — Medication 2: at 12:42

## 2019-04-08 RX ADMIN — LISINOPRIL 5 MILLIGRAM(S): 2.5 TABLET ORAL at 05:22

## 2019-04-08 RX ADMIN — Medication 5 UNIT(S): at 09:06

## 2019-04-08 RX ADMIN — Medication 5 UNIT(S): at 12:42

## 2019-04-08 RX ADMIN — Medication 1 TABLET(S): at 12:43

## 2019-04-08 NOTE — DISCHARGE NOTE PROVIDER - NSDCFUADDAPPT_GEN_ALL_CORE_FT
Isabella - diabetes educator on 5/7/19 @ 11 am   Dr. Valdovinos 7/12/19 @ 3:30 pm at [address] [place] [address]. [phone]

## 2019-04-08 NOTE — PROGRESS NOTE ADULT - REASON FOR ADMISSION
Tremors of nervous system, Diabetes-Type II, Alcohol dependence

## 2019-04-08 NOTE — PHYSICAL THERAPY INITIAL EVALUATION ADULT - PATIENT PROFILE REVIEW, REHAB EVAL
yes/No Formal Activity Order; spoke with KEMAL Mensah prior to PT evaluation--> Pt OK for PT consult/OOB activity

## 2019-04-08 NOTE — DISCHARGE NOTE PROVIDER - NSDCCPCAREPLAN_GEN_ALL_CORE_FT
PRINCIPAL DISCHARGE DIAGNOSIS  Diagnosis: Tremors of nervous system  Assessment and Plan of Treatment: You were admitted due to uncontrolled diabetes. Your A1C was 12.9% and your blood sugar was elevated. You were evaluated by the endocrinologist, who recommended starting insulin. You were started on 2 types of insulin (Lantus and Humalog). Please take Lantus at bed      SECONDARY DISCHARGE DIAGNOSES  Diagnosis: Alcohol dependence  Assessment and Plan of Treatment:     Diagnosis: Diabetes  Assessment and Plan of Treatment: PRINCIPAL DISCHARGE DIAGNOSIS  Diagnosis: Newly diagnosed diabetes  Assessment and Plan of Treatment: You were admitted due to uncontrolled diabetes. Your A1C was 12.9% and your blood sugar was elevated. You were evaluated by the endocrinologist, who recommended starting insulin. You were started on 2 types of insulin (Lantus and Humalog). Please take Lantus at bedtime (11 units) and Humalog before breakfast, lunch, and dinner (5 units). Please check your blood sugar using your glucometer before every meal and at bedtime. Please do not skip meals while on your insulin. Please follow up with your PCP Dr. Jeffrey on 4/18 at 10am. Please call the Endocrinology clinic at      SECONDARY DISCHARGE DIAGNOSES  Diagnosis: Alcohol dependence  Assessment and Plan of Treatment:     Diagnosis: Diabetes  Assessment and Plan of Treatment: PRINCIPAL DISCHARGE DIAGNOSIS  Diagnosis: Newly diagnosed diabetes  Assessment and Plan of Treatment: You were admitted due to uncontrolled diabetes. Your A1C was 12.9% and your blood sugar was elevated. You were evaluated by the endocrinologist, who recommended starting insulin. You were started on 2 types of insulin (Lantus and Humalog). Please take Lantus at bedtime (11 units) and Humalog before breakfast, lunch, and dinner (5 units). Please check your blood sugar using your glucometer before every meal and at bedtime. Please do not skip meals while on your insulin. Please follow up with your PCP Dr. Jeffrey on 4/18 at 10am. Please call the Endocrinology clinic at  [phone] to schedule an appointment.      SECONDARY DISCHARGE DIAGNOSES  Diagnosis: Alcohol dependence  Assessment and Plan of Treatment: Please reduce the amount of alcohol you consume. Drinking alcohol while on insulin puts you at risk for low blood sugar, which can be very dangerous. Please try to cut down on the amount of alcohol you drink in order to avoid these complications and the many other complications of excess alcohol consumption.    Diagnosis: Chest pain, atypical  Assessment and Plan of Treatment: You complained of chest pain on this admission. We performed an EKG, which showed evidence of some enlargement in the chamber of your heart, likely due to high blood pressure. We performed blood tests which showed no evidence of a heart attack. Please follow up with your PCP for further management.    Diagnosis: Essential hypertension  Assessment and Plan of Treatment: You were found to have high blood pressure on this admission. You were started on a blood pressure medication called Lisinopril. Please continue to take this medication daily. Please follow up with your PCP Dr. Jeffrey on 4/18 at 10am for further management.    Diagnosis: Diabetes  Assessment and Plan of Treatment: PRINCIPAL DISCHARGE DIAGNOSIS  Diagnosis: Newly diagnosed diabetes  Assessment and Plan of Treatment: You were admitted due to uncontrolled diabetes. Your A1C was 12.9% and your blood sugar was elevated. You were evaluated by the endocrinologist, who recommended starting insulin. You were started on 2 types of insulin (Lantus and Humalog). Please take Lantus at bedtime (11 units) and Humalog before breakfast, lunch, and dinner (5 units). Please check your blood sugar using your glucometer before every meal and at bedtime. Please do not skip meals while on your insulin. Please follow up with your PCP Dr. Jeffrey on 4/18 at 10am. You are scheduled to see Isabella diabetes educator on 5/7/19 @ 11 am & Dr. Valdovinos 7/12/19 @ 3:30 pm. Both appointments will be at [address] [place] [address] ([phone])      SECONDARY DISCHARGE DIAGNOSES  Diagnosis: Alcohol dependence  Assessment and Plan of Treatment: Please reduce the amount of alcohol you consume. Drinking alcohol while on insulin puts you at risk for low blood sugar, which can be very dangerous. Please try to cut down on the amount of alcohol you drink in order to avoid these complications and the many other complications of excess alcohol consumption.    Diagnosis: Chest pain, atypical  Assessment and Plan of Treatment: You complained of chest pain on this admission. We performed an EKG, which showed evidence of some enlargement in the chamber of your heart, likely due to high blood pressure. We performed blood tests which showed no evidence of a heart attack. Please follow up with your PCP for further management.    Diagnosis: Essential hypertension  Assessment and Plan of Treatment: You were found to have high blood pressure on this admission. You were started on a blood pressure medication called Lisinopril. Please continue to take this medication daily. Please follow up with your PCP Dr. Jeffrey on 4/18 at 10am for further management.    Diagnosis: Diabetes  Assessment and Plan of Treatment:

## 2019-04-08 NOTE — DISCHARGE NOTE PROVIDER - PROVIDER TOKENS
PROVIDER:[TOKEN:[[id number]]] PROVIDER:[TOKEN:[[id number]]],FREE:[LAST:[Jeffrey],PHONE:[(   )    -],FAX:[(   )    -],ADDRESS[facility] [place] [address] [facility] FREE:[LAST:[Jeffrey],PHONE:[(   )    -],FAX:[(   )    -],ADDRESS:[[address] [place] [address]]],PROVIDER:[TOKEN:[[id number]]]

## 2019-04-08 NOTE — DISCHARGE NOTE PROVIDER - HOSPITAL COURSE
49 y/o M w/ PMH HTN, HLD p/w headache, dizziness over last 3 weeks, admitted for uncontrolled DM (A1C 12.9%, FS 300s). Pt was not in DKA on admission. Pt hypertensive on admission, started on lisinopril 5mg daily. EKG showed LVH. Trops neg. Placed on CIWA symptom triggered for hx of EtOH abuse, but did not require any. Endocrine consulted, pt started on Lantus 11U nightly and Humalog 5U premeals. Diabetic education provided and pt taught how to self-administer insulin. Pt d/c'd to home in medically stable condition with scripts for insulin, diabetic supplies, and appt scheduled w/ PCP on 4/18 @10am and instructed to make f/u appt with endocrine clinic. 47 y/o M w/ PMH HTN, HLD p/w headache, dizziness over last 3 weeks, admitted for uncontrolled DM (A1C 12.9%, FS 300s). Pt was not in DKA on admission. Pt hypertensive on admission, started on lisinopril 5mg daily. EKG showed LVH. Trops neg. Placed on CIWA symptom triggered for hx of EtOH abuse, but did not require any. Endocrine consulted, pt started on Lantus 11U nightly and Humalog 5U premeals. Diabetic education provided and pt taught how to self-administer insulin. Pt d/c'd to home in medically stable condition with scripts for Novolin 70/30 (20U in AM and 10U in PM), diabetic supplies, and appt scheduled w/ PCP on 4/18 @10am and instructed to make f/u appt with endocrine clinic.

## 2019-04-08 NOTE — DISCHARGE NOTE PROVIDER - CARE PROVIDER_API CALL
Nicky Reddy (MD)  EndocrinologyMetabDiabetes; Internal Medicine  [address] [place] [address]  Phone: [phone]  Fax: [phone]  Follow Up Time: Nicky Reddy (MD)  EndocrinologyMetabDiabetes; Internal Medicine  [address] [place] [address]  Phone: [phone]  Fax: [phone]  Follow Up Time:     Jeffrey,   [address]  Phone: (   )    -  Fax: (   )    -  Follow Up Time: Wojciech,   [address] [place]  Phone: (   )    -  Fax: (   )    -  Follow Up Time:     Jammie Valdovinos (DO)  EndocrinologyMetabDiabetes; Internal Medicine  [address] [address]  Phone: [phone]  Fax: [phone]  Follow Up Time:

## 2019-04-08 NOTE — PROGRESS NOTE ADULT - PROBLEM SELECTOR PLAN 3
Has been having R sided chest pain radiating to neck intermittently, not usually associated with exertion. EKG showing LVH and J-point elevation in V2-V3  - hsTrop neg  - TTE pending

## 2019-04-08 NOTE — PHYSICAL THERAPY INITIAL EVALUATION ADULT - DISCHARGE DISPOSITION, PT EVAL
home/no skilled PT needs/Pt is completely independent, has met all goals and therefore will be taken off PT program

## 2019-04-08 NOTE — PROGRESS NOTE ADULT - PROBLEM SELECTOR PLAN 6
DVT Proph: enoxaparin  Diet: Carb controlled  Dispo: Home
DVT Proph: Lovenox  Diet: Carb controlled  Dispo: Home
DVT Proph: enoxaparin  Diet: Carb controlled  Dispo: Home

## 2019-04-08 NOTE — PROGRESS NOTE ADULT - ATTENDING COMMENTS
I spent 40 minutes coordinating this patient's discharge, explained the importance of OP F/U with PCP on April 18th at 10am.

## 2019-04-08 NOTE — CHART NOTE - NSCHARTNOTEFT_GEN_A_CORE
48M ETOH abuse no medical follow up found with new onset diabetes most likely DM2 HBA1c 12.9%.      CAPILLARY BLOOD GLUCOSE      POCT Blood Glucose.: 238 mg/dL (08 Apr 2019 08:26)  POCT Blood Glucose.: 292 mg/dL (07 Apr 2019 21:23)  POCT Blood Glucose.: 314 mg/dL (07 Apr 2019 17:26)      Vital Signs Last 24 Hrs  T(C): 36.4 (08 Apr 2019 05:20), Max: 37 (07 Apr 2019 13:13)  T(F): 97.6 (08 Apr 2019 05:20), Max: 98.6 (07 Apr 2019 13:13)  HR: 76 (08 Apr 2019 05:20) (72 - 94)  BP: 116/86 (08 Apr 2019 05:20) (106/75 - 136/95)  BP(mean): --  RR: 17 (08 Apr 2019 05:20) (16 - 18)  SpO2: 100% (08 Apr 2019 05:20) (98% - 100%)      Patient was not at bedside at time of visit      Plan- increase lantus to 13 units sq qhs and c/w humalog 5 units sq tid ac    dc planning-     Send prescriptions for Lantus solostar pen 13 units sq qhs and humalog kwikpen 5 units sq tid ac- to pharmacy for insurance coverage verification.  If not covered try Tresiba in place of Lantus and Novolog flex touch pen in place of humalog kwikpen at same doses.    Patioent will also need glucomter, test strips, lancets, alcohol pads, and BD ragini pen needles    Patien can follow up at [address] [place] [address] [phone].  Office scheduling appointment.  Addednum to follow 48M ETOH abuse no medical follow up found with new onset diabetes most likely DM2 HBA1c 12.9%.      CAPILLARY BLOOD GLUCOSE      POCT Blood Glucose.: 238 mg/dL (08 Apr 2019 08:26)  POCT Blood Glucose.: 292 mg/dL (07 Apr 2019 21:23)  POCT Blood Glucose.: 314 mg/dL (07 Apr 2019 17:26)      Vital Signs Last 24 Hrs  T(C): 36.4 (08 Apr 2019 05:20), Max: 37 (07 Apr 2019 13:13)  T(F): 97.6 (08 Apr 2019 05:20), Max: 98.6 (07 Apr 2019 13:13)  HR: 76 (08 Apr 2019 05:20) (72 - 94)  BP: 116/86 (08 Apr 2019 05:20) (106/75 - 136/95)  BP(mean): --  RR: 17 (08 Apr 2019 05:20) (16 - 18)  SpO2: 100% (08 Apr 2019 05:20) (98% - 100%)      Patient was not at bedside at time of visit      Plan- increase lantus to 13 units sq qhs and c/w humalog 5 units sq tid ac    dc planning-     Send prescriptions for Lantus solostar pen 13 units sq qhs and humalog kwikpen 5 units sq tid ac- to pharmacy for insurance coverage verification.  If not covered try Tresiba in place of Lantus and Novolog flex touch pen in place of humalog kwikpen at same doses.    Patioent will also need glucomter, test strips, lancets, alcohol pads, and BD ragini pen needles    Patien can follow up at [address] [place] [address] [phone].  Office scheduling appointment.  Addednum to follow        ******************************ADDENDUM: [id number]  Patient insurance covers Basaglar and Novolog, however patient has high deductible resulting in high copay.      Therefore, patient prefers Novolin 70/30 VIAL and Syringe, Doses are as follows:   - 20 units pre breakfast  - 10 units pre dinner    Patient will need syringes instead of pen needles for d/c in addition to the other above supplies. 48M ETOH abuse no medical follow up found with new onset diabetes most likely DM2 HBA1c 12.9%.      CAPILLARY BLOOD GLUCOSE      POCT Blood Glucose.: 238 mg/dL (08 Apr 2019 08:26)  POCT Blood Glucose.: 292 mg/dL (07 Apr 2019 21:23)  POCT Blood Glucose.: 314 mg/dL (07 Apr 2019 17:26)      Vital Signs Last 24 Hrs  T(C): 36.4 (08 Apr 2019 05:20), Max: 37 (07 Apr 2019 13:13)  T(F): 97.6 (08 Apr 2019 05:20), Max: 98.6 (07 Apr 2019 13:13)  HR: 76 (08 Apr 2019 05:20) (72 - 94)  BP: 116/86 (08 Apr 2019 05:20) (106/75 - 136/95)  BP(mean): --  RR: 17 (08 Apr 2019 05:20) (16 - 18)  SpO2: 100% (08 Apr 2019 05:20) (98% - 100%)      Patient was not at bedside at time of visit      Plan- increase lantus to 13 units sq qhs and c/w humalog 5 units sq tid ac    dc planning-     Send prescriptions for Lantus solostar pen 13 units sq qhs and humalog kwikpen 5 units sq tid ac- to pharmacy for insurance coverage verification.  If not covered try Tresiba in place of Lantus and Novolog flex touch pen in place of humalog kwikpen at same doses.    Patioent will also need glucomter, test strips, lancets, alcohol pads, and BD ragini pen needles    Patien can follow up at [address] [place] [address] [phone].  Office scheduling appointment.  Addednum to follow        ******************************ADDENDUM: [id number]  Patient insurance covers Basaglar and Novolog, however patient has high deductible resulting in high copay.      Therefore, patient prefers Novolin 70/30 VIAL and Syringe, Doses are as follows:   - 20 units pre breakfast  - 10 units pre dinner    Patient is scheduled to see Isabella diabetes educator on 5/7/19 @ 11 am & Dr. Valdovinos 7/12/19 @ 3:30 pm. Both appointments will be at [address] [place] [address]. [phone]    Patient will need syringes instead of pen needles for d/c in addition to the other above supplies.

## 2019-04-08 NOTE — DISCHARGE NOTE NURSING/CASE MANAGEMENT/SOCIAL WORK - NSDCDPATPORTLINK_GEN_ALL_CORE
You can access the [facility] Patient Portal, offered by [facility], by registering with the following website: [email or web address]

## 2019-04-08 NOTE — PROGRESS NOTE ADULT - ASSESSMENT
49 yo M PMHx of HTN, HLD p/w headache, blurry vision, found to have new diagnosis of uncontrolled DM2, also with concern for EtOH withdrawal.

## 2019-04-08 NOTE — PROGRESS NOTE ADULT - SUBJECTIVE AND OBJECTIVE BOX
Reji Jimenez PGY-1   [facility] Pager # [phone]  NS Pager # [phone]      Patient is a 48y old  Male who presents with a chief complaint of Tremors of nervous system, Diabetes-Type II, Alcohol dependence (07 Apr 2019 12:24)      SUBJECTIVE: No acute events overnight. Patient seen and examined at bedside.      MEDICATIONS  (STANDING):  atorvastatin 40 milliGRAM(s) Oral at bedtime  dextrose 5%. 1000 milliLiter(s) (50 mL/Hr) IV Continuous <Continuous>  dextrose 50% Injectable 12.5 Gram(s) IV Push once  dextrose 50% Injectable 25 Gram(s) IV Push once  dextrose 50% Injectable 25 Gram(s) IV Push once  enoxaparin Injectable 40 milliGRAM(s) SubCutaneous every 24 hours  influenza   Vaccine 0.5 milliLiter(s) IntraMuscular once  insulin glargine Injectable (LANTUS) 11 Unit(s) SubCutaneous at bedtime  insulin lispro (HumaLOG) corrective regimen sliding scale   SubCutaneous three times a day before meals  insulin lispro (HumaLOG) corrective regimen sliding scale   SubCutaneous at bedtime  insulin lispro Injectable (HumaLOG) 5 Unit(s) SubCutaneous three times a day before meals  lisinopril 5 milliGRAM(s) Oral daily  multivitamin 1 Tablet(s) Oral daily  sodium chloride 0.9% 1000 milliLiter(s) (100 mL/Hr) IV Continuous <Continuous>  thiamine 100 milliGRAM(s) Oral daily    MEDICATIONS  (PRN):  acetaminophen   Tablet .. 325 milliGRAM(s) Oral every 4 hours PRN Mild Pain (1 - 3), Moderate Pain (4 - 6)  dextrose 40% Gel 15 Gram(s) Oral once PRN Blood Glucose LESS THAN 70 milliGRAM(s)/deciLiter  glucagon  Injectable 1 milliGRAM(s) IntraMuscular once PRN Glucose <70 milliGRAM(s)/deciLiter  LORazepam     Tablet 2 milliGRAM(s) Oral every 2 hours PRN CIWA-Ar score increase by 2 points and a total score of 7 or less  LORazepam     Tablet 2 milliGRAM(s) Oral every 1 hour PRN CIWA-Ar score 8 or greater        Objective:    Vitals: Vital Signs Last 24 Hrs  T(C): 36.4 (04-08-19 @ 05:20), Max: 37 (04-07-19 @ 09:36)  T(F): 97.6 (04-08-19 @ 05:20), Max: 98.6 (04-07-19 @ 09:36)  HR: 76 (04-08-19 @ 05:20) (72 - 96)  BP: 116/86 (04-08-19 @ 05:20) (106/75 - 140/90)  BP(mean): --  RR: 17 (04-08-19 @ 05:20) (16 - 18)  SpO2: 100% (04-08-19 @ 05:20) (98% - 100%)            I&O's Summary    07 Apr 2019 07:01  -  08 Apr 2019 07:00  --------------------------------------------------------  IN: 0 mL / OUT: 275 mL / NET: -275 mL      PHYSICAL EXAM:  GENERAL: NAD  HEAD:  Atraumatic, Normocephalic,   ENT:  No tonsillar erythema, exudates; Moist mucous membranes, nose disproportionately large compared w/ other facial features  CHEST/LUNG: Clear to auscultation bilaterally; No rales or wheezing  HEART: Regular rate and rhythm; No murmurs, rubs, or gallops  ABDOMEN: Soft, nontender, nondistended  EXTREMITIES:  No clubbing, cyanosis, or edema  LYMPH: No lymphadenopathy noted  SKIN: No rashes or lesions  NERVOUS SYSTEM:  Alert & Oriented X3      LABS:  04-08    133<L>  |  98  |  14  ----------------------------<  245<H>  3.8   |  24  |  0.61  04-07    133<L>  |  98  |  12  ----------------------------<  251<H>  4.6   |  22  |  0.64  04-06    133<L>  |  98  |  12  ----------------------------<  262<H>  4.6   |  23  |  0.61    Ca    8.7      08 Apr 2019 05:15  Ca    9.2      07 Apr 2019 05:30  Ca    8.1<L>      06 Apr 2019 06:40  Phos  4.1     04-08  Mg     2.1     04-08    TPro  6.1  /  Alb  3.3  /  TBili  0.2  /  DBili  x   /  AST  19  /  ALT  23  /  AlkPhos  68  04-06  TPro  7.9  /  Alb  4.5  /  TBili  0.6  /  DBili  x   /  AST  23  /  ALT  28  /  AlkPhos  103  04-05                                            15.0   6.40  )-----------( 169      ( 06 Apr 2019 06:45 )             45.7                         17.0   7.61  )-----------( 200      ( 05 Apr 2019 15:11 )             49.5     CAPILLARY BLOOD GLUCOSE      POCT Blood Glucose.: 292 mg/dL (07 Apr 2019 21:23)  POCT Blood Glucose.: 314 mg/dL (07 Apr 2019 17:26)  POCT Blood Glucose.: 335 mg/dL (07 Apr 2019 11:55)  POCT Blood Glucose.: 339 mg/dL (07 Apr 2019 08:12)    RADIOLOGY:  Imaging Personally Reviewed: YES      Consultants involved in case: YES  Consultant(s) Notes Reviewed:  YES  Care Discussed with Consultants/Other Providers       Reji Jimenez, PGY-1

## 2019-04-08 NOTE — DISCHARGE NOTE PROVIDER - CARE PROVIDERS DIRECT ADDRESSES
,[email or web address],DirectAddress_Unknown ,DirectAddress_Unknown,DirectAddress_Unknown

## 2019-04-08 NOTE — PROGRESS NOTE ADULT - PROBLEM SELECTOR PROBLEM 1
Newly diagnosed diabetes
Uncontrolled type 2 diabetes mellitus with hyperglycemia

## 2019-04-08 NOTE — DISCHARGE NOTE NURSING/CASE MANAGEMENT/SOCIAL WORK - NSDCFUADDAPPT_GEN_ALL_CORE_FT
Isabella - diabetes educator on 5/7/19 @ 11 am   Dr. Valdovinos 7/12/19 @ 3:30 pm at [address] [place] [address]. [phone]

## 2019-04-08 NOTE — PHYSICAL THERAPY INITIAL EVALUATION ADULT - ADDITIONAL COMMENTS
Pt reports that he lives in an apartment with his wife with a flight of stairs to enter; (+)bilateral handrails; elevator inside. Prior to hospital admission pt was completely independent and used no assistive device with ambulation. Pt denies any recent falls.    Pt left comfortable in bed, NAD, all lines intact, all precautions maintained, with call bell in reach, and RN aware of PT evaluation.

## 2019-04-11 LAB — HEAVY MET PNL SERPL: SIGNIFICANT CHANGE UP

## 2019-05-02 RX ORDER — LISINOPRIL 5 MG/1
5 TABLET ORAL DAILY
Qty: 30 | Refills: 0 | Status: ACTIVE | COMMUNITY
Start: 2019-05-02

## 2019-05-02 RX ORDER — BLOOD-GLUCOSE METER
KIT MISCELLANEOUS 3 TIMES DAILY
Qty: 100 | Refills: 0 | Status: ACTIVE | COMMUNITY
Start: 2019-05-02

## 2019-05-02 RX ORDER — ATORVASTATIN CALCIUM 40 MG/1
40 TABLET, FILM COATED ORAL
Qty: 30 | Refills: 0 | Status: ACTIVE | COMMUNITY
Start: 2019-05-02

## 2019-05-02 RX ORDER — SYRING-NEEDL,DISP,INSUL,0.3 ML 31 GX5/16"
31G X 5/16" SYRINGE, EMPTY DISPOSABLE MISCELLANEOUS
Qty: 1 | Refills: 0 | Status: ACTIVE | COMMUNITY
Start: 2019-05-02

## 2019-05-02 RX ORDER — LANCETS 33 GAUGE
EACH MISCELLANEOUS
Qty: 100 | Refills: 0 | Status: ACTIVE | COMMUNITY
Start: 2019-05-02

## 2019-05-07 ENCOUNTER — APPOINTMENT (OUTPATIENT)
Dept: ENDOCRINOLOGY | Facility: CLINIC | Age: 49
End: 2019-05-07
Payer: COMMERCIAL

## 2019-05-07 PROCEDURE — G0108 DIAB MANAGE TRN  PER INDIV: CPT

## 2019-05-10 ENCOUNTER — OTHER (OUTPATIENT)
Age: 49
End: 2019-05-10

## 2019-05-30 ENCOUNTER — CLINICAL ADVICE (OUTPATIENT)
Age: 49
End: 2019-05-30

## 2019-05-30 RX ORDER — HUMAN INSULIN 100 [IU]/ML
(70-30) 100 INJECTION, SUSPENSION SUBCUTANEOUS
Qty: 3 | Refills: 0 | Status: ACTIVE | COMMUNITY
Start: 2019-05-02 | End: 1900-01-01

## 2019-07-12 ENCOUNTER — RESULT CHARGE (OUTPATIENT)
Age: 49
End: 2019-07-12

## 2019-07-12 ENCOUNTER — APPOINTMENT (OUTPATIENT)
Dept: ENDOCRINOLOGY | Facility: CLINIC | Age: 49
End: 2019-07-12
Payer: COMMERCIAL

## 2019-07-12 VITALS
SYSTOLIC BLOOD PRESSURE: 124 MMHG | HEIGHT: 63 IN | HEART RATE: 69 BPM | WEIGHT: 128 LBS | OXYGEN SATURATION: 98 % | BODY MASS INDEX: 22.68 KG/M2 | DIASTOLIC BLOOD PRESSURE: 80 MMHG

## 2019-07-12 DIAGNOSIS — E78.5 HYPERLIPIDEMIA, UNSPECIFIED: ICD-10-CM

## 2019-07-12 DIAGNOSIS — E11.9 TYPE 2 DIABETES MELLITUS W/OUT COMPLICATIONS: ICD-10-CM

## 2019-07-12 DIAGNOSIS — I10 ESSENTIAL (PRIMARY) HYPERTENSION: ICD-10-CM

## 2019-07-12 LAB
GLUCOSE BLDC GLUCOMTR-MCNC: 159
HBA1C MFR BLD HPLC: 6.2

## 2019-07-12 PROCEDURE — 82962 GLUCOSE BLOOD TEST: CPT

## 2019-07-12 PROCEDURE — 83036 HEMOGLOBIN GLYCOSYLATED A1C: CPT | Mod: QW

## 2019-07-12 PROCEDURE — 99204 OFFICE O/P NEW MOD 45 MIN: CPT | Mod: 25

## 2019-07-12 RX ORDER — GLIMEPIRIDE 1 MG/1
1 TABLET ORAL
Qty: 90 | Refills: 3 | Status: ACTIVE | COMMUNITY
Start: 2019-07-12 | End: 1900-01-01

## 2019-07-12 RX ORDER — BLOOD SUGAR DIAGNOSTIC
STRIP MISCELLANEOUS
Qty: 2 | Refills: 2 | Status: ACTIVE | COMMUNITY
Start: 2019-07-12 | End: 1900-01-01

## 2019-07-12 RX ORDER — METFORMIN HYDROCHLORIDE 500 MG/1
500 TABLET, COATED ORAL
Qty: 120 | Refills: 4 | Status: ACTIVE | COMMUNITY
Start: 2019-07-12 | End: 1900-01-01

## 2019-07-15 LAB
CREAT SPEC-SCNC: 76 MG/DL
MICROALBUMIN 24H UR DL<=1MG/L-MCNC: 1.3 MG/DL
MICROALBUMIN/CREAT 24H UR-RTO: 17 MG/G

## 2019-10-18 ENCOUNTER — APPOINTMENT (OUTPATIENT)
Dept: ENDOCRINOLOGY | Facility: CLINIC | Age: 49
End: 2019-10-18

## 2020-02-24 NOTE — ED PROVIDER NOTE - CONSTITUTIONAL, MLM
normal... Well appearing, well nourished, awake, alert, oriented to person, place, time/situation and in no apparent distress. No

## 2020-09-16 ENCOUNTER — EMERGENCY (EMERGENCY)
Facility: HOSPITAL | Age: 50
LOS: 1 days | Discharge: HOME | End: 2020-09-16
Attending: EMERGENCY MEDICINE | Admitting: EMERGENCY MEDICINE
Payer: COMMERCIAL

## 2020-09-16 VITALS
DIASTOLIC BLOOD PRESSURE: 99 MMHG | OXYGEN SATURATION: 100 % | SYSTOLIC BLOOD PRESSURE: 169 MMHG | HEIGHT: 62 IN | RESPIRATION RATE: 20 BRPM | TEMPERATURE: 98 F | HEART RATE: 107 BPM

## 2020-09-16 DIAGNOSIS — Z87.828 PERSONAL HISTORY OF OTHER (HEALED) PHYSICAL INJURY AND TRAUMA: Chronic | ICD-10-CM

## 2020-09-16 LAB
ALBUMIN SERPL ELPH-MCNC: 4.5 G/DL — SIGNIFICANT CHANGE UP (ref 3.3–5)
ALP SERPL-CCNC: 94 U/L — SIGNIFICANT CHANGE UP (ref 40–120)
ALT FLD-CCNC: 70 U/L — HIGH (ref 4–41)
ANION GAP SERPL CALC-SCNC: 19 MMO/L — HIGH (ref 7–14)
APAP SERPL-MCNC: < 15 UG/ML — LOW (ref 15–25)
AST SERPL-CCNC: 36 U/L — SIGNIFICANT CHANGE UP (ref 4–40)
BASE EXCESS BLDV CALC-SCNC: 3.4 MMOL/L — SIGNIFICANT CHANGE UP
BASOPHILS # BLD AUTO: 0.03 K/UL — SIGNIFICANT CHANGE UP (ref 0–0.2)
BASOPHILS NFR BLD AUTO: 0.4 % — SIGNIFICANT CHANGE UP (ref 0–2)
BILIRUB SERPL-MCNC: 1.1 MG/DL — SIGNIFICANT CHANGE UP (ref 0.2–1.2)
BLOOD GAS VENOUS - CREATININE: 0.54 MG/DL — SIGNIFICANT CHANGE UP (ref 0.5–1.3)
BUN SERPL-MCNC: 10 MG/DL — SIGNIFICANT CHANGE UP (ref 7–23)
CALCIUM SERPL-MCNC: 9.4 MG/DL — SIGNIFICANT CHANGE UP (ref 8.4–10.5)
CHLORIDE BLDV-SCNC: 99 MMOL/L — SIGNIFICANT CHANGE UP (ref 96–108)
CHLORIDE SERPL-SCNC: 87 MMOL/L — LOW (ref 98–107)
CO2 SERPL-SCNC: 23 MMOL/L — SIGNIFICANT CHANGE UP (ref 22–31)
CREAT SERPL-MCNC: 0.48 MG/DL — LOW (ref 0.5–1.3)
EOSINOPHIL # BLD AUTO: 0.02 K/UL — SIGNIFICANT CHANGE UP (ref 0–0.5)
EOSINOPHIL NFR BLD AUTO: 0.3 % — SIGNIFICANT CHANGE UP (ref 0–6)
ETHANOL BLD-MCNC: < 10 MG/DL — SIGNIFICANT CHANGE UP
GAS PNL BLDV: 127 MMOL/L — LOW (ref 136–146)
GLUCOSE BLDV-MCNC: 303 MG/DL — HIGH (ref 70–99)
GLUCOSE SERPL-MCNC: 306 MG/DL — HIGH (ref 70–99)
HCO3 BLDV-SCNC: 28 MMOL/L — HIGH (ref 20–27)
HCT VFR BLD CALC: 45.8 % — SIGNIFICANT CHANGE UP (ref 39–50)
HCT VFR BLDV CALC: 49.1 % — SIGNIFICANT CHANGE UP (ref 39–51)
HGB BLD-MCNC: 15.5 G/DL — SIGNIFICANT CHANGE UP (ref 13–17)
HGB BLDV-MCNC: 16 G/DL — SIGNIFICANT CHANGE UP (ref 13–17)
IMM GRANULOCYTES NFR BLD AUTO: 0.7 % — SIGNIFICANT CHANGE UP (ref 0–1.5)
LACTATE BLDV-MCNC: 2.4 MMOL/L — HIGH (ref 0.5–2)
LYMPHOCYTES # BLD AUTO: 1.69 K/UL — SIGNIFICANT CHANGE UP (ref 1–3.3)
LYMPHOCYTES # BLD AUTO: 22.9 % — SIGNIFICANT CHANGE UP (ref 13–44)
MCHC RBC-ENTMCNC: 30.2 PG — SIGNIFICANT CHANGE UP (ref 27–34)
MCHC RBC-ENTMCNC: 33.8 % — SIGNIFICANT CHANGE UP (ref 32–36)
MCV RBC AUTO: 89.1 FL — SIGNIFICANT CHANGE UP (ref 80–100)
MONOCYTES # BLD AUTO: 0.61 K/UL — SIGNIFICANT CHANGE UP (ref 0–0.9)
MONOCYTES NFR BLD AUTO: 8.3 % — SIGNIFICANT CHANGE UP (ref 2–14)
NEUTROPHILS # BLD AUTO: 4.98 K/UL — SIGNIFICANT CHANGE UP (ref 1.8–7.4)
NEUTROPHILS NFR BLD AUTO: 67.4 % — SIGNIFICANT CHANGE UP (ref 43–77)
NRBC # FLD: 0 K/UL — SIGNIFICANT CHANGE UP (ref 0–0)
PCO2 BLDV: 38 MMHG — LOW (ref 41–51)
PH BLDV: 7.47 PH — HIGH (ref 7.32–7.43)
PLATELET # BLD AUTO: 133 K/UL — LOW (ref 150–400)
PMV BLD: 10.2 FL — SIGNIFICANT CHANGE UP (ref 7–13)
PO2 BLDV: 56 MMHG — HIGH (ref 35–40)
POTASSIUM BLDV-SCNC: 3.5 MMOL/L — SIGNIFICANT CHANGE UP (ref 3.4–4.5)
POTASSIUM SERPL-MCNC: 3.8 MMOL/L — SIGNIFICANT CHANGE UP (ref 3.5–5.3)
POTASSIUM SERPL-SCNC: 3.8 MMOL/L — SIGNIFICANT CHANGE UP (ref 3.5–5.3)
PROT SERPL-MCNC: 7.6 G/DL — SIGNIFICANT CHANGE UP (ref 6–8.3)
RBC # BLD: 5.14 M/UL — SIGNIFICANT CHANGE UP (ref 4.2–5.8)
RBC # FLD: 12.3 % — SIGNIFICANT CHANGE UP (ref 10.3–14.5)
SALICYLATES SERPL-MCNC: < 5 MG/DL — LOW (ref 15–30)
SAO2 % BLDV: 89.3 % — HIGH (ref 60–85)
SODIUM SERPL-SCNC: 129 MMOL/L — LOW (ref 135–145)
WBC # BLD: 7.38 K/UL — SIGNIFICANT CHANGE UP (ref 3.8–10.5)
WBC # FLD AUTO: 7.38 K/UL — SIGNIFICANT CHANGE UP (ref 3.8–10.5)

## 2020-09-16 PROCEDURE — 71111 X-RAY EXAM RIBS/CHEST4/> VWS: CPT | Mod: 26

## 2020-09-16 PROCEDURE — 99284 EMERGENCY DEPT VISIT MOD MDM: CPT

## 2020-09-16 RX ORDER — SODIUM CHLORIDE 9 MG/ML
1000 INJECTION INTRAMUSCULAR; INTRAVENOUS; SUBCUTANEOUS ONCE
Refills: 0 | Status: COMPLETED | OUTPATIENT
Start: 2020-09-16 | End: 2020-09-16

## 2020-09-16 RX ADMIN — SODIUM CHLORIDE 1000 MILLILITER(S): 9 INJECTION INTRAMUSCULAR; INTRAVENOUS; SUBCUTANEOUS at 23:53

## 2020-09-16 NOTE — ED PROVIDER NOTE - ATTENDING CONTRIBUTION TO CARE
Dr. Higgins:  I have personally performed a face to face bedside history and physical examination of this patient. I have discussed the history, examination, review of systems, assessment and plan of management with the resident. I have reviewed the electronic medical record and amended it to reflect my history, review of systems, physical exam, assessment and plan.    49M h/o HTN, DM, heavy ETOH drinker, presents with anterior and right sided ribcage pain after mechanical trip while going up the stairs yesterday.  +Pleuritic component to pain.  ROS otherwise negative.    Exam:  - nad  - tachy  - ctab, +TTP right anterior lower ribs   -abd soft ntnd  - +mild tremors    A/P  - fall, eval rib fractures  - tachy and tremulous, possibly alcohol withdrawal  - cbc, cmp, ekg, CXR/CT chest

## 2020-09-16 NOTE — ED ADULT NURSE NOTE - NSIMPLEMENTINTERV_GEN_ALL_ED
Implemented All Fall Risk Interventions:  [place] to call system. Call bell, personal items and telephone within reach. Instruct patient to call for assistance. Room bathroom lighting operational. Non-slip footwear when patient is off stretcher. Physically safe environment: no spills, clutter or unnecessary equipment. Stretcher in lowest position, wheels locked, appropriate side rails in place. Provide visual cue, wrist band, yellow gown, etc. Monitor gait and stability. Monitor for mental status changes and reorient to person, place, and time. Review medications for side effects contributing to fall risk. Reinforce activity limits and safety measures with patient and family.

## 2020-09-16 NOTE — ED PROVIDER NOTE - NSFOLLOWUPINSTRUCTIONS_ED_ALL_ED_FT
1) Please follow-up with your primary care doctor in the next 2-3 days.  Please call tomorrow for an appointment.  If you cannot follow-up with your primary care doctor please return to the ED for any urgent issues. Please also follow up with your Endocrinologist in 2-5 days. If you don't have a Endocrinologist the number to [facility] cardiology is provided above and an appt will be set up for you.  2) You were given a copy of the tests performed today.  Please bring the results with you and review them with your primary care doctor.  3) If you have any worsening of symptoms or any other concerns please return to the ED immediately. Such as but not limited to including uncontrolled chest pain, shortness or breath, or weakness.  4) Please continue taking your home medications as directed. Please take Motrin (Ibuprofen) 600mg by mouth every 6 hours as needed for pain. Please take this medication with food. Please take Tylenol (Acetaminophen) 1000 mg every 6 hours as needed for pain. Please do not exceed more than 4,000mg of Tylenol in a day. Please  lidocaine 4% (salonpas) patch from over the counter at any pharmacy follow directions on the package

## 2020-09-16 NOTE — ED PROVIDER NOTE - PROGRESS NOTE DETAILS
Axel Armendariz MD: CT showing rib fracture. Pt repeat bmp showing decrease in anion gap to 16. Spoke to pt who is only on oral glycemic control. state that he f/u with endo who took him off of insulin due to episodes of hyperglycemia. Pt no long having tremors. Pt well appearing and asymptomatic. Pt is ambulatory and tolerating PO. Spoke with pt about return precautions. Pt agrees to follow up with their PCP and Endocrine. Pt ready for discharge. Will put in dc binder for f/u with endo

## 2020-09-16 NOTE — ED PROVIDER NOTE - PHYSICAL EXAMINATION
Gen: AAOx3, non-toxic  Head: NCAT  HEENT: EOMI, PERRLA, oral mucosa moist, normal conjunctiva  Lung: CTAB, no respiratory distress, no wheezes/rhonchi/rales B/L, speaking in full sentences  CV: RRR, no murmurs, rubs or gallops, TTP of the right anterior lateral thoracic wall.   Abd: soft, NTND, no guarding, no CVA tenderness, no rebound tenderness  MSK: no visible deformities, full range of motion of all 4 exts, +tremors hands b/l   Neuro: No focal sensory or motor deficits  Skin: Warm, well perfused, no rash  Psych: normal affect.   ~Axel Armendariz MD

## 2020-09-16 NOTE — ED PROVIDER NOTE - NS ED ROS FT
GENERAL: No fever or chills, EYES: no change in vision, HEENT: no trouble speaking, CARDIAC: + chest pain, palpitation PULMONARY: no cough or +SOB, GI: no abdominal pain, + nausea, no vomiting, no diarrhea or constipation, : No changes in urination, SKIN: no rashes, NEURO: no headache,  MSK: No muscle pain ~Axel Armendariz MD

## 2020-09-16 NOTE — ED ADULT TRIAGE NOTE - CHIEF COMPLAINT QUOTE
pt c/o having right sided/ midsternal chest pain with SOB starting yesterday after slipping and falling down 12 steps yesterday. pt denies blood thinner use. pt denies LOC, dizziness, n/v/d, headache pmh htn ,DM

## 2020-09-16 NOTE — ED PROVIDER NOTE - PATIENT PORTAL LINK FT
You can access the FollowMyHealth Patient Portal offered by [facility] by registering at the following website: [email or web address]. By joining [facility]’s FollowMyHealth portal, you will also be able to view your health information using other applications (apps) compatible with our system.

## 2020-09-16 NOTE — ED ADULT NURSE NOTE - OBJECTIVE STATEMENT
50y/o male aaox4 and ambulatory c/o R rib pain. Pt states he fell forward and slid down stairs; pt denies hitting head. Pt states pain with deep breaths. Pt admits to binge drinking; pt states he drinks 2-3 days at a time then "takes a break." Pt states last drink on Monday. Pt states he was drinking Smirnoff during the most recent binge. Pt denies hx of withdrawal. Pt states he has not eaten; pt states when he stops drinking he drinks a lot of water and tea to "detox." Vital signs as noted. 18g in RAC; labs collected and sent as per MD order. Minor tremor observed. Pt denies dizziness, HA, N+V, diarrhea, hallucinations, SOB. Call light within reach. Will continue to monitor.

## 2020-09-16 NOTE — ED PROVIDER NOTE - OBJECTIVE STATEMENT
Axel Armendariz MD: 50 yo M PMH of HTN, DM2, EtOH abuse ASA 81mg p/w right rib pain and SOB s/p mech fall yesterday. Pt state that his pain started after he was walking up the stairs when he tripped and fell forward and slid down 15 stairs.  Pt report pain with deep breaths and palpation. Pt states that its difficult to breath due to the pain. Pt denies hitting his head, LOC, visual changes, or n/v. Pt report that he binge drinks EtOH for 3days at a time drinking 1 pint per day. Last EtoH use was 3 days ago. Pt unsure if he has ever had a EtOH withdrawal in the past but states that every time he takes a break btw drinking he feels unwell. Report decrease PO intake.

## 2020-09-17 VITALS
HEART RATE: 85 BPM | SYSTOLIC BLOOD PRESSURE: 154 MMHG | OXYGEN SATURATION: 100 % | RESPIRATION RATE: 18 BRPM | DIASTOLIC BLOOD PRESSURE: 99 MMHG | TEMPERATURE: 98 F

## 2020-09-17 LAB
ANION GAP SERPL CALC-SCNC: 16 MMO/L — HIGH (ref 7–14)
BASE EXCESS BLDV CALC-SCNC: 1.3 MMOL/L — SIGNIFICANT CHANGE UP
BLOOD GAS VENOUS - CREATININE: 0.57 MG/DL — SIGNIFICANT CHANGE UP (ref 0.5–1.3)
BLOOD GAS VENOUS - FIO2: 21 — SIGNIFICANT CHANGE UP
BUN SERPL-MCNC: 9 MG/DL — SIGNIFICANT CHANGE UP (ref 7–23)
CALCIUM SERPL-MCNC: 7.9 MG/DL — LOW (ref 8.4–10.5)
CHLORIDE BLDV-SCNC: 105 MMOL/L — SIGNIFICANT CHANGE UP (ref 96–108)
CHLORIDE SERPL-SCNC: 97 MMOL/L — LOW (ref 98–107)
CO2 SERPL-SCNC: 21 MMOL/L — LOW (ref 22–31)
CREAT SERPL-MCNC: 0.46 MG/DL — LOW (ref 0.5–1.3)
GAS PNL BLDV: 134 MMOL/L — LOW (ref 136–146)
GLUCOSE BLDV-MCNC: 245 MG/DL — HIGH (ref 70–99)
GLUCOSE SERPL-MCNC: 249 MG/DL — HIGH (ref 70–99)
HBA1C BLD-MCNC: 9.1 % — HIGH (ref 4–5.6)
HCO3 BLDV-SCNC: 25 MMOL/L — SIGNIFICANT CHANGE UP (ref 20–27)
HCT VFR BLDV CALC: 42.4 % — SIGNIFICANT CHANGE UP (ref 39–51)
HGB BLDV-MCNC: 13.8 G/DL — SIGNIFICANT CHANGE UP (ref 13–17)
LACTATE BLDV-MCNC: 2.3 MMOL/L — HIGH (ref 0.5–2)
PCO2 BLDV: 41 MMHG — SIGNIFICANT CHANGE UP (ref 41–51)
PH BLDV: 7.41 PH — SIGNIFICANT CHANGE UP (ref 7.32–7.43)
PO2 BLDV: 45 MMHG — HIGH (ref 35–40)
POTASSIUM BLDV-SCNC: 3.6 MMOL/L — SIGNIFICANT CHANGE UP (ref 3.4–4.5)
POTASSIUM SERPL-MCNC: 3.9 MMOL/L — SIGNIFICANT CHANGE UP (ref 3.5–5.3)
POTASSIUM SERPL-SCNC: 3.9 MMOL/L — SIGNIFICANT CHANGE UP (ref 3.5–5.3)
SAO2 % BLDV: 78.6 % — SIGNIFICANT CHANGE UP (ref 60–85)
SODIUM SERPL-SCNC: 134 MMOL/L — LOW (ref 135–145)

## 2020-09-17 PROCEDURE — 71250 CT THORAX DX C-: CPT | Mod: 26

## 2020-09-17 RX ORDER — SODIUM CHLORIDE 9 MG/ML
1000 INJECTION, SOLUTION INTRAVENOUS
Refills: 0 | Status: DISCONTINUED | OUTPATIENT
Start: 2020-09-17 | End: 2020-09-20

## 2020-09-17 RX ORDER — SODIUM CHLORIDE 9 MG/ML
1000 INJECTION INTRAMUSCULAR; INTRAVENOUS; SUBCUTANEOUS ONCE
Refills: 0 | Status: COMPLETED | OUTPATIENT
Start: 2020-09-17 | End: 2020-09-17

## 2020-09-17 RX ADMIN — SODIUM CHLORIDE 1000 MILLILITER(S): 9 INJECTION INTRAMUSCULAR; INTRAVENOUS; SUBCUTANEOUS at 00:19

## 2020-09-17 RX ADMIN — SODIUM CHLORIDE 1000 MILLILITER(S): 9 INJECTION, SOLUTION INTRAVENOUS at 03:43

## 2022-04-22 NOTE — ED ADULT TRIAGE NOTE - NS ED NOTE AC HIGH RISK COUNTRIES
Called and LM for family, requesting call back with update. [facility], ph [phone], LM for Dearl Binder supervisor, requesting call back with update. Will continue to try and reach. Called 4/25-spoke to [address]. Goals      Prevent complications post hospitalization. 04/11/22   Diet- maintain consistent carb diet, eat 3 evenly spaced meals (4-5 hours apart) a day with snacks. Will avoid sugary snacks.  Staff advised BG this am was 122. CTN s/w Bill, patient's brother and he will contact facility because he had heard from other family member that patient had episode of hypoglycemia this am.   Staff at [facility] will monitor blood sugar frequency, insulin administration, endocrinologist f/up- family has chosen to switch endocrinologist to Dr. Cole Verdin on [email or web address] am.   [facility] Family will try to get BG log from facility and keep diet diary to share with endo at initial visit on 4/19. Timoteo plans to speak with DON and nutritionist to discuss concerns and request info. BEB    4/25/22  Sister,Richardson, says blood sugars continue to be up and down. Was 500 this weekend, endo was notified, orders obtained. Machelle Hunter eats food she shouldn't eat. Family monitoring what is in the apartment, brother cleared out refrig this weekend of high carb foods. She is not allowed to buy food in [facility] store. Dining room, Machelle Hunter can pick out her food, but will not be served dessert per family. Percy Mast had bought some Built Bars for her, low in carbs, but then she ate several, along with a Boost, and blood sugar shot up again. Family encouraged by her new endo, . Takes calls from [facility] when blood sugar is high or low and gives orders prn. Advised to monitor blood sugar results, share with endo. Encouraged to ask for meeting with dietician with [employer] to ensure DM appropriate food choices. CTN to check back in about a week, call sooner if concerns. clarissa No

## 2022-08-15 ENCOUNTER — INPATIENT (INPATIENT)
Facility: HOSPITAL | Age: 52
LOS: 5 days | Discharge: HOME | End: 2022-08-21
Attending: HOSPITALIST | Admitting: HOSPITALIST

## 2022-08-15 VITALS
TEMPERATURE: 98 F | HEIGHT: 62 IN | DIASTOLIC BLOOD PRESSURE: 95 MMHG | HEART RATE: 97 BPM | SYSTOLIC BLOOD PRESSURE: 149 MMHG | OXYGEN SATURATION: 95 % | RESPIRATION RATE: 15 BRPM

## 2022-08-15 DIAGNOSIS — Z87.828 PERSONAL HISTORY OF OTHER (HEALED) PHYSICAL INJURY AND TRAUMA: Chronic | ICD-10-CM

## 2022-08-15 DIAGNOSIS — F10.239 ALCOHOL DEPENDENCE WITH WITHDRAWAL, UNSPECIFIED: ICD-10-CM

## 2022-08-15 DIAGNOSIS — Z29.9 ENCOUNTER FOR PROPHYLACTIC MEASURES, UNSPECIFIED: ICD-10-CM

## 2022-08-15 DIAGNOSIS — R10.13 EPIGASTRIC PAIN: ICD-10-CM

## 2022-08-15 DIAGNOSIS — R20.0 ANESTHESIA OF SKIN: ICD-10-CM

## 2022-08-15 DIAGNOSIS — E78.5 HYPERLIPIDEMIA, UNSPECIFIED: ICD-10-CM

## 2022-08-15 DIAGNOSIS — E87.2 ACIDOSIS: ICD-10-CM

## 2022-08-15 DIAGNOSIS — R74.01 ELEVATION OF LEVELS OF LIVER TRANSAMINASE LEVELS: ICD-10-CM

## 2022-08-15 DIAGNOSIS — I10 ESSENTIAL (PRIMARY) HYPERTENSION: ICD-10-CM

## 2022-08-15 DIAGNOSIS — N40.0 BENIGN PROSTATIC HYPERPLASIA WITHOUT LOWER URINARY TRACT SYMPTOMS: ICD-10-CM

## 2022-08-15 DIAGNOSIS — E11.65 TYPE 2 DIABETES MELLITUS WITH HYPERGLYCEMIA: ICD-10-CM

## 2022-08-15 LAB
ALBUMIN SERPL ELPH-MCNC: 4.4 G/DL — SIGNIFICANT CHANGE UP (ref 3.3–5)
ALP SERPL-CCNC: 104 U/L — SIGNIFICANT CHANGE UP (ref 40–120)
ALT FLD-CCNC: 71 U/L — HIGH (ref 4–41)
ANION GAP SERPL CALC-SCNC: 16 MMOL/L — HIGH (ref 7–14)
ANION GAP SERPL CALC-SCNC: 24 MMOL/L — HIGH (ref 7–14)
APAP SERPL-MCNC: <10 UG/ML — LOW (ref 15–25)
APPEARANCE UR: CLEAR — SIGNIFICANT CHANGE UP
APTT BLD: 29.6 SEC — SIGNIFICANT CHANGE UP (ref 27–36.3)
AST SERPL-CCNC: 57 U/L — HIGH (ref 4–40)
B-OH-BUTYR SERPL-SCNC: 0.5 MMOL/L — HIGH (ref 0–0.4)
BASE EXCESS BLDV CALC-SCNC: -0.2 MMOL/L — SIGNIFICANT CHANGE UP (ref -2–3)
BASE EXCESS BLDV CALC-SCNC: -2.2 MMOL/L — LOW (ref -2–3)
BASE EXCESS BLDV CALC-SCNC: -2.8 MMOL/L — LOW (ref -2–3)
BASOPHILS # BLD AUTO: 0.04 K/UL — SIGNIFICANT CHANGE UP (ref 0–0.2)
BASOPHILS NFR BLD AUTO: 0.7 % — SIGNIFICANT CHANGE UP (ref 0–2)
BILIRUB SERPL-MCNC: 0.3 MG/DL — SIGNIFICANT CHANGE UP (ref 0.2–1.2)
BILIRUB UR-MCNC: NEGATIVE — SIGNIFICANT CHANGE UP
BLOOD GAS VENOUS COMPREHENSIVE RESULT: SIGNIFICANT CHANGE UP
BUN SERPL-MCNC: 10 MG/DL — SIGNIFICANT CHANGE UP (ref 7–23)
BUN SERPL-MCNC: 8 MG/DL — SIGNIFICANT CHANGE UP (ref 7–23)
CALCIUM SERPL-MCNC: 8.3 MG/DL — LOW (ref 8.4–10.5)
CALCIUM SERPL-MCNC: 8.6 MG/DL — SIGNIFICANT CHANGE UP (ref 8.4–10.5)
CHLORIDE BLDV-SCNC: 94 MMOL/L — LOW (ref 96–108)
CHLORIDE BLDV-SCNC: 95 MMOL/L — LOW (ref 96–108)
CHLORIDE BLDV-SCNC: 96 MMOL/L — SIGNIFICANT CHANGE UP (ref 96–108)
CHLORIDE SERPL-SCNC: 88 MMOL/L — LOW (ref 98–107)
CHLORIDE SERPL-SCNC: 90 MMOL/L — LOW (ref 98–107)
CO2 BLDV-SCNC: 23 MMOL/L — SIGNIFICANT CHANGE UP (ref 22–26)
CO2 BLDV-SCNC: 23.2 MMOL/L — SIGNIFICANT CHANGE UP (ref 22–26)
CO2 BLDV-SCNC: 23.5 MMOL/L — SIGNIFICANT CHANGE UP (ref 22–26)
CO2 SERPL-SCNC: 18 MMOL/L — LOW (ref 22–31)
CO2 SERPL-SCNC: 22 MMOL/L — SIGNIFICANT CHANGE UP (ref 22–31)
COLOR SPEC: SIGNIFICANT CHANGE UP
CREAT SERPL-MCNC: 0.46 MG/DL — LOW (ref 0.5–1.3)
CREAT SERPL-MCNC: 0.52 MG/DL — SIGNIFICANT CHANGE UP (ref 0.5–1.3)
DIFF PNL FLD: NEGATIVE — SIGNIFICANT CHANGE UP
EGFR: 121 ML/MIN/1.73M2 — SIGNIFICANT CHANGE UP
EGFR: 125 ML/MIN/1.73M2 — SIGNIFICANT CHANGE UP
EOSINOPHIL # BLD AUTO: 0.02 K/UL — SIGNIFICANT CHANGE UP (ref 0–0.5)
EOSINOPHIL NFR BLD AUTO: 0.3 % — SIGNIFICANT CHANGE UP (ref 0–6)
ETHANOL SERPL-MCNC: 115 MG/DL — HIGH
FLUAV AG NPH QL: SIGNIFICANT CHANGE UP
FLUBV AG NPH QL: SIGNIFICANT CHANGE UP
GAS PNL BLDV: 127 MMOL/L — LOW (ref 136–145)
GAS PNL BLDV: 128 MMOL/L — LOW (ref 136–145)
GAS PNL BLDV: 131 MMOL/L — LOW (ref 136–145)
GLUCOSE BLDV-MCNC: 234 MG/DL — HIGH (ref 70–99)
GLUCOSE BLDV-MCNC: 269 MG/DL — HIGH (ref 70–99)
GLUCOSE BLDV-MCNC: 306 MG/DL — HIGH (ref 70–99)
GLUCOSE SERPL-MCNC: 302 MG/DL — HIGH (ref 70–99)
GLUCOSE SERPL-MCNC: 376 MG/DL — HIGH (ref 70–99)
GLUCOSE UR QL: ABNORMAL
HCO3 BLDV-SCNC: 22 MMOL/L — SIGNIFICANT CHANGE UP (ref 22–29)
HCT VFR BLD CALC: 41.6 % — SIGNIFICANT CHANGE UP (ref 39–50)
HCT VFR BLDA CALC: 38 % — LOW (ref 39–51)
HCT VFR BLDA CALC: 40 % — SIGNIFICANT CHANGE UP (ref 39–51)
HCT VFR BLDA CALC: 44 % — SIGNIFICANT CHANGE UP (ref 39–51)
HGB BLD CALC-MCNC: 12.6 G/DL — LOW (ref 13–17)
HGB BLD CALC-MCNC: 13.3 G/DL — SIGNIFICANT CHANGE UP (ref 13–17)
HGB BLD CALC-MCNC: 14.7 G/DL — SIGNIFICANT CHANGE UP (ref 13–17)
HGB BLD-MCNC: 14.3 G/DL — SIGNIFICANT CHANGE UP (ref 13–17)
IANC: 3.18 K/UL — SIGNIFICANT CHANGE UP (ref 1.8–7.4)
IMM GRANULOCYTES NFR BLD AUTO: 1.6 % — HIGH (ref 0–1.5)
INR BLD: 0.95 RATIO — SIGNIFICANT CHANGE UP (ref 0.88–1.16)
KETONES UR-MCNC: ABNORMAL
LACTATE BLDV-MCNC: 5.8 MMOL/L — CRITICAL HIGH (ref 0.5–2)
LACTATE BLDV-MCNC: 5.9 MMOL/L — CRITICAL HIGH (ref 0.5–2)
LACTATE BLDV-MCNC: 6.5 MMOL/L — CRITICAL HIGH (ref 0.5–2)
LACTATE SERPL-SCNC: 1.9 MMOL/L — SIGNIFICANT CHANGE UP (ref 0.5–2)
LEUKOCYTE ESTERASE UR-ACNC: NEGATIVE — SIGNIFICANT CHANGE UP
LIDOCAIN IGE QN: 51 U/L — SIGNIFICANT CHANGE UP (ref 7–60)
LYMPHOCYTES # BLD AUTO: 2.07 K/UL — SIGNIFICANT CHANGE UP (ref 1–3.3)
LYMPHOCYTES # BLD AUTO: 36 % — SIGNIFICANT CHANGE UP (ref 13–44)
MAGNESIUM SERPL-MCNC: 2 MG/DL — SIGNIFICANT CHANGE UP (ref 1.6–2.6)
MCHC RBC-ENTMCNC: 30.1 PG — SIGNIFICANT CHANGE UP (ref 27–34)
MCHC RBC-ENTMCNC: 34.4 GM/DL — SIGNIFICANT CHANGE UP (ref 32–36)
MCV RBC AUTO: 87.6 FL — SIGNIFICANT CHANGE UP (ref 80–100)
MONOCYTES # BLD AUTO: 0.35 K/UL — SIGNIFICANT CHANGE UP (ref 0–0.9)
MONOCYTES NFR BLD AUTO: 6.1 % — SIGNIFICANT CHANGE UP (ref 2–14)
NEUTROPHILS # BLD AUTO: 3.18 K/UL — SIGNIFICANT CHANGE UP (ref 1.8–7.4)
NEUTROPHILS NFR BLD AUTO: 55.3 % — SIGNIFICANT CHANGE UP (ref 43–77)
NITRITE UR-MCNC: NEGATIVE — SIGNIFICANT CHANGE UP
NRBC # BLD: 0 /100 WBCS — SIGNIFICANT CHANGE UP
NRBC # FLD: 0 K/UL — SIGNIFICANT CHANGE UP
PCO2 BLDV: 30 MMHG — LOW (ref 42–55)
PCO2 BLDV: 37 MMHG — LOW (ref 42–55)
PCO2 BLDV: 37 MMHG — LOW (ref 42–55)
PH BLDV: 7.38 — SIGNIFICANT CHANGE UP (ref 7.32–7.43)
PH BLDV: 7.39 — SIGNIFICANT CHANGE UP (ref 7.32–7.43)
PH BLDV: 7.48 — HIGH (ref 7.32–7.43)
PH UR: 7.5 — SIGNIFICANT CHANGE UP (ref 5–8)
PLATELET # BLD AUTO: 145 K/UL — LOW (ref 150–400)
PO2 BLDV: 47 MMHG — SIGNIFICANT CHANGE UP
PO2 BLDV: 52 MMHG — SIGNIFICANT CHANGE UP
PO2 BLDV: 61 MMHG — SIGNIFICANT CHANGE UP
POTASSIUM BLDV-SCNC: 4 MMOL/L — SIGNIFICANT CHANGE UP (ref 3.5–5.1)
POTASSIUM BLDV-SCNC: 4.3 MMOL/L — SIGNIFICANT CHANGE UP (ref 3.5–5.1)
POTASSIUM BLDV-SCNC: 5 MMOL/L — SIGNIFICANT CHANGE UP (ref 3.5–5.1)
POTASSIUM SERPL-MCNC: 4 MMOL/L — SIGNIFICANT CHANGE UP (ref 3.5–5.3)
POTASSIUM SERPL-MCNC: 4.3 MMOL/L — SIGNIFICANT CHANGE UP (ref 3.5–5.3)
POTASSIUM SERPL-SCNC: 4 MMOL/L — SIGNIFICANT CHANGE UP (ref 3.5–5.3)
POTASSIUM SERPL-SCNC: 4.3 MMOL/L — SIGNIFICANT CHANGE UP (ref 3.5–5.3)
PROT SERPL-MCNC: 7.8 G/DL — SIGNIFICANT CHANGE UP (ref 6–8.3)
PROT UR-MCNC: NEGATIVE — SIGNIFICANT CHANGE UP
PROTHROM AB SERPL-ACNC: 11 SEC — SIGNIFICANT CHANGE UP (ref 10.5–13.4)
RBC # BLD: 4.75 M/UL — SIGNIFICANT CHANGE UP (ref 4.2–5.8)
RBC # FLD: 11.9 % — SIGNIFICANT CHANGE UP (ref 10.3–14.5)
RBC CASTS # UR COMP ASSIST: 1 /HPF — SIGNIFICANT CHANGE UP (ref 0–4)
RSV RNA NPH QL NAA+NON-PROBE: SIGNIFICANT CHANGE UP
SALICYLATES SERPL-MCNC: <0.3 MG/DL — LOW (ref 15–30)
SAO2 % BLDV: 80.5 % — SIGNIFICANT CHANGE UP
SAO2 % BLDV: 84.3 % — SIGNIFICANT CHANGE UP
SAO2 % BLDV: 92.5 % — SIGNIFICANT CHANGE UP
SARS-COV-2 RNA SPEC QL NAA+PROBE: SIGNIFICANT CHANGE UP
SODIUM SERPL-SCNC: 128 MMOL/L — LOW (ref 135–145)
SODIUM SERPL-SCNC: 130 MMOL/L — LOW (ref 135–145)
SP GR SPEC: 1.02 — SIGNIFICANT CHANGE UP (ref 1.01–1.05)
UROBILINOGEN FLD QL: SIGNIFICANT CHANGE UP
VIT B12 SERPL-MCNC: 533 PG/ML — SIGNIFICANT CHANGE UP (ref 200–900)
WBC # BLD: 5.75 K/UL — SIGNIFICANT CHANGE UP (ref 3.8–10.5)
WBC # FLD AUTO: 5.75 K/UL — SIGNIFICANT CHANGE UP (ref 3.8–10.5)
WBC UR QL: 1 /HPF — SIGNIFICANT CHANGE UP (ref 0–5)

## 2022-08-15 PROCEDURE — G1004: CPT

## 2022-08-15 PROCEDURE — 99285 EMERGENCY DEPT VISIT HI MDM: CPT

## 2022-08-15 PROCEDURE — 70450 CT HEAD/BRAIN W/O DYE: CPT | Mod: 26,MG

## 2022-08-15 PROCEDURE — 99223 1ST HOSP IP/OBS HIGH 75: CPT | Mod: GC

## 2022-08-15 RX ORDER — INSULIN GLARGINE 100 [IU]/ML
7 INJECTION, SOLUTION SUBCUTANEOUS AT BEDTIME
Refills: 0 | Status: DISCONTINUED | OUTPATIENT
Start: 2022-08-15 | End: 2022-08-15

## 2022-08-15 RX ORDER — DEXTROSE 50 % IN WATER 50 %
12.5 SYRINGE (ML) INTRAVENOUS ONCE
Refills: 0 | Status: DISCONTINUED | OUTPATIENT
Start: 2022-08-15 | End: 2022-08-16

## 2022-08-15 RX ORDER — INSULIN LISPRO 100/ML
VIAL (ML) SUBCUTANEOUS
Refills: 0 | Status: DISCONTINUED | OUTPATIENT
Start: 2022-08-15 | End: 2022-08-15

## 2022-08-15 RX ORDER — SODIUM CHLORIDE 9 MG/ML
1000 INJECTION, SOLUTION INTRAVENOUS
Refills: 0 | Status: DISCONTINUED | OUTPATIENT
Start: 2022-08-15 | End: 2022-08-16

## 2022-08-15 RX ORDER — INSULIN LISPRO 100/ML
VIAL (ML) SUBCUTANEOUS EVERY 6 HOURS
Refills: 0 | Status: DISCONTINUED | OUTPATIENT
Start: 2022-08-15 | End: 2022-08-16

## 2022-08-15 RX ORDER — DEXTROSE 50 % IN WATER 50 %
25 SYRINGE (ML) INTRAVENOUS ONCE
Refills: 0 | Status: DISCONTINUED | OUTPATIENT
Start: 2022-08-15 | End: 2022-08-15

## 2022-08-15 RX ORDER — POTASSIUM CHLORIDE 20 MEQ
20 PACKET (EA) ORAL ONCE
Refills: 0 | Status: DISCONTINUED | OUTPATIENT
Start: 2022-08-15 | End: 2022-08-15

## 2022-08-15 RX ORDER — GLUCAGON INJECTION, SOLUTION 0.5 MG/.1ML
1 INJECTION, SOLUTION SUBCUTANEOUS ONCE
Refills: 0 | Status: DISCONTINUED | OUTPATIENT
Start: 2022-08-15 | End: 2022-08-16

## 2022-08-15 RX ORDER — DEXTROSE 50 % IN WATER 50 %
15 SYRINGE (ML) INTRAVENOUS ONCE
Refills: 0 | Status: DISCONTINUED | OUTPATIENT
Start: 2022-08-15 | End: 2022-08-16

## 2022-08-15 RX ORDER — SODIUM CHLORIDE 9 MG/ML
1000 INJECTION, SOLUTION INTRAVENOUS ONCE
Refills: 0 | Status: COMPLETED | OUTPATIENT
Start: 2022-08-15 | End: 2022-08-15

## 2022-08-15 RX ORDER — FAMOTIDINE 10 MG/ML
1 INJECTION INTRAVENOUS
Qty: 0 | Refills: 0 | DISCHARGE

## 2022-08-15 RX ORDER — DEXTROSE 50 % IN WATER 50 %
12.5 SYRINGE (ML) INTRAVENOUS ONCE
Refills: 0 | Status: DISCONTINUED | OUTPATIENT
Start: 2022-08-15 | End: 2022-08-15

## 2022-08-15 RX ORDER — THIAMINE MONONITRATE (VIT B1) 100 MG
500 TABLET ORAL ONCE
Refills: 0 | Status: COMPLETED | OUTPATIENT
Start: 2022-08-15 | End: 2022-08-15

## 2022-08-15 RX ORDER — LISINOPRIL 2.5 MG/1
5 TABLET ORAL DAILY
Refills: 0 | Status: DISCONTINUED | OUTPATIENT
Start: 2022-08-15 | End: 2022-08-15

## 2022-08-15 RX ORDER — FAMOTIDINE 10 MG/ML
20 INJECTION INTRAVENOUS
Refills: 0 | Status: DISCONTINUED | OUTPATIENT
Start: 2022-08-15 | End: 2022-08-21

## 2022-08-15 RX ORDER — POTASSIUM CHLORIDE 20 MEQ
10 PACKET (EA) ORAL ONCE
Refills: 0 | Status: COMPLETED | OUTPATIENT
Start: 2022-08-15 | End: 2022-08-15

## 2022-08-15 RX ORDER — TAMSULOSIN HYDROCHLORIDE 0.4 MG/1
0.4 CAPSULE ORAL AT BEDTIME
Refills: 0 | Status: DISCONTINUED | OUTPATIENT
Start: 2022-08-15 | End: 2022-08-21

## 2022-08-15 RX ORDER — ENOXAPARIN SODIUM 100 MG/ML
40 INJECTION SUBCUTANEOUS EVERY 24 HOURS
Refills: 0 | Status: DISCONTINUED | OUTPATIENT
Start: 2022-08-15 | End: 2022-08-21

## 2022-08-15 RX ORDER — DEXTROSE 50 % IN WATER 50 %
15 SYRINGE (ML) INTRAVENOUS ONCE
Refills: 0 | Status: DISCONTINUED | OUTPATIENT
Start: 2022-08-15 | End: 2022-08-15

## 2022-08-15 RX ORDER — INSULIN LISPRO 100/ML
VIAL (ML) SUBCUTANEOUS AT BEDTIME
Refills: 0 | Status: DISCONTINUED | OUTPATIENT
Start: 2022-08-15 | End: 2022-08-15

## 2022-08-15 RX ORDER — THIAMINE MONONITRATE (VIT B1) 100 MG
500 TABLET ORAL DAILY
Refills: 0 | Status: COMPLETED | OUTPATIENT
Start: 2022-08-16 | End: 2022-08-18

## 2022-08-15 RX ORDER — SODIUM CHLORIDE 9 MG/ML
1000 INJECTION, SOLUTION INTRAVENOUS
Refills: 0 | Status: DISCONTINUED | OUTPATIENT
Start: 2022-08-15 | End: 2022-08-15

## 2022-08-15 RX ORDER — DEXTROSE 50 % IN WATER 50 %
25 SYRINGE (ML) INTRAVENOUS ONCE
Refills: 0 | Status: DISCONTINUED | OUTPATIENT
Start: 2022-08-15 | End: 2022-08-16

## 2022-08-15 RX ORDER — INSULIN GLARGINE 100 [IU]/ML
10 INJECTION, SOLUTION SUBCUTANEOUS
Refills: 0 | Status: DISCONTINUED | OUTPATIENT
Start: 2022-08-15 | End: 2022-08-16

## 2022-08-15 RX ORDER — TAMSULOSIN HYDROCHLORIDE 0.4 MG/1
1 CAPSULE ORAL
Qty: 0 | Refills: 0 | DISCHARGE

## 2022-08-15 RX ORDER — SODIUM CHLORIDE 9 MG/ML
1000 INJECTION INTRAMUSCULAR; INTRAVENOUS; SUBCUTANEOUS
Refills: 0 | Status: DISCONTINUED | OUTPATIENT
Start: 2022-08-15 | End: 2022-08-16

## 2022-08-15 RX ORDER — GLUCAGON INJECTION, SOLUTION 0.5 MG/.1ML
1 INJECTION, SOLUTION SUBCUTANEOUS ONCE
Refills: 0 | Status: DISCONTINUED | OUTPATIENT
Start: 2022-08-15 | End: 2022-08-15

## 2022-08-15 RX ORDER — LISINOPRIL 2.5 MG/1
5 TABLET ORAL DAILY
Refills: 0 | Status: DISCONTINUED | OUTPATIENT
Start: 2022-08-15 | End: 2022-08-21

## 2022-08-15 RX ADMIN — SODIUM CHLORIDE 75 MILLILITER(S): 9 INJECTION, SOLUTION INTRAVENOUS at 16:28

## 2022-08-15 RX ADMIN — SODIUM CHLORIDE 75 MILLILITER(S): 9 INJECTION INTRAMUSCULAR; INTRAVENOUS; SUBCUTANEOUS at 23:25

## 2022-08-15 RX ADMIN — Medication 1 TABLET(S): at 17:47

## 2022-08-15 RX ADMIN — SODIUM CHLORIDE 1000 MILLILITER(S): 9 INJECTION, SOLUTION INTRAVENOUS at 10:19

## 2022-08-15 RX ADMIN — INSULIN GLARGINE 10 UNIT(S): 100 INJECTION, SOLUTION SUBCUTANEOUS at 21:43

## 2022-08-15 RX ADMIN — Medication 3: at 18:10

## 2022-08-15 RX ADMIN — Medication 100 MILLIEQUIVALENT(S): at 22:50

## 2022-08-15 RX ADMIN — Medication 6: at 23:51

## 2022-08-15 RX ADMIN — Medication 105 MILLIGRAM(S): at 10:48

## 2022-08-15 RX ADMIN — TAMSULOSIN HYDROCHLORIDE 0.4 MILLIGRAM(S): 0.4 CAPSULE ORAL at 23:23

## 2022-08-15 RX ADMIN — Medication 50 MILLIGRAM(S): at 17:46

## 2022-08-15 RX ADMIN — ENOXAPARIN SODIUM 40 MILLIGRAM(S): 100 INJECTION SUBCUTANEOUS at 17:48

## 2022-08-15 RX ADMIN — FAMOTIDINE 20 MILLIGRAM(S): 10 INJECTION INTRAVENOUS at 17:47

## 2022-08-15 RX ADMIN — LISINOPRIL 5 MILLIGRAM(S): 2.5 TABLET ORAL at 17:47

## 2022-08-15 RX ADMIN — SODIUM CHLORIDE 1000 MILLILITER(S): 9 INJECTION, SOLUTION INTRAVENOUS at 10:47

## 2022-08-15 RX ADMIN — Medication 50 MILLIGRAM(S): at 13:14

## 2022-08-15 RX ADMIN — Medication 50 MILLIGRAM(S): at 10:18

## 2022-08-15 NOTE — H&P ADULT - NSICDXPASTMEDICALHX_GEN_ALL_CORE_FT
PAST MEDICAL HISTORY:  Alcohol withdrawal with inpatient treatment, uncomplicated     High blood pressure By pt report; does not use any medications as of today (3/7/18)    Hyperlipidemia

## 2022-08-15 NOTE — CONSULT NOTE ADULT - SUBJECTIVE AND OBJECTIVE BOX
Neurology - Consult Note    -  Spectra: [phone] ([facility]), [phone] ([facility])  -    HPI: Patient HARJINDER SLATER is a 51y (1970) man with a PMHx significant for alcohol use, DT's pancreatitis, DM, HTN, T2DM (previous A1C 12.9 in 2019), HLD presenting with full body tremors with slowed speech and left hand numbness that began last night at 23:00. Pt history limited due to acute intoxication. Pt stated that he woke up this morning and has isolated L arm numbness. pt denied sleeping in a different position than usual. In addition, pt stated that he felt non specific weakness compared to his baseline, tremors (subacute), NV, and room spinning. pt denied any HA or seizure like activity. PT stated that he drinks significantly with lat drink yesterday afternoon.     In ED Patient with elevated glucose 320, elevated serum EtOH, tachycardia to 100s but otherwise HD stable, he received 2L LR, and 2xchlordiazepoxide and IV thiamine.      Review of Systems:    CONSTITUTIONAL: No fevers or chills  EYES AND ENT: No visual changes or no throat pain   NECK: No pain or stiffness  RESPIRATORY: No hemoptysis or shortness of breath  CARDIOVASCULAR: No chest pain or palpitations  GASTROINTESTINAL: No melena or hematochezia  GENITOURINARY: No dysuria or hematuria  NEUROLOGICAL: +As stated in HPI above  SKIN: No itching, burning, rashes, or lesions   All other review of systems is negative unless indicated above.    Allergies:      PMHx/PSHx/Family Hx: As above, otherwise see below   High blood pressure    Hyperlipidemia    Alcohol withdrawal with inpatient treatment, uncomplicated        Social Hx:  No current use of tobacco, alcohol, or illicit drugs  Lives with ***    Medications:  MEDICATIONS  (STANDING):  chlordiazePOXIDE 50 milliGRAM(s) Oral every 6 hours  chlordiazePOXIDE   Oral   dextrose 5%. 1000 milliLiter(s) (50 mL/Hr) IV Continuous <Continuous>  dextrose 5%. 1000 milliLiter(s) (100 mL/Hr) IV Continuous <Continuous>  dextrose 50% Injectable 25 Gram(s) IV Push once  dextrose 50% Injectable 12.5 Gram(s) IV Push once  dextrose 50% Injectable 25 Gram(s) IV Push once  enoxaparin Injectable 40 milliGRAM(s) SubCutaneous every 24 hours  famotidine    Tablet 20 milliGRAM(s) Oral two times a day  glucagon  Injectable 1 milliGRAM(s) IntraMuscular once  insulin glargine Injectable (LANTUS) 7 Unit(s) SubCutaneous at bedtime  insulin lispro (ADMELOG) corrective regimen sliding scale   SubCutaneous three times a day before meals  insulin lispro (ADMELOG) corrective regimen sliding scale   SubCutaneous at bedtime  lactated ringers. 1000 milliLiter(s) (75 mL/Hr) IV Continuous <Continuous>  lisinopril 5 milliGRAM(s) Oral daily  multivitamin 1 Tablet(s) Oral daily  tamsulosin 0.4 milliGRAM(s) Oral at bedtime    MEDICATIONS  (PRN):  dextrose Oral Gel 15 Gram(s) Oral once PRN Blood Glucose LESS THAN 70 milliGRAM(s)/deciliter      Vitals:  T(C): 36.6 (08-15-22 @ 15:39), Max: 36.7 (08-15-22 @ 09:40)  HR: 86 (08-15-22 @ 15:39) (75 - 101)  BP: 141/85 (08-15-22 @ 15:39) (118/78 - 149/95)  RR: 16 (08-15-22 @ 15:39) (15 - 16)  SpO2: 99% (08-15-22 @ 15:39) (95% - 100%)    Physical Examination:    General - NAD  Cardiovascular - Peripheral pulses palpable, no edema  Eyes -  Fundoscopy not performed due to safety precautions in the setting of the COVID-19 pandemic    Neurologic Exam:  Mental status - Awake, Alert, Oriented to person, place, and time. Speech slightly dysarthric, repetition and naming intact. Follows simple commands. Attention/concentration limited and needs redirection frequently     Cranial nerves - PERRL, no obvious field defect, EOMI, face sensation (V1-V3) intact b/l, facial strength intact without asymmetry b/l, hearing intact b/l, palate with symmetric elevation, trapezius 5/5 strength b/l, tongue midline on protrusion with full lateral movement    Motor - Normal bulk and tone throughout. No obvious pronator drift.  Strength testing  RUE- 4/5 LUE 4-/5, RLE 5/5, LLE 5/5  exam limited due to pt effect and mental status      Sensation - decreased Light touch in the left UE from the distal elbow to wrist both dorsal and ventral, sparing the digits. No changes noted with pin prick throughout    DTR's -             Biceps      Triceps     Brachioradialis      Patellar    Ankle    Toes/plantar response  R             2+             2+                  2+                       2+            2+                 Down  L              2+             2+                 2+                        2+           2+                 Down    Coordination - no dysmetria on Finger to Nose, but has b/l UE action tremors appreciated. slight postural tremor present     Gait and station - differed     Labs:                        14.3   5.75  )-----------( 145      ( 15 Aug 2022 10:02 )             41.6     08-15    130<L>  |  88<L>  |  8   ----------------------------<  302<H>  4.3   |  18<L>  |  0.46<L>    Ca    8.6      15 Aug 2022 10:02  Mg     2.00     08-15    TPro  7.8  /  Alb  4.4  /  TBili  0.3  /  DBili  x   /  AST  57<H>  /  ALT  71<H>  /  AlkPhos  104  08-15    CAPILLARY BLOOD GLUCOSE      POCT Blood Glucose.: 277 mg/dL (15 Aug 2022 16:26)    LIVER FUNCTIONS - ( 15 Aug 2022 10:02 )  Alb: 4.4 g/dL / Pro: 7.8 g/dL / ALK PHOS: 104 U/L / ALT: 71 U/L / AST: 57 U/L / GGT: x             PT/INR - ( 15 Aug 2022 14:55 )   PT: 11.0 sec;   INR: 0.95 ratio         PTT - ( 15 Aug 2022 14:55 )  PTT:29.6 sec      Radiology:  CT Head No Cont:  (15 Aug 2022 10:17)    < from: CT Head No Cont (08.15.22 @ 10:17) >    IMPRESSION:    No evidence of acute infarct or hemorrhage.    --- End of Report ---    < end of copied text >   Neurology - Consult Note    -  Spectra: [phone] ([facility]), [phone] ([facility])  -    HPI: Patient HARJINDER SLATER is a 51y (1970) man with a PMHx significant for alcohol use, DT's pancreatitis, DM, HTN, T2DM (previous A1C 12.9 in 2019), HLD presenting with full body tremors with slowed speech and left hand numbness that began last night at 23:00. Pt history limited due to acute intoxication. Pt stated that he woke up this morning and has isolated L arm numbness. pt denied sleeping in a different position than usual. In addition, pt stated that he felt non specific weakness compared to his baseline, tremors (subacute), NV, and room spinning. pt denied any HA or seizure like activity. PT stated that he drinks significantly with lat drink yesterday afternoon.     In ED Patient with elevated glucose 320, elevated serum EtOH, tachycardia to 100s but otherwise HD stable, he received 2L LR, and 2xchlordiazepoxide and IV thiamine.      Review of Systems:    CONSTITUTIONAL: No fevers or chills  EYES AND ENT: No visual changes or no throat pain   NECK: No pain or stiffness  RESPIRATORY: No hemoptysis or shortness of breath  CARDIOVASCULAR: No chest pain or palpitations  GASTROINTESTINAL: No melena or hematochezia  GENITOURINARY: No dysuria or hematuria  NEUROLOGICAL: +As stated in HPI above  SKIN: No itching, burning, rashes, or lesions   All other review of systems is negative unless indicated above.    Allergies:      PMHx/PSHx/Family Hx: As above, otherwise see below   High blood pressure    Hyperlipidemia    Alcohol withdrawal with inpatient treatment, uncomplicated        Social Hx:  No current use of tobacco, alcohol, or illicit drugs  Lives with ***    Medications:  MEDICATIONS  (STANDING):  chlordiazePOXIDE 50 milliGRAM(s) Oral every 6 hours  chlordiazePOXIDE   Oral   dextrose 5%. 1000 milliLiter(s) (50 mL/Hr) IV Continuous <Continuous>  dextrose 5%. 1000 milliLiter(s) (100 mL/Hr) IV Continuous <Continuous>  dextrose 50% Injectable 25 Gram(s) IV Push once  dextrose 50% Injectable 12.5 Gram(s) IV Push once  dextrose 50% Injectable 25 Gram(s) IV Push once  enoxaparin Injectable 40 milliGRAM(s) SubCutaneous every 24 hours  famotidine    Tablet 20 milliGRAM(s) Oral two times a day  glucagon  Injectable 1 milliGRAM(s) IntraMuscular once  insulin glargine Injectable (LANTUS) 7 Unit(s) SubCutaneous at bedtime  insulin lispro (ADMELOG) corrective regimen sliding scale   SubCutaneous three times a day before meals  insulin lispro (ADMELOG) corrective regimen sliding scale   SubCutaneous at bedtime  lactated ringers. 1000 milliLiter(s) (75 mL/Hr) IV Continuous <Continuous>  lisinopril 5 milliGRAM(s) Oral daily  multivitamin 1 Tablet(s) Oral daily  tamsulosin 0.4 milliGRAM(s) Oral at bedtime    MEDICATIONS  (PRN):  dextrose Oral Gel 15 Gram(s) Oral once PRN Blood Glucose LESS THAN 70 milliGRAM(s)/deciliter      Vitals:  T(C): 36.6 (08-15-22 @ 15:39), Max: 36.7 (08-15-22 @ 09:40)  HR: 86 (08-15-22 @ 15:39) (75 - 101)  BP: 141/85 (08-15-22 @ 15:39) (118/78 - 149/95)  RR: 16 (08-15-22 @ 15:39) (15 - 16)  SpO2: 99% (08-15-22 @ 15:39) (95% - 100%)    Physical Examination:    General - NAD  Cardiovascular - Peripheral pulses palpable, no edema  Eyes -  Fundoscopy not performed due to safety precautions in the setting of the COVID-19 pandemic    Neurologic Exam:  Mental status - Awake, Alert, Oriented to person, place, and time. Speech slightly dysarthric, repetition and naming intact. Follows simple commands. Attention/concentration limited and needs redirection frequently     Cranial nerves - PERRL, no obvious field defect, EOMI, face sensation (V1-V3) intact b/l, facial strength intact without asymmetry b/l, hearing intact b/l, palate with symmetric elevation, trapezius 5/5 strength b/l, tongue midline on protrusion with full lateral movement    Motor - Normal bulk and tone throughout. No obvious pronator drift.  Strength testing  L wrist extension 4/5   L tricep 4+/5  otherwise 5/5 throughout   exam limited due to pt effect and mental status      Sensation - decreased Light touch in the left UE from the distal elbow to wrist both dorsal and ventral, sparing the digits. No changes noted with pin prick throughout    DTR's -             Biceps      Triceps     Brachioradialis      Patellar    Ankle    Toes/plantar response  R             2+             2+                  2+                       2+            2+                 Down  L              2+             2+                 2+                        2+           2+                 Down    Coordination - no dysmetria on Finger to Nose, but has b/l UE action tremors appreciated. slight postural tremor present     Gait and station - differed     Labs:                        14.3   5.75  )-----------( 145      ( 15 Aug 2022 10:02 )             41.6     08-15    130<L>  |  88<L>  |  8   ----------------------------<  302<H>  4.3   |  18<L>  |  0.46<L>    Ca    8.6      15 Aug 2022 10:02  Mg     2.00     08-15    TPro  7.8  /  Alb  4.4  /  TBili  0.3  /  DBili  x   /  AST  57<H>  /  ALT  71<H>  /  AlkPhos  104  08-15    CAPILLARY BLOOD GLUCOSE      POCT Blood Glucose.: 277 mg/dL (15 Aug 2022 16:26)    LIVER FUNCTIONS - ( 15 Aug 2022 10:02 )  Alb: 4.4 g/dL / Pro: 7.8 g/dL / ALK PHOS: 104 U/L / ALT: 71 U/L / AST: 57 U/L / GGT: x             PT/INR - ( 15 Aug 2022 14:55 )   PT: 11.0 sec;   INR: 0.95 ratio         PTT - ( 15 Aug 2022 14:55 )  PTT:29.6 sec      Radiology:  CT Head No Cont:  (15 Aug 2022 10:17)    < from: CT Head No Cont (08.15.22 @ 10:17) >    IMPRESSION:    No evidence of acute infarct or hemorrhage.    --- End of Report ---    < end of copied text >

## 2022-08-15 NOTE — ED PROVIDER NOTE - CLINICAL SUMMARY MEDICAL DECISION MAKING FREE TEXT BOX
51M hx of alcohol use, DT's pancreatitis, DM, HTN, HLD presenting with full body tremors with slowed speech and left hand numbness. Pt unlikely having a stroke  due to non focal deficits. r/o withdrawals. r/o electrolyte abnormalities.

## 2022-08-15 NOTE — H&P ADULT - NSHPREVIEWOFSYSTEMS_GEN_ALL_CORE
REVIEW OF SYSTEMS:    CONSTITUTIONAL:  No weakness, fevers or chills  EYES/ENT:  No visual changes;  No vertigo or throat pain   NECK:  + pain and stiffness  RESPIRATORY:  No cough, wheezing, hemoptysis; No shortness of breath  CARDIOVASCULAR:  No cardiac chest pain or palpitations  GASTROINTESTINAL:  +abdominal / epigastric pain. + nausea, vomiting, no hematemesis; No diarrhea or constipation. No melena or hematochezia.  GENITOURINARY:  + dysuria, frequency  NEUROLOGICAL:  +numbness and weakness  SKIN:  No itching, rashes  PSYCH: No AH/VH

## 2022-08-15 NOTE — H&P ADULT - PROBLEM SELECTOR PLAN 1
Patient endorsing 1pt/day rum and beer, previous hospitalizations for alcohol withdrawal with questionable DTs. Elevated EtOH in ED, started on CIWA initially at 9.     - Librium taper in setting of national lorazepam shortage  - daily multivitamin  - would benefit from alcohol cessation counseling (SBIRT)

## 2022-08-15 NOTE — H&P ADULT - ASSESSMENT
51M hx of alcohol use, previous admissions for DT's and pancreatitis, HTN, T2DM (previous A1C 12.9 in 2019 no longer on insulin), HLD presenting with full body tremors, slowed speech and left hand numbness that began 8/14 presenting with symptoms of alcohol withdrawal vs intoxication and hyperglycemia w/ lactatemia c/f DKA.  51M hx of alcohol use, previous admissions for DT's and pancreatitis, HTN, T2DM (previous A1C 12.9 in 2019 no longer on insulin), HLD presenting with full body tremors, slowed speech and left hand numbness that began 8/14 presenting with symptoms of alcohol withdrawal vs intoxication and hyperglycemia w/ lactatemia c/f progression of T2DM vs metformin lactic acidosis. Pending neuro consult  51M hx of alcohol use, previous admissions for DT's and pancreatitis, HTN, T2DM (previous A1C 12.9 in 2019 no longer on insulin), HLD presenting with full body tremors, slowed speech and left hand numbness that began 8/14 presenting with symptoms of alcohol withdrawal vs intoxication and hyperglycemia w/ lactatemia c/f progression of T2DM vs ?DKA. Pending neuro consult

## 2022-08-15 NOTE — H&P ADULT - PROBLEM SELECTOR PLAN 3
Patient with isolated distal LUE weakness and numbness, initially a code stroke, negative CT-head. Unlikely to be related to acute alcohol use or diabetic nephropathy as symptoms began acutely.     - neuro consult  - f/u Serum B12

## 2022-08-15 NOTE — STROKE CODE NOTE - DISPOSITION
Concern for withdrawal seizure. Not a consult for now, please call back [phone] for consult. Thank you./Other

## 2022-08-15 NOTE — H&P ADULT - NSHPLABSRESULTS_GEN_ALL_CORE
14.3   5.75  )-----------( 145      ( 15 Aug 2022 10:02 )             41.6   08-15    130<L>  |  88<L>  |  8   ----------------------------<  302<H>  4.3   |  18<L>  |  0.46<L>    Ca    8.6      15 Aug 2022 10:02  Mg     2.00     08-15    TPro  7.8  /  Alb  4.4  /  TBili  0.3  /  DBili  x   /  AST  57<H>  /  ALT  71<H>  /  AlkPhos  104  08-15    pH, Venous: 7.39   pCO2, Venous: 37 mmHg   pO2, Venous: 47 mmHg   HCO3, Venous: 22 mmol/L Blood Gas Venous - Lactate: 5.9: TYPE:(C=Critical, N=Notification, A=Abnormal) _ < from: CT Head No Cont (08.15.22 @ 10:17) >    IMPRESSION:    No evidence of acute infarct or hemorrhage.    < end of copied text >

## 2022-08-15 NOTE — ED ADULT TRIAGE NOTE - CHIEF COMPLAINT QUOTE
Pt arrives via EMS c/o left hand numbness since 23:00 last night. Pt is slow to respond and speech is slurred in amb bay, as per EMS, this is a new finding since on scene. A&Ox2-3. Pt endorses drinking daily, has not had a drink in 24 hrs, appears tremulous. Denies other PMH. Pt arrives via EMS c/o left hand numbness since 23:00 last night. Pt is slow to respond and speech is slurred in amb bay, as per EMS, this is a new finding since on scene. A&Ox2-3. Pt endorses drinking daily, has not had a drink in 24 hrs, appears tremulous. PMH: DM, HTN, HLD.

## 2022-08-15 NOTE — ED ADULT NURSE REASSESSMENT NOTE - NS ED NURSE REASSESS COMMENT FT1
Report and pt received from KEMAL Jin, pt A&Ox4, resting on stretcher. Respirations even and unlabored, sating 100% on RA, CIWA as per flowsheet. Pt offers no complaints at this time, well appearing. NAD noted. Vital signs stable. IV site patent. NAD noted. Pending bed assignment. bed in lowest position, side rails up, safety maintained.

## 2022-08-15 NOTE — ED PROVIDER NOTE - OBJECTIVE STATEMENT
51M hx of alcohol use, DT's pancreatitis, DM, HTN, HLD presenting with full body tremors with slowed speech and left hand numbness that began last night at 23:00. Pt has been drinking 1 pint alcohol everyday for the past week. pt unsure of any falls.  Last alcohol binge was in june. Last drink was yesterday afternoon. pt notes abdominal and chest pain after vomiting multiple times.  Pt was originally called in as a code stroke, but pt has no focal deficits and appears to be in alcohol withdrawals.

## 2022-08-15 NOTE — CONSULT NOTE ADULT - ASSESSMENT
51y (1970) man with a PMHx significant for alcohol use, DT's pancreatitis, DM, HTN, T2DM (previous A1C 12.9 in 2019), HLD presenting with full body tremors with slowed speech and left hand numbness that began last night at 23:00. Pt history limited due to acute intoxication. Found to have isolated L arm numbness. pt denied sleeping in a different position than usual. In addition, pt stated that he felt non specific weakness compared to his baseline, tremors (subacute), NV, and room spinning. pt denied any HA or seizure like activity. PT stated that he drinks significantly with lat drink yesterday afternoon.     Impression:  Left arm numbness likely 2/2 toxic metabolic etiology low suspicion for stroke due to non lateralizing exam. Other neurological symptoms including room spinning, nausea and tremors likely 2/2 acute on chronic alcohol intoxication    Recommendations:  C/w CIWA protocol  Will re-evaluate patient in AM when alcohol level is lower for more detailed exam.   no acute neurological intervention indication in the meanwhile.   Any acute change please do not hesitate to call.     Case discussed with Rosa     51y (1970) man with a PMHx significant for alcohol use, DT's pancreatitis, DM, HTN, T2DM (previous A1C 12.9 in 2019), HLD presenting with full body tremors with slowed speech and left hand numbness that began last night at 23:00. Pt history limited due to acute intoxication. Found to have isolated L arm numbness. pt denied sleeping in a different position than usual. In addition, pt stated that he felt non specific weakness compared to his baseline, tremors (subacute), NV, and room spinning. pt denied any HA or seizure like activity. PT stated that he drinks significantly with lat drink yesterday afternoon. L wrist extension 4/5   L tricep 4+/5    Impression:  Left arm numbness likely 2/2 toxic metabolic etiology low suspicion for stroke due to non lateralizing exam. Other neurological symptoms including room spinning, nausea and tremors likely 2/2 acute on chronic alcohol intoxication    Recommendations:  MRI brain w/o and MRA  C/w CIWA protocol, thiamine   replete electrolytes prn   Outpatient EMG if symptoms persist    Fall precaution  Any acute change please do not hesitate to call.     Case discussed and seen with Dr. Lee

## 2022-08-15 NOTE — ED ADULT NURSE NOTE - OBJECTIVE STATEMENT
Ari Rn: code stroke called for pt BIB EMS for slurred speech and b/l arm weakness, as per EMS this is a new finding since being on scene, 20 G to L hand placed by EMS. Pt A&Ox3, PMH of ETOH abuse, HLD, HTN, DM2, respirations are even and unlabored, sating at 100% on RA, 20 G to R forearm placed, labs sent. Pt appears tremulous with nausea and vomiting. CT being performed at this time. Ari Rn: code stroke called for pt BIB EMS for slurred speech and b/l arm weakness, as per EMS this is a new finding since being on scene, 20 G to L hand placed by EMS. Pt A&Ox3, PMH of ETOH abuse, HLD, HTN, DM2, DTS seizures, respirations are even and unlabored, sating at 100% on RA, 20 G to R forearm placed, labs sent. Pt appears tremulous with nausea and vomiting, has not had a drink in past 24 hours. CT being performed at this time.

## 2022-08-15 NOTE — H&P ADULT - PROBLEM SELECTOR PLAN 4
Patient with elevated lactate on admission in the 5s, received 2L LR in ED however remained high, unclear etiology for elevated lactate as is not consistent with pure alcohol withdrawal process.   Initially presented with alkalemia c/f a contraction alkalosis iso emesis at home, now w/ corrected pH and elevated lactate. Etiology unlikely to be related to shock or infection, c/f either post-seizure vs type B LA process.    - Repeat lactate at 1800 on 8/15  - LR at 75cc/hr Patient with elevated lactate on admission in the 5s, received 2L LR in ED however remained high, unclear etiology for elevated lactate as is not consistent with pure alcohol withdrawal process.   Initially presented with alkalemia c/f a contraction alkalosis iso emesis at home, now w/ corrected pH and elevated lactate. Etiology unlikely to be related to shock or infection, c/f either post-seizure vs type B LA process vs metformin use    - Repeat lactate at 1800 on 8/15  - LR at 75cc/hr

## 2022-08-15 NOTE — ED PROVIDER NOTE - PHYSICAL EXAMINATION
Const: Pt appears to be clinically intoxicated   Eyes: no conjunctival injection, and symmetrical lids.  HEENT: Head NCAT, no lesions. Atraumatic external nose and ears. Moist MM.  Neck: Symmetric, trachea midline.   CVS: +S1/S2   RESP: Unlabored respiratory effort.    GI: Nontender/Nondistended   MSK: Normocephalic/Atraumatic, Lower Extremities w/o calf tenderness or edema b/l.   Skin: Warm, dry and intact.   Neuro: CNs II-XII intact. Motor & Sensation intact. No pronator drift. Bilateral hand tremors.   Psych: Awake, Alert, & Oriented (AAO) x3. Appropriate mood and affect.

## 2022-08-15 NOTE — H&P ADULT - NSHPSOCIALHISTORY_GEN_ALL_CORE
Drinks 1pt/day of rum and beer  Abdullahi since age of 12  No other drug use    Born in [facility], moved here 2013

## 2022-08-15 NOTE — ED PROVIDER NOTE - ATTENDING CONTRIBUTION TO CARE
Dr. Lopez, Attending Physician-  I performed a face to face bedside interview with patient regarding history of present illness, review of symptoms and past medical history. I completed an independent physical exam.  I have discussed patient's plan of care with the resident.    48M, HTN, HLD, ETOH disorder (hx of tremors, ?seizures, no intubations) who presents with tremors. Wife at bedside notes that he has been drinking heavily for the past 7 days. Stopped for the past 24 hours. Presented as a stroke code for "altered mental status and weakness" however patient without focal neurologic deficits - just latency of speech and tremulousness. No headaches, fevers, chills, cough, sputum, cp, sob, abdominal pain, nvd, dysuria, hematuria, recent travel, trauma, syncope. Physical: atraumatic, dry mucous membranes, mildly tachy, tongue tremors, CTABL, abdomen soft, HAMM spontaneously, upper extremity tremors when outstretched. Plan: likely ETOH withdrawal - will draw labs, and provide thiamine/IVF/librium.

## 2022-08-15 NOTE — CONSULT NOTE ADULT - ATTENDING COMMENTS
Bilateral postural and intention tremor, worse on right, and mild left weakness in wrist extension and triceps. I suspect the tremor relates to alcohol use, and the weakness is more likely peripheral. Nevertheless, it is new, would get MRI to rule out CVA. If unremarkable, will need outpatient EMG/NCS. CIWA scale and social work, as he expressed the desire to drink less.

## 2022-08-15 NOTE — H&P ADULT - PROBLEM SELECTOR PLAN 2
Patient with marked hyperglycemia, had been on insulin previously for 3 months in 2019. Currently endorsing emesis, nausea, and lack of appetite, however not consistent with DKA.  Home glipizide and metformin held while inpatient     - f/u BHB, A1C  - LR mIVF at 75cc/hr   - NISS and basal insulin of 7u lantus Patient with marked hyperglycemia, had been on insulin previously for 3 months in 2019. Currently endorsing emesis, nausea, and lack of appetite, however not consistent with DKA and may represent progression of chronic diabetes.    Home glipizide and metformin held while inpatient     - f/u BHB, A1C  - LR mIVF at 75cc/hr   - NISS and basal insulin of 7u lantus

## 2022-08-15 NOTE — H&P ADULT - NSHPPHYSICALEXAM_GEN_ALL_CORE
Vital Signs Last 24 Hrs  T(C): 36.4 (15 Aug 2022 14:46), Max: 36.7 (15 Aug 2022 09:40)  T(F): 97.5 (15 Aug 2022 14:46), Max: 98.1 (15 Aug 2022 09:40)  HR: 75 (15 Aug 2022 14:46) (75 - 101)  BP: 118/78 (15 Aug 2022 14:46) (118/78 - 149/95)  BP(mean): --  RR: 16 (15 Aug 2022 14:46) (15 - 16)  SpO2: 100% (15 Aug 2022 14:46) (95% - 100%)    Parameters below as of 15 Aug 2022 14:46  Patient On (Oxygen Delivery Method): room air    General: WN/WD NAD, seen laying in bed slightly uncomfortable  Neurology: A&Ox3, nonfocal, HAMM x 4  Eyes: PERRLA/ EOMI, Gross vision intact  ENT/Neck: Neck supple, trachea midline, No JVD, Gross hearing intact  Respiratory: CTA B/L, No wheezing, rales, rhonchi  CV: RRR, +S1/S2, -S3/S4, no murmurs, rubs or gallops  Abdominal: mildly frm, Tender in upper abdomen, Distended +BS, HSM unable to be appreciated  MSK: 5/5 strength RUE + LE bilaterally LUE w/ prominent weakness of distal digits 3-4/5  Extremities: No edema, 2+ peripheral pulses  Skin: No Rashes, Hematoma, Ecchymosis  Incisions:   Tubes:

## 2022-08-15 NOTE — H&P ADULT - ATTENDING COMMENTS
alcohol withdrawal with lactic acidosis in setting of uncontrolled DM2 w hyperglycemia  at this time, elevated lactate is likely attributable to alcohol - r/o DKA, awaiting BHB  CIWA protocol and trend lactate w hydration  no obvious s/s of infection or ischemia, if sz is precipitating lactate, in setting of ETOH w/d treatment is detox   lipase normal, mild transaminitis likely mild alcoholic hepatitis, trend LFTs  previously on 11 units of Lantus w 5 TID premeal, per family hasn't been on insulin now for years - FS here in 300s - will start Lantus 7 units and NISS and await A1C and further FS trend to adjust   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

## 2022-08-15 NOTE — H&P ADULT - HISTORY OF PRESENT ILLNESS
51M hx of alcohol use, DT's pancreatitis, DM, HTN, T2DM (previous A1C 12.9 in 2019), HLD presenting with full body tremors with slowed speech and left hand numbness that began last night at 23:00. Patient states since numbness began at 23:00 on 8/14 it has neither improved nor gotten worse, states feels weak in general. Has had numerous falls at home with most recent fall being a few months ago. He is also endorsing new body tremors, but denies AH/VH. He drinks one pint of rum per day and was previously sober between June 2022 and 8/6/22 when he relapsed. He endorses a last drink of 1 pt rum and beer 8/14 afternoon. He endorses prior hospitalizations for alcohol withdrawal however has never required intubation. Of note, States over the last two days he has also had numerous episodes of NB/NB emesis, abdominal pain, nausea, and polyuria/mild dysuria. He had previously been diagnosed with T2DM in 2019 and was started on insulin at that time. He took insulin for only a few months before transitioning to metformin. He has not used insulin since.    In ED Patient with elevated glucose 320, elevated serum EtOH, tachycardia to 100s but otherwise HD stable, he received 2L LR, and 2xchlordiazepoxide and IV thiamine.

## 2022-08-16 LAB
ALBUMIN SERPL ELPH-MCNC: 3.7 G/DL — SIGNIFICANT CHANGE UP (ref 3.3–5)
ALP SERPL-CCNC: 69 U/L — SIGNIFICANT CHANGE UP (ref 40–120)
ALT FLD-CCNC: 50 U/L — HIGH (ref 4–41)
ANION GAP SERPL CALC-SCNC: 12 MMOL/L — SIGNIFICANT CHANGE UP (ref 7–14)
AST SERPL-CCNC: 29 U/L — SIGNIFICANT CHANGE UP (ref 4–40)
BILIRUB SERPL-MCNC: 0.6 MG/DL — SIGNIFICANT CHANGE UP (ref 0.2–1.2)
BUN SERPL-MCNC: 9 MG/DL — SIGNIFICANT CHANGE UP (ref 7–23)
CALCIUM SERPL-MCNC: 8.3 MG/DL — LOW (ref 8.4–10.5)
CHLORIDE SERPL-SCNC: 96 MMOL/L — LOW (ref 98–107)
CO2 SERPL-SCNC: 23 MMOL/L — SIGNIFICANT CHANGE UP (ref 22–31)
CREAT SERPL-MCNC: 0.47 MG/DL — LOW (ref 0.5–1.3)
EGFR: 124 ML/MIN/1.73M2 — SIGNIFICANT CHANGE UP
GLUCOSE SERPL-MCNC: 175 MG/DL — HIGH (ref 70–99)
HCT VFR BLD CALC: 39.9 % — SIGNIFICANT CHANGE UP (ref 39–50)
HGB BLD-MCNC: 13.8 G/DL — SIGNIFICANT CHANGE UP (ref 13–17)
LACTATE SERPL-SCNC: 1.4 MMOL/L — SIGNIFICANT CHANGE UP (ref 0.5–2)
MAGNESIUM SERPL-MCNC: 2 MG/DL — SIGNIFICANT CHANGE UP (ref 1.6–2.6)
MCHC RBC-ENTMCNC: 30.3 PG — SIGNIFICANT CHANGE UP (ref 27–34)
MCHC RBC-ENTMCNC: 34.6 GM/DL — SIGNIFICANT CHANGE UP (ref 32–36)
MCV RBC AUTO: 87.7 FL — SIGNIFICANT CHANGE UP (ref 80–100)
NRBC # BLD: 0 /100 WBCS — SIGNIFICANT CHANGE UP
NRBC # FLD: 0 K/UL — SIGNIFICANT CHANGE UP
PHOSPHATE SERPL-MCNC: 2.8 MG/DL — SIGNIFICANT CHANGE UP (ref 2.5–4.5)
PLATELET # BLD AUTO: 127 K/UL — LOW (ref 150–400)
POTASSIUM SERPL-MCNC: 3.5 MMOL/L — SIGNIFICANT CHANGE UP (ref 3.5–5.3)
POTASSIUM SERPL-SCNC: 3.5 MMOL/L — SIGNIFICANT CHANGE UP (ref 3.5–5.3)
PROT SERPL-MCNC: 6.3 G/DL — SIGNIFICANT CHANGE UP (ref 6–8.3)
RBC # BLD: 4.55 M/UL — SIGNIFICANT CHANGE UP (ref 4.2–5.8)
RBC # FLD: 11.9 % — SIGNIFICANT CHANGE UP (ref 10.3–14.5)
SODIUM SERPL-SCNC: 131 MMOL/L — LOW (ref 135–145)
WBC # BLD: 6.62 K/UL — SIGNIFICANT CHANGE UP (ref 3.8–10.5)
WBC # FLD AUTO: 6.62 K/UL — SIGNIFICANT CHANGE UP (ref 3.8–10.5)

## 2022-08-16 PROCEDURE — 99233 SBSQ HOSP IP/OBS HIGH 50: CPT | Mod: GC

## 2022-08-16 PROCEDURE — 99223 1ST HOSP IP/OBS HIGH 75: CPT

## 2022-08-16 RX ORDER — SODIUM CHLORIDE 9 MG/ML
1000 INJECTION, SOLUTION INTRAVENOUS
Refills: 0 | Status: DISCONTINUED | OUTPATIENT
Start: 2022-08-16 | End: 2022-08-21

## 2022-08-16 RX ORDER — INSULIN LISPRO 100/ML
VIAL (ML) SUBCUTANEOUS
Refills: 0 | Status: DISCONTINUED | OUTPATIENT
Start: 2022-08-16 | End: 2022-08-21

## 2022-08-16 RX ORDER — DEXTROSE 50 % IN WATER 50 %
25 SYRINGE (ML) INTRAVENOUS ONCE
Refills: 0 | Status: DISCONTINUED | OUTPATIENT
Start: 2022-08-16 | End: 2022-08-16

## 2022-08-16 RX ORDER — ISOPROPYL ALCOHOL, BENZOCAINE .7; .06 ML/ML; ML/ML
1 SWAB TOPICAL
Qty: 100 | Refills: 1
Start: 2022-08-16 | End: 2022-10-04

## 2022-08-16 RX ORDER — INSULIN LISPRO 100/ML
VIAL (ML) SUBCUTANEOUS
Refills: 0 | Status: DISCONTINUED | OUTPATIENT
Start: 2022-08-16 | End: 2022-08-16

## 2022-08-16 RX ORDER — GLUCAGON INJECTION, SOLUTION 0.5 MG/.1ML
1 INJECTION, SOLUTION SUBCUTANEOUS ONCE
Refills: 0 | Status: DISCONTINUED | OUTPATIENT
Start: 2022-08-16 | End: 2022-08-16

## 2022-08-16 RX ORDER — DEXTROSE 50 % IN WATER 50 %
12.5 SYRINGE (ML) INTRAVENOUS ONCE
Refills: 0 | Status: DISCONTINUED | OUTPATIENT
Start: 2022-08-16 | End: 2022-08-21

## 2022-08-16 RX ORDER — LANOLIN ALCOHOL/MO/W.PET/CERES
6 CREAM (GRAM) TOPICAL AT BEDTIME
Refills: 0 | Status: DISCONTINUED | OUTPATIENT
Start: 2022-08-16 | End: 2022-08-21

## 2022-08-16 RX ORDER — INSULIN LISPRO 100/ML
VIAL (ML) SUBCUTANEOUS AT BEDTIME
Refills: 0 | Status: DISCONTINUED | OUTPATIENT
Start: 2022-08-16 | End: 2022-08-21

## 2022-08-16 RX ORDER — GLUCAGON INJECTION, SOLUTION 0.5 MG/.1ML
1 INJECTION, SOLUTION SUBCUTANEOUS ONCE
Refills: 0 | Status: DISCONTINUED | OUTPATIENT
Start: 2022-08-16 | End: 2022-08-21

## 2022-08-16 RX ORDER — INSULIN LISPRO 100/ML
5 VIAL (ML) SUBCUTANEOUS
Refills: 0 | Status: DISCONTINUED | OUTPATIENT
Start: 2022-08-16 | End: 2022-08-17

## 2022-08-16 RX ORDER — DEXTROSE 50 % IN WATER 50 %
12.5 SYRINGE (ML) INTRAVENOUS ONCE
Refills: 0 | Status: DISCONTINUED | OUTPATIENT
Start: 2022-08-16 | End: 2022-08-16

## 2022-08-16 RX ORDER — SODIUM CHLORIDE 9 MG/ML
1000 INJECTION, SOLUTION INTRAVENOUS
Refills: 0 | Status: DISCONTINUED | OUTPATIENT
Start: 2022-08-16 | End: 2022-08-16

## 2022-08-16 RX ORDER — DEXTROSE 50 % IN WATER 50 %
15 SYRINGE (ML) INTRAVENOUS ONCE
Refills: 0 | Status: DISCONTINUED | OUTPATIENT
Start: 2022-08-16 | End: 2022-08-21

## 2022-08-16 RX ORDER — DEXTROSE 50 % IN WATER 50 %
25 SYRINGE (ML) INTRAVENOUS ONCE
Refills: 0 | Status: DISCONTINUED | OUTPATIENT
Start: 2022-08-16 | End: 2022-08-21

## 2022-08-16 RX ORDER — DEXTROSE 50 % IN WATER 50 %
15 SYRINGE (ML) INTRAVENOUS ONCE
Refills: 0 | Status: DISCONTINUED | OUTPATIENT
Start: 2022-08-16 | End: 2022-08-16

## 2022-08-16 RX ORDER — INSULIN GLARGINE 100 [IU]/ML
11 INJECTION, SOLUTION SUBCUTANEOUS AT BEDTIME
Refills: 0 | Status: DISCONTINUED | OUTPATIENT
Start: 2022-08-16 | End: 2022-08-17

## 2022-08-16 RX ORDER — INSULIN LISPRO 100/ML
1 VIAL (ML) SUBCUTANEOUS ONCE
Refills: 0 | Status: COMPLETED | OUTPATIENT
Start: 2022-08-16 | End: 2022-08-16

## 2022-08-16 RX ORDER — INSULIN LISPRO 100/ML
VIAL (ML) SUBCUTANEOUS AT BEDTIME
Refills: 0 | Status: DISCONTINUED | OUTPATIENT
Start: 2022-08-16 | End: 2022-08-16

## 2022-08-16 RX ADMIN — Medication 4: at 21:43

## 2022-08-16 RX ADMIN — FAMOTIDINE 20 MILLIGRAM(S): 10 INJECTION INTRAVENOUS at 18:11

## 2022-08-16 RX ADMIN — Medication 5 UNIT(S): at 12:24

## 2022-08-16 RX ADMIN — LISINOPRIL 5 MILLIGRAM(S): 2.5 TABLET ORAL at 05:09

## 2022-08-16 RX ADMIN — TAMSULOSIN HYDROCHLORIDE 0.4 MILLIGRAM(S): 0.4 CAPSULE ORAL at 21:43

## 2022-08-16 RX ADMIN — ENOXAPARIN SODIUM 40 MILLIGRAM(S): 100 INJECTION SUBCUTANEOUS at 18:11

## 2022-08-16 RX ADMIN — Medication 1 UNIT(S): at 09:22

## 2022-08-16 RX ADMIN — Medication 50 MILLIGRAM(S): at 05:08

## 2022-08-16 RX ADMIN — Medication 50 MILLIGRAM(S): at 11:53

## 2022-08-16 RX ADMIN — Medication 1 TABLET(S): at 11:53

## 2022-08-16 RX ADMIN — Medication 6: at 18:11

## 2022-08-16 RX ADMIN — Medication 50 MILLIGRAM(S): at 00:39

## 2022-08-16 RX ADMIN — Medication 50 MILLIGRAM(S): at 18:11

## 2022-08-16 RX ADMIN — FAMOTIDINE 20 MILLIGRAM(S): 10 INJECTION INTRAVENOUS at 05:08

## 2022-08-16 RX ADMIN — Medication 6 MILLIGRAM(S): at 21:43

## 2022-08-16 RX ADMIN — Medication 6: at 12:24

## 2022-08-16 RX ADMIN — Medication 105 MILLIGRAM(S): at 12:23

## 2022-08-16 RX ADMIN — SODIUM CHLORIDE 75 MILLILITER(S): 9 INJECTION INTRAMUSCULAR; INTRAVENOUS; SUBCUTANEOUS at 09:22

## 2022-08-16 RX ADMIN — Medication 0.5 MILLIGRAM(S): at 23:52

## 2022-08-16 RX ADMIN — INSULIN GLARGINE 11 UNIT(S): 100 INJECTION, SOLUTION SUBCUTANEOUS at 21:43

## 2022-08-16 RX ADMIN — Medication 100 MILLIEQUIVALENT(S): at 00:40

## 2022-08-16 RX ADMIN — Medication 5 UNIT(S): at 18:11

## 2022-08-16 RX ADMIN — Medication 4: at 05:09

## 2022-08-16 NOTE — CONSULT NOTE ADULT - ASSESSMENT
51M hx of alcohol use, DT's pancreatitis, DM, HTN, T2DM (previous A1C 12.9 in 2019), HLD presenting with full body tremors with slowed speech and left hand numbness        endocrine called for DM assistance   a1c 8.2    labs notable for mild BHOB 8/15  no AG or decreased bicarb on labs today    1.DM  on metformin at home   also states another DM agent he takes but does not know the name  pt with hx of pancreatitis in past   heavy etoh use  inpt plan    While inpatient  BG target 100-180 mg/dl, remains above goal  - Lantus   11 units qhs  - pt needs premeal insulin - started on Admelog to 5 units SC Premeal/TIDAC   - Admelog  Low dose Correction Scale Premeal & seperate low dose  Correction Scale Bedtime   - Hypoglycemia protocol in place   - Carb Consistent Diet   - Nutrition consult   - Provider to RN for diabetes/insulin pen teaching         dc planning   stop metformin given ETOH use   will need dc on basal bolus vs 70/30 (2019 was  dced on 70/30 and states he no longer takes it)  also reports another DM med at home (but does not know name)  please do current med rec (med recc at admission notes insulin which he does not take)  Please also send prescriptions for new glucometer per insurance, test strips, lancets, BD ragini needles, alcohol pads and glucose tabs to the pts pharamcy prior to DC.  please send glucometer and supplies to [facility] and "ask for delivery to bedside" so that pt can be taught the glucometer being perscribed.  will need appts outpt, pcp, endocrine, opthomology       2. HTN  goal BP <130/80  outpt mc/cr ratio      3. HLD  lipid panel   consider statin if no contratincations             Halie Sandhu MD  Attending Physician   Department of Endocrinology, Diabetes and Metabolism     Pager  [phone] [please provide 10 digit call back number]  NICOLA 9-5  [email or web address]  Nights and weekends: [phone]  Please note that this patient may be followed by a different provider tomorrow.   If no answer or after hours, please contact [phone].  For final dc reccomendations, please call [phone] or page the endocrine fellow on call.

## 2022-08-16 NOTE — PROGRESS NOTE ADULT - SUBJECTIVE AND OBJECTIVE BOX
INCOMPLETE NOTE    Juliocesra Schultz | PGY1| Pager: [phone]  Interval Events:    REVIEW OF SYSTEMS:  CONSTITUTIONAL: No weakness, fevers or chills  EYES/ENT: No visual changes;  No vertigo or throat pain   NECK: No pain or stiffness  RESPIRATORY: No cough, wheezing, hemoptysis; No shortness of breath  CARDIOVASCULAR: No chest pain or palpitations  GASTROINTESTINAL: No abdominal or epigastric pain. No nausea, vomiting, or hematemesis; No diarrhea or constipation. No melena or hematochezia.  GENITOURINARY: No dysuria, frequency or hematuria  NEUROLOGICAL: No numbness or weakness  SKIN: No itching, burning, rashes, or lesions   All other review of systems is negative unless indicated above.    OBJECTIVE:  ICU Vital Signs Last 24 Hrs  T(C): 36.4 (16 Aug 2022 06:45), Max: 37.1 (16 Aug 2022 00:28)  T(F): 97.5 (16 Aug 2022 06:45), Max: 98.8 (16 Aug 2022 00:28)  HR: 89 (16 Aug 2022 06:45) (75 - 101)  BP: 107/78 (16 Aug 2022 06:45) (107/78 - 149/95)  BP(mean): --  ABP: --  ABP(mean): --  RR: 17 (16 Aug 2022 06:45) (15 - 18)  SpO2: 100% (16 Aug 2022 06:45) (95% - 100%)    O2 Parameters below as of 16 Aug 2022 06:45  Patient On (Oxygen Delivery Method): room air              CAPILLARY BLOOD GLUCOSE      POCT Blood Glucose.: 209 mg/dL (16 Aug 2022 04:58)      PHYSICAL EXAM:  General: WN/WD NAD  Neurology: A&Ox3, nonfocal, HAMM x 4  Eyes: PERRLA/ EOMI, Gross vision intact  ENT/Neck: Neck supple, trachea midline, No JVD, Gross hearing intact  Respiratory: CTA B/L, No wheezing, rales, rhonchi  CV: RRR, +S1/S2, -S3/S4, no murmurs, rubs or gallops  Abdominal: Soft, NT, ND +BS, No HSM  MSK: 5/5 strength UE/LE bilaterally  Extremities: No edema, 2+ peripheral pulses  Skin: No Rashes, Hematoma, Ecchymosis  Incisions:   Tubes:    HOSPITAL MEDICATIONS:  MEDICATIONS  (STANDING):  chlordiazePOXIDE 50 milliGRAM(s) Oral every 6 hours  chlordiazePOXIDE 50 milliGRAM(s) Oral every 8 hours  chlordiazePOXIDE   Oral   dextrose 5%. 1000 milliLiter(s) (50 mL/Hr) IV Continuous <Continuous>  dextrose 5%. 1000 milliLiter(s) (100 mL/Hr) IV Continuous <Continuous>  dextrose 50% Injectable 25 Gram(s) IV Push once  dextrose 50% Injectable 12.5 Gram(s) IV Push once  dextrose 50% Injectable 25 Gram(s) IV Push once  enoxaparin Injectable 40 milliGRAM(s) SubCutaneous every 24 hours  famotidine    Tablet 20 milliGRAM(s) Oral two times a day  glucagon  Injectable 1 milliGRAM(s) IntraMuscular once  insulin glargine Injectable (LANTUS) 10 Unit(s) SubCutaneous <User Schedule>  insulin lispro (ADMELOG) corrective regimen sliding scale   SubCutaneous every 6 hours  lisinopril 5 milliGRAM(s) Oral daily  multivitamin 1 Tablet(s) Oral daily  sodium chloride 0.9%. 1000 milliLiter(s) (75 mL/Hr) IV Continuous <Continuous>  tamsulosin 0.4 milliGRAM(s) Oral at bedtime  thiamine IVPB 500 milliGRAM(s) IV Intermittent daily    MEDICATIONS  (PRN):  dextrose Oral Gel 15 Gram(s) Oral once PRN Blood Glucose LESS THAN 70 milliGRAM(s)/deciliter      LABS:                        14.3   5.75  )-----------( 145      ( 15 Aug 2022 10:02 )             41.6     Hgb Trend: 14.3<--  08-15    128<L>  |  90<L>  |  10  ----------------------------<  376<H>  4.0   |  22  |  0.52    Ca    8.3<L>      15 Aug 2022 19:06  Mg     2.00     08-15    TPro  7.8  /  Alb  4.4  /  TBili  0.3  /  DBili  x   /  AST  57<H>  /  ALT  71<H>  /  AlkPhos  104  08-15    Creatinine Trend: 0.52<--, 0.46<--  PT/INR - ( 15 Aug 2022 14:55 )   PT: 11.0 sec;   INR: 0.95 ratio         PTT - ( 15 Aug 2022 14:55 )  PTT:29.6 sec  Urinalysis Basic - ( 15 Aug 2022 19:44 )    Color: Light Yellow / Appearance: Clear / S.017 / pH: x  Gluc: x / Ketone: Trace  / Bili: Negative / Urobili: <2 mg/dL   Blood: x / Protein: Negative / Nitrite: Negative   Leuk Esterase: Negative / RBC: 1 /HPF / WBC 1 /HPF   Sq Epi: x / Non Sq Epi: x / Bacteria: x        Venous Blood Gas:  08-15 @ 12:34  7.39/37/47/22/80.5  VBG Lactate: 5.9  Venous Blood Gas:  08-15 @ 11:30  7.38/37/52/22/84.3  VBG Lactate: 6.5  Venous Blood Gas:  08-15 @ 10:02  7.48/30/61/22/92.5  VBG Lactate: 5.8      MICROBIOLOGY:      Juliocesar Marion | PGY1| Pager: [phone]  Interval Events: O/N blood glucose 392, lantus changed from 7-10, made NPO, BHB .5, received 3,6,4u sliding scale, fluids changed to NS at same rate, CIWA 4/5. Glucose now 189, resolution of LUE weakness, still nauseous, difficulty walking this AM to bathroom. Making BMs and urine. No AH/VH, +iveNausea negative for V/C/D, SOB, CP, F/C    All other review of systems is negative unless indicated above.    OBJECTIVE:  ICU Vital Signs Last 24 Hrs  T(C): 36.4 (16 Aug 2022 06:45), Max: 37.1 (16 Aug 2022 00:28)  T(F): 97.5 (16 Aug 2022 06:45), Max: 98.8 (16 Aug 2022 00:28)  HR: 89 (16 Aug 2022 06:45) (75 - 101)  BP: 107/78 (16 Aug 2022 06:45) (107/78 - 149/95)  BP(mean): --  ABP: --  ABP(mean): --  RR: 17 (16 Aug 2022 06:45) (15 - 18)  SpO2: 100% (16 Aug 2022 06:45) (95% - 100%)    O2 Parameters below as of 16 Aug 2022 06:45  Patient On (Oxygen Delivery Method): room air              CAPILLARY BLOOD GLUCOSE      POCT Blood Glucose.: 209 mg/dL (16 Aug 2022 04:58)      PHYSICAL EXAM:  General: WN/WD NAD  Neurology: A&Ox3, nonfocal, HAMM x 4  Eyes: PERRLA/ EOMI, Gross vision intact  ENT/Neck: Neck supple, trachea midline, No JVD, Gross hearing intact  Respiratory: CTA B/L, No wheezing, rales, rhonchi  CV: RRR, +S1/S2, -S3/S4, no murmurs, rubs or gallops  Abdominal: firm, distended, non-tender to deep palpation   MSK: 5/5 strength UE/LE bilaterally  Extremities: No edema, 2+ peripheral pulses  Skin: No Rashes, Hematoma, Ecchymosis  Incisions:   Tubes:    HOSPITAL MEDICATIONS:  MEDICATIONS  (STANDING):  chlordiazePOXIDE 50 milliGRAM(s) Oral every 6 hours  chlordiazePOXIDE 50 milliGRAM(s) Oral every 8 hours  chlordiazePOXIDE   Oral   dextrose 5%. 1000 milliLiter(s) (50 mL/Hr) IV Continuous <Continuous>  dextrose 5%. 1000 milliLiter(s) (100 mL/Hr) IV Continuous <Continuous>  dextrose 50% Injectable 25 Gram(s) IV Push once  dextrose 50% Injectable 12.5 Gram(s) IV Push once  dextrose 50% Injectable 25 Gram(s) IV Push once  enoxaparin Injectable 40 milliGRAM(s) SubCutaneous every 24 hours  famotidine    Tablet 20 milliGRAM(s) Oral two times a day  glucagon  Injectable 1 milliGRAM(s) IntraMuscular once  insulin glargine Injectable (LANTUS) 10 Unit(s) SubCutaneous <User Schedule>  insulin lispro (ADMELOG) corrective regimen sliding scale   SubCutaneous every 6 hours  lisinopril 5 milliGRAM(s) Oral daily  multivitamin 1 Tablet(s) Oral daily  sodium chloride 0.9%. 1000 milliLiter(s) (75 mL/Hr) IV Continuous <Continuous>  tamsulosin 0.4 milliGRAM(s) Oral at bedtime  thiamine IVPB 500 milliGRAM(s) IV Intermittent daily    MEDICATIONS  (PRN):  dextrose Oral Gel 15 Gram(s) Oral once PRN Blood Glucose LESS THAN 70 milliGRAM(s)/deciliter      LABS:                        14.3   5.75  )-----------( 145      ( 15 Aug 2022 10:02 )             41.6     Hgb Trend: 14.3<--  0815    128<L>  |  90<L>  |  10  ----------------------------<  376<H>  4.0   |  22  |  0.52    Ca    8.3<L>      15 Aug 2022 19:06  Mg     2.00     08-15    TPro  7.8  /  Alb  4.4  /  TBili  0.3  /  DBili  x   /  AST  57<H>  /  ALT  71<H>  /  AlkPhos  104  08-15    Creatinine Trend: 0.52<--, 0.46<--  PT/INR - ( 15 Aug 2022 14:55 )   PT: 11.0 sec;   INR: 0.95 ratio         PTT - ( 15 Aug 2022 14:55 )  PTT:29.6 sec  Urinalysis Basic - ( 15 Aug 2022 19:44 )    Color: Light Yellow / Appearance: Clear / S.017 / pH: x  Gluc: x / Ketone: Trace  / Bili: Negative / Urobili: <2 mg/dL   Blood: x / Protein: Negative / Nitrite: Negative   Leuk Esterase: Negative / RBC: 1 /HPF / WBC 1 /HPF   Sq Epi: x / Non Sq Epi: x / Bacteria: x        Venous Blood Gas:  08-15 @ 12:34  7.39/37/47/22/80.5  VBG Lactate: 5.9  Venous Blood Gas:  08-15 @ 11:30  7.38/37/52/22/84.3  VBG Lactate: 6.5  Venous Blood Gas:  08-15 @ 10:02  7.48/30/61/22/92.5  VBG Lactate: 5.8      MICROBIOLOGY:

## 2022-08-16 NOTE — PROGRESS NOTE ADULT - PROBLEM SELECTOR PLAN 2
Patient with marked hyperglycemia, had been on insulin previously for 3 months in 2019. Currently endorsing emesis, nausea, and lack of appetite, however not consistent with DKA and may represent progression of chronic diabetes.    Home glipizide and metformin held while inpatient   BHB .5, not likely DKA given no acidemia and low BHB, A1C of 8.2, likely undercontrolled on home regimen    - LR mIVF at 75cc/hr was switched to NS 75 cc/hr previously NPO can d/c fluid when patient eating  - NISS and basal insulin of 7u lantus --> changed to 10 overnight. Can stay at 10 given need for 13u insulin sliding scale overnight.   - may benefit from endo consult Patient with marked hyperglycemia, had been on insulin previously for 3 months in 2019. Currently endorsing emesis, nausea, and lack of appetite, however not consistent with DKA and may represent progression of chronic diabetes.    Home glipizide and metformin held while inpatient   BHB .5, not likely DKA given no acidemia and low BHB, A1C of 8.2, likely undercontrolled on home regimen    - LR mIVF at 75cc/hr was switched to NS 75 cc/hr previously NPO can d/c fluid when patient eating  - NISS and basal insulin of 7u lantus --> changed to 10 overnight. Can stay at 10 given need for 13u insulin sliding scale overnight. ---- now on 11u lantus with 5u premeal  - may benefit from endo consult considering this is a new insulin dependent T2DM given patient has been insulin naive for last 3 years.

## 2022-08-16 NOTE — PATIENT PROFILE ADULT - CAREGIVER ADDRESS
[facility] [address] [facility] [address]

## 2022-08-16 NOTE — CONSULT NOTE ADULT - SUBJECTIVE AND OBJECTIVE BOX
Reason For Consult: DM    HPI:  51M hx of alcohol use, DT's pancreatitis, DM, HTN, T2DM (previous A1C 12.9 in 2019), HLD presenting with full body tremors with slowed speech and left hand numbness        endocrine called for DM assistance :   He had previously been diagnosed with T2DM in 2019 and was started on insulin at that time. He took insulin for only a few months before transitioning to metformin. He has not used insulin since.  he follows with PCP   he checks FSBG once to twice a day  he states diet is controlled in carbs      pt states he is now on metformin and a second oral medication for DM (that he takes twice a a day, he does not know the name, i provided multilple names of DM meds that he does not the names)  he notes intermittent heavy drinking hx  he states the weeks he drinks he will stop his metformin     He drinks one pint of rum per day and was previously sober between June 2022 and 8/6/22 when he relapsed. He endorses a last drink of 1 pt rum and beer 8/14 afternoon. He endorses prior hospitalizations for alcohol withdrawal however has never required intubation. Of note, States over the last two days he has also had numerous episodes of NB/NB emesis, abdominal pain, nausea, and polyuria/mild dysuria.        PAST MEDICAL & SURGICAL HISTORY:  High blood pressure  By pt report; does not use any medications as of today (3/7/18)      Hyperlipidemia      Alcohol withdrawal with inpatient treatment, uncomplicated      H/O nose injury  ~ puncture wound - s/p surgical repair (teenage years)          FAMILY HISTORY:  No pertinent family history in first degree relatives        Social History:  lives with family   +etoh use     Outpatient Medications:  Home Medications:   * Patient Currently Takes Medications as of 08-Apr-2019 14:46 documented in Structured Notes  · 	U-100 Insulin Syringe, 1/2 mL: 1 application subcutaneously 2 times a day ** 1/2 mL holds up to 50 units of insulin **   · 	Syringes: Please dispense 60 syringes to be used for insulin injections.  · 	NovoLIN 70/30 subcutaneous suspension: 20 unit(s) subcutaneous before breakfast  	10 units subcutaneous before dinner  · 	Vitamin B1 100 mg oral tablet: 1 tab(s) orally once a day  · 	Multiple Vitamins oral tablet: 1 tab(s) orally once a day  · 	atorvastatin 40 mg oral tablet: 1 tab(s) orally once a day (at bedtime)  · 	alcohol swabs: Please dispense 200 alcohol swabs, to be used prior to testing blood sugar  · 	test strips: To be used 3 times a day before meals and once before bedtime. Please dispense 150 test strips.  · 	lancets: To be used 3 times a day before meals and at bedtime. Please dispense total 150 lancets  · 	glucometer: Please dispense 1 glcuometer, as covered by insurance  · 	lisinopril 5 mg oral tablet: 1 tab(s) orally once a day    MEDICATIONS  (STANDING):  chlordiazePOXIDE 50 milliGRAM(s) Oral every 8 hours  chlordiazePOXIDE   Oral   dextrose 5%. 1000 milliLiter(s) (50 mL/Hr) IV Continuous <Continuous>  dextrose 5%. 1000 milliLiter(s) (100 mL/Hr) IV Continuous <Continuous>  dextrose 50% Injectable 25 Gram(s) IV Push once  dextrose 50% Injectable 12.5 Gram(s) IV Push once  dextrose 50% Injectable 25 Gram(s) IV Push once  enoxaparin Injectable 40 milliGRAM(s) SubCutaneous every 24 hours  famotidine    Tablet 20 milliGRAM(s) Oral two times a day  glucagon  Injectable 1 milliGRAM(s) IntraMuscular once  insulin glargine Injectable (LANTUS) 11 Unit(s) SubCutaneous at bedtime  insulin lispro (ADMELOG) corrective regimen sliding scale   SubCutaneous three times a day before meals  insulin lispro (ADMELOG) corrective regimen sliding scale   SubCutaneous at bedtime  insulin lispro Injectable (ADMELOG) 5 Unit(s) SubCutaneous three times a day before meals  lisinopril 5 milliGRAM(s) Oral daily  multivitamin 1 Tablet(s) Oral daily  tamsulosin 0.4 milliGRAM(s) Oral at bedtime  thiamine IVPB 500 milliGRAM(s) IV Intermittent daily    MEDICATIONS  (PRN):  dextrose Oral Gel 15 Gram(s) Oral once PRN Blood Glucose LESS THAN 70 milliGRAM(s)/deciliter      Allergies    No Known Allergies    Intolerances      Review of Systems:  Constitutional: No fever  Eyes: No blurry vision  Neuro: No tremors  HEENT: No pain  Cardiovascular: No chest pain, palpitations  Respiratory: No SOB, no cough  GI: No nausea, vomiting, abdominal pain  : No dysuria  Skin: no rash  Psych: no depression  Endocrine: no polyuria, polydipsia  Hem/lymph: no swelling  Osteoporosis: no fractures    ALL OTHER SYSTEMS REVIEWED AND NEGATIVE      PHYSICAL EXAM:  VITALS: T(C): 36.7 (08-16-22 @ 16:45)  T(F): 98 (08-16-22 @ 16:45), Max: 98.8 (08-16-22 @ 00:28)  HR: 98 (08-16-22 @ 16:45) (75 - 98)  BP: 148/98 (08-16-22 @ 16:45) (107/78 - 148/98)  RR:  (16 - 18)  SpO2:  (99% - 100%)  Wt(kg): --  GENERAL: NAD, well-groomed, well-developed  RESPIRATORY: Clear to auscultation bilaterally; No rales, rhonchi, wheezing, or rubs  CARDIOVASCULAR: Regular rate and rhythm; No murmurs; no peripheral edema  GI: Soft, nontender, non distended, normal bowel sounds  SKIN: Dry, intact, No rashes or lesions  MUSCULOSKELETAL: Full range of motion, normal strength  NEURO: sensation intact, extraocular movements intact, no tremor, normal reflexes  PSYCH: Alert and oriented x 3, normal affect, normal mood    POCT Blood Glucose.: 276 mg/dL (08-16-22 @ 17:41)  POCT Blood Glucose.: 321 mg/dL (08-16-22 @ 14:41)  POCT Blood Glucose.: 436 mg/dL (08-16-22 @ 12:12)  POCT Blood Glucose.: 394 mg/dL (08-16-22 @ 12:08)  POCT Blood Glucose.: 512 mg/dL (08-16-22 @ 12:06)  POCT Blood Glucose.: 189 mg/dL (08-16-22 @ 08:39)  POCT Blood Glucose.: 209 mg/dL (08-16-22 @ 04:58)  POCT Blood Glucose.: 252 mg/dL (08-15-22 @ 23:31)  POCT Blood Glucose.: 392 mg/dL (08-15-22 @ 21:24)  POCT Blood Glucose.: 258 mg/dL (08-15-22 @ 17:56)  POCT Blood Glucose.: 277 mg/dL (08-15-22 @ 16:26)  POCT Blood Glucose.: 329 mg/dL (08-15-22 @ 09:44)                            13.8   6.62  )-----------( 127      ( 16 Aug 2022 07:31 )             39.9       08-16    131<L>  |  96<L>  |  9   ----------------------------<  175<H>  3.5   |  23  |  0.47<L>    eGFR: 124    Ca    8.3<L>      08-16  Mg     2.00     08-16  Phos  2.8     08-16    TPro  6.3  /  Alb  3.7  /  TBili  0.6  /  DBili  x   /  AST  29  /  ALT  50<H>  /  AlkPhos  69  08-16      Thyroid Function Tests:              Radiology:

## 2022-08-16 NOTE — PATIENT PROFILE ADULT - NSPROGENBLOODRESTRICT_GEN_A_NUR
According to the Bhcg results from today will have pt repeat Quantitative bhcg weekly until < 5    If any concern or  Bleeding worse or cramping worse please call us thru the answering service   none

## 2022-08-16 NOTE — PATIENT PROFILE ADULT - FALL HARM RISK - HARM RISK INTERVENTIONS
Assistance with ambulation/Assistance OOB with selected safe patient handling equipment/Communicate Risk of Fall with Harm to all staff/Discuss with provider need for PT consult/Monitor for mental status changes/Monitor gait and stability/Reinforce activity limits and safety measures with patient and family/Tailored Fall Risk Interventions/Toileting schedule using arm’s reach rule for commode and bathroom/Use of alarms - bed, chair and/or voice tab/Visual Cue: Yellow wristband and red socks/Bed in lowest position, wheels locked, appropriate side rails in place/Call bell, personal items and telephone in reach/Instruct patient to call for assistance before getting out of bed or chair/Non-slip footwear when patient is out of bed/[place] to call system/Physically safe environment - no spills, clutter or unnecessary equipment/Purposeful Proactive Rounding/Room/bathroom lighting operational, light cord in reach

## 2022-08-17 ENCOUNTER — TRANSCRIPTION ENCOUNTER (OUTPATIENT)
Age: 52
End: 2022-08-17

## 2022-08-17 LAB
ALBUMIN SERPL ELPH-MCNC: 4.4 G/DL — SIGNIFICANT CHANGE UP (ref 3.3–5)
ALP SERPL-CCNC: 86 U/L — SIGNIFICANT CHANGE UP (ref 40–120)
ALT FLD-CCNC: 59 U/L — HIGH (ref 4–41)
ANION GAP SERPL CALC-SCNC: 19 MMOL/L — HIGH (ref 7–14)
AST SERPL-CCNC: 39 U/L — SIGNIFICANT CHANGE UP (ref 4–40)
BILIRUB SERPL-MCNC: 0.6 MG/DL — SIGNIFICANT CHANGE UP (ref 0.2–1.2)
BUN SERPL-MCNC: 14 MG/DL — SIGNIFICANT CHANGE UP (ref 7–23)
CALCIUM SERPL-MCNC: 9 MG/DL — SIGNIFICANT CHANGE UP (ref 8.4–10.5)
CHLORIDE SERPL-SCNC: 93 MMOL/L — LOW (ref 98–107)
CO2 SERPL-SCNC: 20 MMOL/L — LOW (ref 22–31)
CREAT SERPL-MCNC: 0.63 MG/DL — SIGNIFICANT CHANGE UP (ref 0.5–1.3)
EGFR: 114 ML/MIN/1.73M2 — SIGNIFICANT CHANGE UP
GLUCOSE SERPL-MCNC: 293 MG/DL — HIGH (ref 70–99)
HCT VFR BLD CALC: 48 % — SIGNIFICANT CHANGE UP (ref 39–50)
HGB BLD-MCNC: 16.1 G/DL — SIGNIFICANT CHANGE UP (ref 13–17)
MAGNESIUM SERPL-MCNC: 2.2 MG/DL — SIGNIFICANT CHANGE UP (ref 1.6–2.6)
MCHC RBC-ENTMCNC: 29.9 PG — SIGNIFICANT CHANGE UP (ref 27–34)
MCHC RBC-ENTMCNC: 33.5 GM/DL — SIGNIFICANT CHANGE UP (ref 32–36)
MCV RBC AUTO: 89.2 FL — SIGNIFICANT CHANGE UP (ref 80–100)
NRBC # BLD: 0 /100 WBCS — SIGNIFICANT CHANGE UP (ref 0–0)
NRBC # FLD: 0 K/UL — SIGNIFICANT CHANGE UP (ref 0–0)
PHOSPHATE SERPL-MCNC: 3.5 MG/DL — SIGNIFICANT CHANGE UP (ref 2.5–4.5)
PLATELET # BLD AUTO: 133 K/UL — LOW (ref 150–400)
POTASSIUM SERPL-MCNC: 3.9 MMOL/L — SIGNIFICANT CHANGE UP (ref 3.5–5.3)
POTASSIUM SERPL-SCNC: 3.9 MMOL/L — SIGNIFICANT CHANGE UP (ref 3.5–5.3)
PROT SERPL-MCNC: 7.8 G/DL — SIGNIFICANT CHANGE UP (ref 6–8.3)
RBC # BLD: 5.38 M/UL — SIGNIFICANT CHANGE UP (ref 4.2–5.8)
RBC # FLD: 12 % — SIGNIFICANT CHANGE UP (ref 10.3–14.5)
SODIUM SERPL-SCNC: 132 MMOL/L — LOW (ref 135–145)
WBC # BLD: 5.51 K/UL — SIGNIFICANT CHANGE UP (ref 3.8–10.5)
WBC # FLD AUTO: 5.51 K/UL — SIGNIFICANT CHANGE UP (ref 3.8–10.5)

## 2022-08-17 PROCEDURE — 99232 SBSQ HOSP IP/OBS MODERATE 35: CPT

## 2022-08-17 PROCEDURE — 99232 SBSQ HOSP IP/OBS MODERATE 35: CPT | Mod: GC

## 2022-08-17 RX ORDER — POTASSIUM CHLORIDE 20 MEQ
40 PACKET (EA) ORAL ONCE
Refills: 0 | Status: COMPLETED | OUTPATIENT
Start: 2022-08-17 | End: 2022-08-17

## 2022-08-17 RX ORDER — INSULIN LISPRO 100/ML
8 VIAL (ML) SUBCUTANEOUS
Refills: 0 | Status: DISCONTINUED | OUTPATIENT
Start: 2022-08-17 | End: 2022-08-18

## 2022-08-17 RX ORDER — INSULIN GLARGINE 100 [IU]/ML
18 INJECTION, SOLUTION SUBCUTANEOUS AT BEDTIME
Refills: 0 | Status: DISCONTINUED | OUTPATIENT
Start: 2022-08-17 | End: 2022-08-18

## 2022-08-17 RX ADMIN — TAMSULOSIN HYDROCHLORIDE 0.4 MILLIGRAM(S): 0.4 CAPSULE ORAL at 22:24

## 2022-08-17 RX ADMIN — Medication 8: at 08:43

## 2022-08-17 RX ADMIN — ENOXAPARIN SODIUM 40 MILLIGRAM(S): 100 INJECTION SUBCUTANEOUS at 18:20

## 2022-08-17 RX ADMIN — Medication 2: at 22:25

## 2022-08-17 RX ADMIN — Medication 50 MILLIGRAM(S): at 13:34

## 2022-08-17 RX ADMIN — FAMOTIDINE 20 MILLIGRAM(S): 10 INJECTION INTRAVENOUS at 07:12

## 2022-08-17 RX ADMIN — INSULIN GLARGINE 18 UNIT(S): 100 INJECTION, SOLUTION SUBCUTANEOUS at 22:24

## 2022-08-17 RX ADMIN — FAMOTIDINE 20 MILLIGRAM(S): 10 INJECTION INTRAVENOUS at 18:20

## 2022-08-17 RX ADMIN — LISINOPRIL 5 MILLIGRAM(S): 2.5 TABLET ORAL at 07:12

## 2022-08-17 RX ADMIN — Medication 8 UNIT(S): at 18:20

## 2022-08-17 RX ADMIN — Medication 8 UNIT(S): at 13:14

## 2022-08-17 RX ADMIN — Medication 2: at 18:20

## 2022-08-17 RX ADMIN — Medication 6 MILLIGRAM(S): at 22:24

## 2022-08-17 RX ADMIN — Medication 1 TABLET(S): at 13:14

## 2022-08-17 RX ADMIN — Medication 105 MILLIGRAM(S): at 13:14

## 2022-08-17 RX ADMIN — Medication 8: at 13:14

## 2022-08-17 RX ADMIN — Medication 40 MILLIEQUIVALENT(S): at 13:12

## 2022-08-17 RX ADMIN — Medication 5 UNIT(S): at 08:43

## 2022-08-17 RX ADMIN — Medication 50 MILLIGRAM(S): at 03:30

## 2022-08-17 NOTE — PROGRESS NOTE ADULT - PROBLEM SELECTOR PLAN 2
Patient with marked hyperglycemia, had been on insulin previously for 3 months in 2019. Currently endorsing emesis, nausea, and lack of appetite, however not consistent with DKA and may represent progression of chronic diabetes.    Home glipizide and metformin held while inpatient   BHB .5, not likely DKA given no acidemia and low BHB, A1C of 8.2, likely under-controlled on home regimen    - Mod-ISS - on 11u lantus with 5u premeal, however still requiring 16u insulin throughout day  - endo onboard

## 2022-08-17 NOTE — DISCHARGE NOTE PROVIDER - HOSPITAL COURSE
Hospital course:  51M hx of alcohol use, previous admissions for DT's and pancreatitis, HTN, T2DM (previous A1C 12.9 in 2019 no longer on insulin), HLD presenting with full body tremors, slowed speech and left hand numbness that began 8/14 presenting with symptoms of alcohol withdrawal. Code stroke was called on him initially, had negative CThead for intracranial pathology. Had notably high lactate, which down trended after receiving 2L LR bolus and maintenance fluids. He initially had LUE weakness in distal digits which improved as he sobered from acute alcohol intoxication. He was seen by neurology and diagnosed with an acute alcohol induced neuropathy and his hand felt better.     He was started on a librium taper for alcohol withdrawal with CIWAS ranging 4-6. He attempted to leave AMA and was given ativan PO.    Throughout hospitalization he was hyperglycemic, low concern for DKA, likely metabolic derangement iso undertreated diabetes and snacking throughout stay. Started on insulin with endocrinology on board. Currently Lantus 18u premeal 8u. 51M hx of alcohol use, previous admissions for DT's and pancreatitis, HTN, T2DM (previous A1C 12.9 in 2019 no longer on insulin), HLD presenting with full body tremors, slowed speech and left hand numbness that began 8/14 presenting with symptoms of alcohol withdrawal. Code stroke was called on him initially, had negative CT head for intracranial pathology. Had notably high lactate, which down trended after receiving 2L LR bolus and maintenance fluids. He initially had LUE weakness in distal digits which improved as he sobered from acute alcohol intoxication. He was seen by neurology and diagnosed with an acute alcohol induced neuropathy and his hand felt better. Lated in hospital course pt has similar sensation in LEs. MRI head and neck showed ____.     He was started on a librium taper for alcohol withdrawal with CIWAS ranging 4-6.     Throughout hospitalization he was hyperglycemic, low concern for DKA, likely metabolic derangement iso undertreated diabetes and snacking throughout stay. Started on insulin with endocrinology on board. Pt being dc'd on _____; enod also rec'd to dc metformin iso ETOH abuse. 51M hx of alcohol use, previous admissions for DT's and pancreatitis, HTN, T2DM (previous A1C 12.9 in 2019 no longer on insulin), HLD presenting with full body tremors, slowed speech and left hand numbness that began 8/14 presenting with symptoms of alcohol withdrawal. Code stroke was called on him initially, had negative CT head for intracranial pathology. Had notably high lactate, which down trended after receiving 2L LR bolus and maintenance fluids. He initially had LUE weakness in distal digits which improved as he sobered from acute alcohol intoxication. He was seen by neurology and diagnosed with an acute alcohol induced neuropathy and his hand felt better. Lated in hospital course pt has similar sensation in LEs. MRI head and neck were normal, with no flow-limiting stenosis or structural issues.    He was started on a librium taper for alcohol withdrawal with CIWAS ranging 4-6 and completed the taper on 8/20.    Throughout hospitalization he was hyperglycemic, low concern for DKA, likely metabolic derangement iso undertreated diabetes and snacking throughout stay. Started on insulin with endocrinology on board. Pt being dc'd on Basaglar 40 units qhs and Admelog 20 units TID qac; enod also rec'd to dc metformin iso ETOH abuse.

## 2022-08-17 NOTE — PHYSICAL THERAPY INITIAL EVALUATION ADULT - PERTINENT HX OF CURRENT PROBLEM, REHAB EVAL
Pt is a 51 year old male who presented to [facility] with alcohol withdrawal, left UE numbness and full body tremors.

## 2022-08-17 NOTE — DISCHARGE NOTE PROVIDER - NSFOLLOWUPCLINICS_GEN_ALL_ED_FT
[facility] Endocrinology  Endocrinology  [address] [place] [address]  Phone: [phone]  Fax:   Follow Up Time: 2 weeks

## 2022-08-17 NOTE — DISCHARGE NOTE PROVIDER - NSDCCPCAREPLAN_GEN_ALL_CORE_FT
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 As your alcohol level decreased your hand strength began to return to normal. You are also at risk of diabetes induced weakness of your arms and legs or extremity weakness due to uncontrolled blood sugar levels.      SECONDARY DISCHARGE DIAGNOSES  Diagnosis: Type 2 diabetes mellitus with hyperglycemia  Assessment and Plan of Treatment: You were found to have high blood glucose levels concerning for worsening of your diabetes. You are at high risk of complications from diabetes especially with alcohol use. You were started on insulin, and given instruction on its proper use. You should follow up with your outpatient endocrinologist.    Diagnosis: Alcohol dependence with withdrawal  Assessment and Plan of Treatment: You were found to have signs of alcohol withdrawal which prompted us to begin a librium taper. Alcohol withdrawal is a potentially lethal side effect of beginning sobriety from alcohol. You received counseling with a  and should follow up with your primary care doctor to ensure sobriety.     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 As your alcohol level decreased your hand strength began to return to normal. You are also at risk of diabetes induced weakness of your arms and legs or extremity weakness due to uncontrolled blood sugar levels. The brain doctors assessed you and recommended MRI that showed ____.      SECONDARY DISCHARGE DIAGNOSES  Diagnosis: Alcohol dependence with withdrawal  Assessment and Plan of Treatment: You were found to have signs of alcohol withdrawal which prompted us to begin a librium taper. Alcohol withdrawal is a potentially lethal side effect of beginning sobriety from alcohol. You received counseling with a  and should follow up with your primary care doctor to ensure sobriety.    Diagnosis: Type 2 diabetes mellitus with hyperglycemia  Assessment and Plan of Treatment: You were found to have high blood glucose levels concerning for worsening of your diabetes. You are at high risk of complications from diabetes especially with alcohol use. The diabetes doctors assessed you and you started on insulin. You are beign discharged on _____. The diabetes doctors also instructed you to stop taking metformin as it can be dangerous when used with high levels of alcohol. You should follow up with your outpatient endocrinologist.     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 As your alcohol level decreased your hand strength began to return to normal. You are also at risk of diabetes induced weakness of your arms and legs or extremity weakness due to uncontrolled blood sugar levels. The brain doctors assessed you and recommended MRI that was normal. Please refrain from using alcohol and follow-up with your primary care provider for treatment for this condition.      SECONDARY DISCHARGE DIAGNOSES  Diagnosis: Alcohol dependence with withdrawal  Assessment and Plan of Treatment: You were found to have signs of alcohol withdrawal which prompted us to begin a librium taper. Alcohol withdrawal is a potentially lethal side effect of beginning sobriety from alcohol. You received counseling with a  and should follow up with your primary care doctor to ensure sobriety.    Diagnosis: Type 2 diabetes mellitus with hyperglycemia  Assessment and Plan of Treatment: You were found to have high blood glucose levels concerning for worsening of your diabetes. You are at high risk of complications from diabetes especially with alcohol use. The diabetes doctors assessed you and you started on insulin. You are being discharged on Basaglar 40 units, which is a long-acting insulin that you take before bedtime, and Admelog 20 units, which is a short-acting insulin that you take 15 minutes before breakfast, lunch, and dinner. If you take the short-acting insulin, you MUST eat a full meal. If you do not eat a full meal, there is the risk of the blood sugar becoming too low. If you are not eating a meal, you do not take the short-acting insulin. The diabetes doctors also instructed you to stop taking metformin as it can be dangerous when used with high levels of alcohol. You should follow up with the outpatient endocrinologist (diabetes doctor) so they can adjust your insulin as needed. Please remember to take your blood sugar before meals and at bedtime, which will help the diabetes doctors make changes to your regimen.

## 2022-08-17 NOTE — PROGRESS NOTE ADULT - SUBJECTIVE AND OBJECTIVE BOX
History: denies n/v.  reports good appetite.  speaks very low.  And repeats self over and over.  Hard to ilicit history    MEDICATIONS  (STANDING):  chlordiazePOXIDE 50 milliGRAM(s) Oral every 12 hours  chlordiazePOXIDE   Oral   dextrose 5%. 1000 milliLiter(s) (50 mL/Hr) IV Continuous <Continuous>  dextrose 5%. 1000 milliLiter(s) (100 mL/Hr) IV Continuous <Continuous>  dextrose 50% Injectable 25 Gram(s) IV Push once  dextrose 50% Injectable 12.5 Gram(s) IV Push once  dextrose 50% Injectable 25 Gram(s) IV Push once  enoxaparin Injectable 40 milliGRAM(s) SubCutaneous every 24 hours  famotidine    Tablet 20 milliGRAM(s) Oral two times a day  glucagon  Injectable 1 milliGRAM(s) IntraMuscular once  insulin glargine Injectable (LANTUS) 18 Unit(s) SubCutaneous at bedtime  insulin lispro (ADMELOG) corrective regimen sliding scale   SubCutaneous three times a day before meals  insulin lispro (ADMELOG) corrective regimen sliding scale   SubCutaneous at bedtime  insulin lispro Injectable (ADMELOG) 8 Unit(s) SubCutaneous three times a day before meals  lisinopril 5 milliGRAM(s) Oral daily  melatonin 6 milliGRAM(s) Oral at bedtime  multivitamin 1 Tablet(s) Oral daily  tamsulosin 0.4 milliGRAM(s) Oral at bedtime  thiamine IVPB 500 milliGRAM(s) IV Intermittent daily    MEDICATIONS  (PRN):  dextrose Oral Gel 15 Gram(s) Oral once PRN Blood Glucose LESS THAN 70 milliGRAM(s)/deciliter      Allergies    No Known Allergies    Intolerances      Review of Systems:  Constitutional: No fever      ALL OTHER SYSTEMS REVIEWED AND NEGATIVE        PHYSICAL EXAM:  VITALS: T(C): 36.3 (08-17-22 @ 12:48)  T(F): 97.4 (08-17-22 @ 12:48), Max: 97.8 (08-16-22 @ 20:45)  HR: 89 (08-17-22 @ 12:48) (89 - 113)  BP: 111/79 (08-17-22 @ 12:48) (110/78 - 147/98)  RR:  (16 - 17)  SpO2:  (98% - 100%)  Wt(kg): --  GENERAL: NAD, well-developed  EYES: No proptosis, no lid lag, anicteric  SKIN: Dry, intact, No rashes or lesions  PSYCH: Alert and oriented x 3, normal affect, normal mood      CAPILLARY BLOOD GLUCOSE    POCT Blood Glucose.: 350 mg/dL (17 Aug 2022 12:27)  POCT Blood Glucose.: 320 mg/dL (17 Aug 2022 08:33)  POCT Blood Glucose.: 337 mg/dL (16 Aug 2022 21:34)  POCT Blood Glucose.: 276 mg/dL (16 Aug 2022 17:41)      08-17    132<L>  |  93<L>  |  14  ----------------------------<  293<H>  3.9   |  20<L>  |  0.63    eGFR: 114    Ca    9.0      08-17  Mg     2.20     08-17  Phos  3.5     08-17    TPro  7.8  /  Alb  4.4  /  TBili  0.6  /  DBili  x   /  AST  39  /  ALT  59<H>  /  AlkPhos  86  08-17      A1C with Estimated Average Glucose (08.15.22 @ 10:02)    A1C with Estimated Average Glucose Result: 8.2 %    Estimated Average Glucose: 189

## 2022-08-17 NOTE — SBIRT NOTE ADULT - NSSBIRTALCPOSREINDET_GEN_A_CORE
provided patient with ARS/DAEHRS Addiction Services at [facility]: [address], [address]ore[address], [address] [place] [address] p: [phone], [facility] Crisis walk in clinic p:[phone], [facility] Treatment/Care Services for Substance Use List. This writer additionally reviewed "Rethink Drinking" booklet from [employer] - [place] Department of Health and Human Services

## 2022-08-17 NOTE — PROGRESS NOTE ADULT - SUBJECTIVE AND OBJECTIVE BOX
INCOMPLETE NOTE    Juliocesar Josecolemanmariposa | PGY1| Pager: [phone]  Interval Events:    REVIEW OF SYSTEMS:  CONSTITUTIONAL: No weakness, fevers or chills  EYES/ENT: No visual changes;  No vertigo or throat pain   NECK: No pain or stiffness  RESPIRATORY: No cough, wheezing, hemoptysis; No shortness of breath  CARDIOVASCULAR: No chest pain or palpitations  GASTROINTESTINAL: No abdominal or epigastric pain. No nausea, vomiting, or hematemesis; No diarrhea or constipation. No melena or hematochezia.  GENITOURINARY: No dysuria, frequency or hematuria  NEUROLOGICAL: No numbness or weakness  SKIN: No itching, burning, rashes, or lesions   All other review of systems is negative unless indicated above.    OBJECTIVE:  ICU Vital Signs Last 24 Hrs  T(C): 36.3 (17 Aug 2022 04:44), Max: 36.7 (16 Aug 2022 16:45)  T(F): 97.4 (17 Aug 2022 04:44), Max: 98 (16 Aug 2022 16:45)  HR: 90 (17 Aug 2022 04:44) (86 - 100)  BP: 114/80 (17 Aug 2022 04:44) (110/78 - 148/98)  BP(mean): --  ABP: --  ABP(mean): --  RR: 17 (17 Aug 2022 04:44) (16 - 18)  SpO2: 99% (17 Aug 2022 04:44) (98% - 100%)    O2 Parameters below as of 17 Aug 2022 04:44  Patient On (Oxygen Delivery Method): room air              CAPILLARY BLOOD GLUCOSE      POCT Blood Glucose.: 337 mg/dL (16 Aug 2022 21:34)      PHYSICAL EXAM:  General: WN/WD NAD  Neurology: A&Ox3, nonfocal, HAMM x 4  Eyes: PERRLA/ EOMI, Gross vision intact  ENT/Neck: Neck supple, trachea midline, No JVD, Gross hearing intact  Respiratory: CTA B/L, No wheezing, rales, rhonchi  CV: RRR, +S1/S2, -S3/S4, no murmurs, rubs or gallops  Abdominal: Soft, NT, ND +BS, No HSM  MSK: 5/5 strength UE/LE bilaterally  Extremities: No edema, 2+ peripheral pulses  Skin: No Rashes, Hematoma, Ecchymosis  Incisions:   Tubes:    HOSPITAL MEDICATIONS:  MEDICATIONS  (STANDING):  chlordiazePOXIDE 50 milliGRAM(s) Oral every 8 hours  chlordiazePOXIDE 50 milliGRAM(s) Oral every 12 hours  chlordiazePOXIDE   Oral   dextrose 5%. 1000 milliLiter(s) (50 mL/Hr) IV Continuous <Continuous>  dextrose 5%. 1000 milliLiter(s) (100 mL/Hr) IV Continuous <Continuous>  dextrose 50% Injectable 25 Gram(s) IV Push once  dextrose 50% Injectable 12.5 Gram(s) IV Push once  dextrose 50% Injectable 25 Gram(s) IV Push once  enoxaparin Injectable 40 milliGRAM(s) SubCutaneous every 24 hours  famotidine    Tablet 20 milliGRAM(s) Oral two times a day  glucagon  Injectable 1 milliGRAM(s) IntraMuscular once  insulin glargine Injectable (LANTUS) 11 Unit(s) SubCutaneous at bedtime  insulin lispro (ADMELOG) corrective regimen sliding scale   SubCutaneous three times a day before meals  insulin lispro (ADMELOG) corrective regimen sliding scale   SubCutaneous at bedtime  insulin lispro Injectable (ADMELOG) 5 Unit(s) SubCutaneous three times a day before meals  lisinopril 5 milliGRAM(s) Oral daily  melatonin 6 milliGRAM(s) Oral at bedtime  multivitamin 1 Tablet(s) Oral daily  tamsulosin 0.4 milliGRAM(s) Oral at bedtime  thiamine IVPB 500 milliGRAM(s) IV Intermittent daily    MEDICATIONS  (PRN):  dextrose Oral Gel 15 Gram(s) Oral once PRN Blood Glucose LESS THAN 70 milliGRAM(s)/deciliter      LABS:                        13.8   6.62  )-----------( 127      ( 16 Aug 2022 07:31 )             39.9     Hgb Trend: 13.8<--, 14.3<--  08-16    131<L>  |  96<L>  |  9   ----------------------------<  175<H>  3.5   |  23  |  0.47<L>    Ca    8.3<L>      16 Aug 2022 07:31  Phos  2.8     08-16  Mg     2.00     08-16    TPro  6.3  /  Alb  3.7  /  TBili  0.6  /  DBili  x   /  AST  29  /  ALT  50<H>  /  AlkPhos  69      Creatinine Trend: 0.47<--, 0.52<--, 0.46<--  PT/INR - ( 15 Aug 2022 14:55 )   PT: 11.0 sec;   INR: 0.95 ratio         PTT - ( 15 Aug 2022 14:55 )  PTT:29.6 sec  Urinalysis Basic - ( 15 Aug 2022 19:44 )    Color: Light Yellow / Appearance: Clear / S.017 / pH: x  Gluc: x / Ketone: Trace  / Bili: Negative / Urobili: <2 mg/dL   Blood: x / Protein: Negative / Nitrite: Negative   Leuk Esterase: Negative / RBC: 1 /HPF / WBC 1 /HPF   Sq Epi: x / Non Sq Epi: x / Bacteria: x        Venous Blood Gas:  08-15 @ 12:34  7.39/37/47/22/80.5  VBG Lactate: 5.9  Venous Blood Gas:  08-15 @ 11:30  7.38/37/52/22/84.3  VBG Lactate: 6.5  Venous Blood Gas:  08-15 @ 10:02  7.48/30/61/22/92.5  VBG Lactate: 5.8      MICROBIOLOGY:      Juliocesar Josearuna | PGY1| Pager: [phone]  Interval Events: Patient atempted to leave AMA yesterday evening, received ativan .5mg, states wanted to go to work this morning. CIWA 4->6->4 overnight. Endorsing does not want to be on insulin as outpatient as it makes his stomach hurt, advised of risks and benefits of insulin therapy. Stating still wants to leave this morning, advised of risks of leaving AMA and potential for alcohol withdrawal exacerbation and diabetic complications. Denies all ROS.     REVIEW OF SYSTEMS:    All other review of systems is negative unless indicated above.    OBJECTIVE:  ICU Vital Signs Last 24 Hrs  T(C): 36.3 (17 Aug 2022 04:44), Max: 36.7 (16 Aug 2022 16:45)  T(F): 97.4 (17 Aug 2022 04:44), Max: 98 (16 Aug 2022 16:45)  HR: 90 (17 Aug 2022 04:44) (86 - 100)  BP: 114/80 (17 Aug 2022 04:44) (110/78 - 148/98)  BP(mean): --  ABP: --  ABP(mean): --  RR: 17 (17 Aug 2022 04:44) (16 - 18)  SpO2: 99% (17 Aug 2022 04:44) (98% - 100%)    O2 Parameters below as of 17 Aug 2022 04:44  Patient On (Oxygen Delivery Method): room air              CAPILLARY BLOOD GLUCOSE      POCT Blood Glucose.: 337 mg/dL (16 Aug 2022 21:34)      PHYSICAL EXAM:  General: WN/WD NAD  Neurology: A&Ox3, nonfocal, HAMM x 4  Eyes: PERRLA/ EOMI, Gross vision intact  ENT/Neck: Neck supple, trachea midline, No JVD, Gross hearing intact  Respiratory: CTA B/L, No wheezing, rales, rhonchi  CV: RRR, +S1/S2, -S3/S4, no murmurs, rubs or gallops  Abdominal: Soft, NT, ND +BS, No HSM  MSK: 5/5 strength UE/LE bilaterally  Extremities: No edema, 2+ peripheral pulses  Skin: No Rashes, Hematoma, Ecchymosis  Incisions:   Tubes:    HOSPITAL MEDICATIONS:  MEDICATIONS  (STANDING):  chlordiazePOXIDE 50 milliGRAM(s) Oral every 8 hours  chlordiazePOXIDE 50 milliGRAM(s) Oral every 12 hours  chlordiazePOXIDE   Oral   dextrose 5%. 1000 milliLiter(s) (50 mL/Hr) IV Continuous <Continuous>  dextrose 5%. 1000 milliLiter(s) (100 mL/Hr) IV Continuous <Continuous>  dextrose 50% Injectable 25 Gram(s) IV Push once  dextrose 50% Injectable 12.5 Gram(s) IV Push once  dextrose 50% Injectable 25 Gram(s) IV Push once  enoxaparin Injectable 40 milliGRAM(s) SubCutaneous every 24 hours  famotidine    Tablet 20 milliGRAM(s) Oral two times a day  glucagon  Injectable 1 milliGRAM(s) IntraMuscular once  insulin glargine Injectable (LANTUS) 11 Unit(s) SubCutaneous at bedtime  insulin lispro (ADMELOG) corrective regimen sliding scale   SubCutaneous three times a day before meals  insulin lispro (ADMELOG) corrective regimen sliding scale   SubCutaneous at bedtime  insulin lispro Injectable (ADMELOG) 5 Unit(s) SubCutaneous three times a day before meals  lisinopril 5 milliGRAM(s) Oral daily  melatonin 6 milliGRAM(s) Oral at bedtime  multivitamin 1 Tablet(s) Oral daily  tamsulosin 0.4 milliGRAM(s) Oral at bedtime  thiamine IVPB 500 milliGRAM(s) IV Intermittent daily    MEDICATIONS  (PRN):  dextrose Oral Gel 15 Gram(s) Oral once PRN Blood Glucose LESS THAN 70 milliGRAM(s)/deciliter      LABS:                        13.8   6.62  )-----------( 127      ( 16 Aug 2022 07:31 )             39.9     Hgb Trend: 13.8<--, 14.3<--      131<L>  |  96<L>  |  9   ----------------------------<  175<H>  3.5   |  23  |  0.47<L>    Ca    8.3<L>      16 Aug 2022 07:31  Phos  2.8       Mg     2.00         TPro  6.3  /  Alb  3.7  /  TBili  0.6  /  DBili  x   /  AST  29  /  ALT  50<H>  /  AlkPhos  69      Creatinine Trend: 0.47<--, 0.52<--, 0.46<--  PT/INR - ( 15 Aug 2022 14:55 )   PT: 11.0 sec;   INR: 0.95 ratio         PTT - ( 15 Aug 2022 14:55 )  PTT:29.6 sec  Urinalysis Basic - ( 15 Aug 2022 19:44 )    Color: Light Yellow / Appearance: Clear / S.017 / pH: x  Gluc: x / Ketone: Trace  / Bili: Negative / Urobili: <2 mg/dL   Blood: x / Protein: Negative / Nitrite: Negative   Leuk Esterase: Negative / RBC: 1 /HPF / WBC 1 /HPF   Sq Epi: x / Non Sq Epi: x / Bacteria: x        Venous Blood Gas:  08-15 @ 12:34  7.39/37/47/22/80.5  VBG Lactate: 5.9  Venous Blood Gas:  08-15 @ 11:30  7.38/37/52/22/84.3  VBG Lactate: 6.5  Venous Blood Gas:  08-15 @ 10:02  7.48/30/61/22/92.5  VBG Lactate: 5.8      MICROBIOLOGY:

## 2022-08-17 NOTE — PHYSICAL THERAPY INITIAL EVALUATION ADULT - LEVEL OF INDEPENDENCE: GAIT, REHAB EVAL
minimum assist (75% patients effort) Cellcept Pregnancy And Lactation Text: This medication is Pregnancy Category D and isn't considered safe during pregnancy. It is unknown if this medication is excreted in breast milk.

## 2022-08-17 NOTE — DISCHARGE NOTE PROVIDER - NSDCFUADDAPPT_GEN_ALL_CORE_FT
Please follow-up with the endocrinology doctors at the contact above. You can also follow-up with your own endocrinologist for further management.

## 2022-08-17 NOTE — DISCHARGE NOTE PROVIDER - CARE PROVIDER_API CALL
JORDEN BAR  Internal Medicine  [address] [place] [address]  Phone: [phone]  Fax: [phone]  Follow Up Time:

## 2022-08-17 NOTE — PHYSICAL THERAPY INITIAL EVALUATION ADULT - ADDITIONAL COMMENTS
Pt lives with his wife in an apartment with 7-8 stairs to enter the building and an elevator to his apartment. prior to admission pt was independent with all mobility and ambulated without an assistive device. Pt endorsed 1 fall over the last year.     Pt. left comfortable in bed with all tubes/lines intact, +bed alarm, head of bed elevated 30 degrees, call bell in reach and in NAD.

## 2022-08-17 NOTE — DISCHARGE NOTE PROVIDER - NSDCMRMEDTOKEN_GEN_ALL_CORE_FT
alcohol swabs : Apply topically to affected area 4 times a day   atorvastatin 40 mg oral tablet: 1 tab(s) orally once a day (at bedtime)  famotidine 20 mg oral tablet: 1 tab(s) orally 2 times a day  glipiZIDE 5 mg oral tablet: 1 tab(s) orally 2 times a day  glucometer (per patient&#x27;s insurance): Test blood sugars four times a day. Dispense #1 glucometer.  lancets: 1 application subcutaneously 4 times a day   lisinopril 5 mg oral tablet: 1 tab(s) orally once a day  MetFORMIN (Eqv-Fortamet): 850 milligram(s) orally 2 times a day  tamsulosin 0.4 mg oral capsule: 1 cap(s) orally once a day  test strips (per patient&#x27;s insurance): 1 application subcutaneously 4 times a day. ** Compatible with patient&#x27;s glucometer **   alcohol swabs : Apply topically to affected area 4 times a day   atorvastatin 40 mg oral tablet: 1 tab(s) orally once a day (at bedtime)  famotidine 20 mg oral tablet: 1 tab(s) orally 2 times a day  glucometer (per patient&#x27;s insurance): Test blood sugars four times a day. Dispense #1 glucometer.  lancets: 1 application subcutaneously 4 times a day   lisinopril 5 mg oral tablet: 1 tab(s) orally once a day  tamsulosin 0.4 mg oral capsule: 1 cap(s) orally once a day  test strips (per patient&#x27;s insurance): 1 application subcutaneously 4 times a day. ** Compatible with patient&#x27;s glucometer **   Admelog SoloStar 100 units/mL injectable solution: 20 unit(s) injectable 3 times a day (before meals)  alcohol swabs : Apply topically to affected area 4 times a day   atorvastatin 40 mg oral tablet: 1 tab(s) orally once a day (at bedtime)  Basaglar KwikPen 100 units/mL subcutaneous solution: 40 unit(s) subcutaneous once a day (at bedtime)  famotidine 20 mg oral tablet: 1 tab(s) orally 2 times a day  glucometer (per patient&#x27;s insurance): Test blood sugars four times a day. Dispense #1 glucometer.  Insulin Pen Needles, 4mm: 1 application subcutaneously 4 times a day. ** Use with insulin pen **   lancets: 1 application subcutaneously 4 times a day   lisinopril 5 mg oral tablet: 1 tab(s) orally once a day  tamsulosin 0.4 mg oral capsule: 1 cap(s) orally once a day  test strips (per patient&#x27;s insurance): 1 application subcutaneously 4 times a day. ** Compatible with patient&#x27;s glucometer **

## 2022-08-18 LAB
ALBUMIN SERPL ELPH-MCNC: 4 G/DL — SIGNIFICANT CHANGE UP (ref 3.3–5)
ALP SERPL-CCNC: 68 U/L — SIGNIFICANT CHANGE UP (ref 40–120)
ALT FLD-CCNC: 64 U/L — HIGH (ref 4–41)
ANION GAP SERPL CALC-SCNC: 13 MMOL/L — SIGNIFICANT CHANGE UP (ref 7–14)
AST SERPL-CCNC: 42 U/L — HIGH (ref 4–40)
BILIRUB SERPL-MCNC: 0.3 MG/DL — SIGNIFICANT CHANGE UP (ref 0.2–1.2)
BUN SERPL-MCNC: 14 MG/DL — SIGNIFICANT CHANGE UP (ref 7–23)
CALCIUM SERPL-MCNC: 8.9 MG/DL — SIGNIFICANT CHANGE UP (ref 8.4–10.5)
CHLORIDE SERPL-SCNC: 97 MMOL/L — LOW (ref 98–107)
CO2 SERPL-SCNC: 21 MMOL/L — LOW (ref 22–31)
CREAT SERPL-MCNC: 0.65 MG/DL — SIGNIFICANT CHANGE UP (ref 0.5–1.3)
EGFR: 113 ML/MIN/1.73M2 — SIGNIFICANT CHANGE UP
GLUCOSE SERPL-MCNC: 305 MG/DL — HIGH (ref 70–99)
HCT VFR BLD CALC: 42.9 % — SIGNIFICANT CHANGE UP (ref 39–50)
HGB BLD-MCNC: 14.9 G/DL — SIGNIFICANT CHANGE UP (ref 13–17)
MAGNESIUM SERPL-MCNC: 2.1 MG/DL — SIGNIFICANT CHANGE UP (ref 1.6–2.6)
MCHC RBC-ENTMCNC: 30.6 PG — SIGNIFICANT CHANGE UP (ref 27–34)
MCHC RBC-ENTMCNC: 34.7 GM/DL — SIGNIFICANT CHANGE UP (ref 32–36)
MCV RBC AUTO: 88.1 FL — SIGNIFICANT CHANGE UP (ref 80–100)
NRBC # BLD: 0 /100 WBCS — SIGNIFICANT CHANGE UP (ref 0–0)
NRBC # FLD: 0 K/UL — SIGNIFICANT CHANGE UP (ref 0–0)
PHOSPHATE SERPL-MCNC: 4.3 MG/DL — SIGNIFICANT CHANGE UP (ref 2.5–4.5)
PLATELET # BLD AUTO: 134 K/UL — LOW (ref 150–400)
POTASSIUM SERPL-MCNC: 4 MMOL/L — SIGNIFICANT CHANGE UP (ref 3.5–5.3)
POTASSIUM SERPL-SCNC: 4 MMOL/L — SIGNIFICANT CHANGE UP (ref 3.5–5.3)
PROT SERPL-MCNC: 6.8 G/DL — SIGNIFICANT CHANGE UP (ref 6–8.3)
RBC # BLD: 4.87 M/UL — SIGNIFICANT CHANGE UP (ref 4.2–5.8)
RBC # FLD: 12.4 % — SIGNIFICANT CHANGE UP (ref 10.3–14.5)
SODIUM SERPL-SCNC: 131 MMOL/L — LOW (ref 135–145)
WBC # BLD: 6.43 K/UL — SIGNIFICANT CHANGE UP (ref 3.8–10.5)
WBC # FLD AUTO: 6.43 K/UL — SIGNIFICANT CHANGE UP (ref 3.8–10.5)

## 2022-08-18 PROCEDURE — 99232 SBSQ HOSP IP/OBS MODERATE 35: CPT

## 2022-08-18 PROCEDURE — 99232 SBSQ HOSP IP/OBS MODERATE 35: CPT | Mod: GC

## 2022-08-18 RX ORDER — INSULIN GLARGINE 100 [IU]/ML
24 INJECTION, SOLUTION SUBCUTANEOUS AT BEDTIME
Refills: 0 | Status: DISCONTINUED | OUTPATIENT
Start: 2022-08-18 | End: 2022-08-19

## 2022-08-18 RX ORDER — ACETAMINOPHEN 500 MG
1000 TABLET ORAL ONCE
Refills: 0 | Status: COMPLETED | OUTPATIENT
Start: 2022-08-18 | End: 2022-08-18

## 2022-08-18 RX ORDER — THIAMINE MONONITRATE (VIT B1) 100 MG
100 TABLET ORAL DAILY
Refills: 0 | Status: DISCONTINUED | OUTPATIENT
Start: 2022-08-19 | End: 2022-08-21

## 2022-08-18 RX ORDER — INSULIN LISPRO 100/ML
11 VIAL (ML) SUBCUTANEOUS
Refills: 0 | Status: DISCONTINUED | OUTPATIENT
Start: 2022-08-18 | End: 2022-08-19

## 2022-08-18 RX ADMIN — Medication 50 MILLIGRAM(S): at 06:36

## 2022-08-18 RX ADMIN — LISINOPRIL 5 MILLIGRAM(S): 2.5 TABLET ORAL at 06:36

## 2022-08-18 RX ADMIN — Medication 8: at 18:12

## 2022-08-18 RX ADMIN — Medication 50 MILLIGRAM(S): at 00:09

## 2022-08-18 RX ADMIN — Medication 6: at 08:53

## 2022-08-18 RX ADMIN — Medication 1000 MILLIGRAM(S): at 16:51

## 2022-08-18 RX ADMIN — FAMOTIDINE 20 MILLIGRAM(S): 10 INJECTION INTRAVENOUS at 06:36

## 2022-08-18 RX ADMIN — Medication 400 MILLIGRAM(S): at 16:21

## 2022-08-18 RX ADMIN — Medication 11 UNIT(S): at 12:50

## 2022-08-18 RX ADMIN — Medication 8 UNIT(S): at 08:53

## 2022-08-18 RX ADMIN — Medication 50 MILLIGRAM(S): at 18:36

## 2022-08-18 RX ADMIN — Medication 11 UNIT(S): at 18:13

## 2022-08-18 RX ADMIN — Medication 8: at 12:50

## 2022-08-18 RX ADMIN — Medication 1 TABLET(S): at 12:52

## 2022-08-18 RX ADMIN — Medication 4: at 21:53

## 2022-08-18 RX ADMIN — FAMOTIDINE 20 MILLIGRAM(S): 10 INJECTION INTRAVENOUS at 18:14

## 2022-08-18 RX ADMIN — Medication 105 MILLIGRAM(S): at 14:16

## 2022-08-18 RX ADMIN — INSULIN GLARGINE 24 UNIT(S): 100 INJECTION, SOLUTION SUBCUTANEOUS at 21:52

## 2022-08-18 RX ADMIN — ENOXAPARIN SODIUM 40 MILLIGRAM(S): 100 INJECTION SUBCUTANEOUS at 18:13

## 2022-08-18 RX ADMIN — Medication 6 MILLIGRAM(S): at 21:53

## 2022-08-18 RX ADMIN — TAMSULOSIN HYDROCHLORIDE 0.4 MILLIGRAM(S): 0.4 CAPSULE ORAL at 21:52

## 2022-08-18 NOTE — PROGRESS NOTE ADULT - SUBJECTIVE AND OBJECTIVE BOX
INCOMPLETE NOTE    Juliocesar Josecolemanmariposa | PGY1| Pager: [phone]  Interval Events:    REVIEW OF SYSTEMS:  CONSTITUTIONAL: No weakness, fevers or chills  EYES/ENT: No visual changes;  No vertigo or throat pain   NECK: No pain or stiffness  RESPIRATORY: No cough, wheezing, hemoptysis; No shortness of breath  CARDIOVASCULAR: No chest pain or palpitations  GASTROINTESTINAL: No abdominal or epigastric pain. No nausea, vomiting, or hematemesis; No diarrhea or constipation. No melena or hematochezia.  GENITOURINARY: No dysuria, frequency or hematuria  NEUROLOGICAL: No numbness or weakness  SKIN: No itching, burning, rashes, or lesions   All other review of systems is negative unless indicated above.    OBJECTIVE:  ICU Vital Signs Last 24 Hrs  T(C): 36.3 (18 Aug 2022 05:07), Max: 36.7 (17 Aug 2022 20:44)  T(F): 97.4 (18 Aug 2022 05:07), Max: 98 (17 Aug 2022 20:44)  HR: 88 (18 Aug 2022 05:07) (88 - 113)  BP: 115/69 (18 Aug 2022 05:07) (110/85 - 143/104)  BP(mean): --  ABP: --  ABP(mean): --  RR: 16 (18 Aug 2022 05:07) (16 - 17)  SpO2: 100% (18 Aug 2022 05:07) (99% - 100%)    O2 Parameters below as of 18 Aug 2022 05:07  Patient On (Oxygen Delivery Method): room air              CAPILLARY BLOOD GLUCOSE      POCT Blood Glucose.: 264 mg/dL (17 Aug 2022 21:43)      PHYSICAL EXAM:  General: WN/WD NAD  Neurology: A&Ox3, nonfocal, HAMM x 4  Eyes: PERRLA/ EOMI, Gross vision intact  ENT/Neck: Neck supple, trachea midline, No JVD, Gross hearing intact  Respiratory: CTA B/L, No wheezing, rales, rhonchi  CV: RRR, +S1/S2, -S3/S4, no murmurs, rubs or gallops  Abdominal: Soft, NT, ND +BS, No HSM  MSK: 5/5 strength UE/LE bilaterally  Extremities: No edema, 2+ peripheral pulses  Skin: No Rashes, Hematoma, Ecchymosis  Incisions:   Tubes:    HOSPITAL MEDICATIONS:  MEDICATIONS  (STANDING):  chlordiazePOXIDE 50 milliGRAM(s) Oral once  chlordiazePOXIDE   Oral   dextrose 5%. 1000 milliLiter(s) (50 mL/Hr) IV Continuous <Continuous>  dextrose 5%. 1000 milliLiter(s) (100 mL/Hr) IV Continuous <Continuous>  dextrose 50% Injectable 25 Gram(s) IV Push once  dextrose 50% Injectable 12.5 Gram(s) IV Push once  dextrose 50% Injectable 25 Gram(s) IV Push once  enoxaparin Injectable 40 milliGRAM(s) SubCutaneous every 24 hours  famotidine    Tablet 20 milliGRAM(s) Oral two times a day  glucagon  Injectable 1 milliGRAM(s) IntraMuscular once  insulin glargine Injectable (LANTUS) 18 Unit(s) SubCutaneous at bedtime  insulin lispro (ADMELOG) corrective regimen sliding scale   SubCutaneous three times a day before meals  insulin lispro (ADMELOG) corrective regimen sliding scale   SubCutaneous at bedtime  insulin lispro Injectable (ADMELOG) 8 Unit(s) SubCutaneous three times a day before meals  lisinopril 5 milliGRAM(s) Oral daily  melatonin 6 milliGRAM(s) Oral at bedtime  multivitamin 1 Tablet(s) Oral daily  tamsulosin 0.4 milliGRAM(s) Oral at bedtime  thiamine IVPB 500 milliGRAM(s) IV Intermittent daily    MEDICATIONS  (PRN):  dextrose Oral Gel 15 Gram(s) Oral once PRN Blood Glucose LESS THAN 70 milliGRAM(s)/deciliter      LABS:                        16.1   5.51  )-----------( 133      ( 17 Aug 2022 07:05 )             48.0     Hgb Trend: 16.1<--, 13.8<--, 14.3<--  08-17    132<L>  |  93<L>  |  14  ----------------------------<  293<H>  3.9   |  20<L>  |  0.63    Ca    9.0      17 Aug 2022 07:05  Phos  3.5     08-17  Mg     2.20     08-17    TPro  7.8  /  Alb  4.4  /  TBili  0.6  /  DBili  x   /  AST  39  /  ALT  59<H>  /  AlkPhos  86  08-17    Creatinine Trend: 0.63<--, 0.47<--, 0.52<--, 0.46<--            MICROBIOLOGY:      Juliocesar Schultz | PGY1| Pager: [phone]    Interval Events: Patient seen and examined at bedside. Attempting to stand/ sit re-orient on bed. Endorsing only wants holistic methods to treat blood glucose and his now resolved LUE neuropathy. Has poor insight into his condition and poor judgment related to condition. Was endorsing that he stubs his LLE toes frequently while walking while also endorsing that he is able to walk without help. Has been noted to have body tremors, and is very shaky when attempting to drink liquids. Patient currently without capacity.      REVIEW OF SYSTEMS:  All other review of systems is negative unless indicated above.    OBJECTIVE:  ICU Vital Signs Last 24 Hrs  T(C): 36.3 (18 Aug 2022 05:07), Max: 36.7 (17 Aug 2022 20:44)  T(F): 97.4 (18 Aug 2022 05:07), Max: 98 (17 Aug 2022 20:44)  HR: 88 (18 Aug 2022 05:07) (88 - 113)  BP: 115/69 (18 Aug 2022 05:07) (110/85 - 143/104)  BP(mean): --  ABP: --  ABP(mean): --  RR: 16 (18 Aug 2022 05:07) (16 - 17)  SpO2: 100% (18 Aug 2022 05:07) (99% - 100%)    O2 Parameters below as of 18 Aug 2022 05:07  Patient On (Oxygen Delivery Method): room air              CAPILLARY BLOOD GLUCOSE      POCT Blood Glucose.: 264 mg/dL (17 Aug 2022 21:43)      PHYSICAL EXAM:  General: WN/WD NAD, visibly tremulous, adjusting in bed   Neurology: A&Ox3, nonfocal, HAMM x 4  Eyes: PERRLA/ EOMI, Gross vision intact  ENT/Neck: Neck supple, trachea midline, No JVD, Gross hearing intact  Respiratory: CTA B/L, No wheezing, rales, rhonchi  CV: RRR, +S1/S2, -S3/S4, no murmurs, rubs or gallops  Abdominal: Soft, NT, ND +BS, No HSM  MSK: 5/5 strength UE/LE bilaterally  Extremities: No edema, 2+ peripheral pulses  Skin: No Rashes, Hematoma, Ecchymosis  Incisions:   Tubes:    HOSPITAL MEDICATIONS:  MEDICATIONS  (STANDING):  chlordiazePOXIDE 50 milliGRAM(s) Oral once  chlordiazePOXIDE   Oral   dextrose 5%. 1000 milliLiter(s) (50 mL/Hr) IV Continuous <Continuous>  dextrose 5%. 1000 milliLiter(s) (100 mL/Hr) IV Continuous <Continuous>  dextrose 50% Injectable 25 Gram(s) IV Push once  dextrose 50% Injectable 12.5 Gram(s) IV Push once  dextrose 50% Injectable 25 Gram(s) IV Push once  enoxaparin Injectable 40 milliGRAM(s) SubCutaneous every 24 hours  famotidine    Tablet 20 milliGRAM(s) Oral two times a day  glucagon  Injectable 1 milliGRAM(s) IntraMuscular once  insulin glargine Injectable (LANTUS) 18 Unit(s) SubCutaneous at bedtime  insulin lispro (ADMELOG) corrective regimen sliding scale   SubCutaneous three times a day before meals  insulin lispro (ADMELOG) corrective regimen sliding scale   SubCutaneous at bedtime  insulin lispro Injectable (ADMELOG) 8 Unit(s) SubCutaneous three times a day before meals  lisinopril 5 milliGRAM(s) Oral daily  melatonin 6 milliGRAM(s) Oral at bedtime  multivitamin 1 Tablet(s) Oral daily  tamsulosin 0.4 milliGRAM(s) Oral at bedtime  thiamine IVPB 500 milliGRAM(s) IV Intermittent daily    MEDICATIONS  (PRN):  dextrose Oral Gel 15 Gram(s) Oral once PRN Blood Glucose LESS THAN 70 milliGRAM(s)/deciliter      LABS:                        16.1   5.51  )-----------( 133      ( 17 Aug 2022 07:05 )             48.0     Hgb Trend: 16.1<--, 13.8<--, 14.3<--  08-17    132<L>  |  93<L>  |  14  ----------------------------<  293<H>  3.9   |  20<L>  |  0.63    Ca    9.0      17 Aug 2022 07:05  Phos  3.5     08-17  Mg     2.20     08-17    TPro  7.8  /  Alb  4.4  /  TBili  0.6  /  DBili  x   /  AST  39  /  ALT  59<H>  /  AlkPhos  86  08-17    Creatinine Trend: 0.63<--, 0.47<--, 0.52<--, 0.46<--            MICROBIOLOGY:

## 2022-08-18 NOTE — PROGRESS NOTE ADULT - PROBLEM SELECTOR PLAN 2
Patient with marked hyperglycemia, had been on insulin previously for 3 months in 2019. Currently endorsing emesis, nausea, and lack of appetite, however not consistent with DKA and may represent progression of chronic diabetes.    Home glipizide and metformin held while inpatient   BHB .5, not likely DKA given no acidemia and low BHB, A1C of 8.2, likely under-controlled on home regimen    - Mod-ISS - on 18u lantus with 8u premeal, however still requiring 20u insulin throughout last 24 hrs 8/17-8/18   - endo onboard Patient with marked hyperglycemia, had been on insulin previously for 3 months in 2019. Currently endorsing emesis, nausea, and lack of appetite, however not consistent with DKA and may represent progression of chronic diabetes.    Home glipizide and metformin held while inpatient   BHB .5, not likely DKA given no acidemia and low BHB, A1C of 8.2, likely under-controlled on home regimen    - Mod-ISS - on 18u lantus with 8u premeal, however still requiring 20u insulin throughout last 24 hrs 8/17-8/18   - endo onboard  - Insulin increase to 24u lantus and 11u premeal.

## 2022-08-18 NOTE — PROGRESS NOTE ADULT - SUBJECTIVE AND OBJECTIVE BOX
History: denies n/v.  reports good appetite.  Hard to ilicit history    MEDICATIONS  (STANDING):  chlordiazePOXIDE 50 milliGRAM(s) Oral every 12 hours  chlordiazePOXIDE   Oral   dextrose 5%. 1000 milliLiter(s) (50 mL/Hr) IV Continuous <Continuous>  dextrose 5%. 1000 milliLiter(s) (100 mL/Hr) IV Continuous <Continuous>  dextrose 50% Injectable 25 Gram(s) IV Push once  dextrose 50% Injectable 12.5 Gram(s) IV Push once  dextrose 50% Injectable 25 Gram(s) IV Push once  enoxaparin Injectable 40 milliGRAM(s) SubCutaneous every 24 hours  famotidine    Tablet 20 milliGRAM(s) Oral two times a day  glucagon  Injectable 1 milliGRAM(s) IntraMuscular once  insulin glargine Injectable (LANTUS) 18 Unit(s) SubCutaneous at bedtime  insulin lispro (ADMELOG) corrective regimen sliding scale   SubCutaneous three times a day before meals  insulin lispro (ADMELOG) corrective regimen sliding scale   SubCutaneous at bedtime  insulin lispro Injectable (ADMELOG) 8 Unit(s) SubCutaneous three times a day before meals  lisinopril 5 milliGRAM(s) Oral daily  melatonin 6 milliGRAM(s) Oral at bedtime  multivitamin 1 Tablet(s) Oral daily  tamsulosin 0.4 milliGRAM(s) Oral at bedtime  thiamine IVPB 500 milliGRAM(s) IV Intermittent daily    MEDICATIONS  (PRN):  dextrose Oral Gel 15 Gram(s) Oral once PRN Blood Glucose LESS THAN 70 milliGRAM(s)/deciliter      Allergies    No Known Allergies    Intolerances      Review of Systems:  Constitutional: No fever      ALL OTHER SYSTEMS REVIEWED AND NEGATIVE        Vital Signs Last 24 Hrs  T(C): 36.3 (18 Aug 2022 12:45), Max: 36.7 (17 Aug 2022 20:44)  T(F): 97.3 (18 Aug 2022 12:45), Max: 98 (17 Aug 2022 20:44)  HR: 101 (18 Aug 2022 12:45) (88 - 108)  BP: 120/86 (18 Aug 2022 12:45) (110/85 - 130/75)  BP(mean): --  RR: 18 (18 Aug 2022 12:45) (16 - 18)  SpO2: 100% (18 Aug 2022 12:45) (100% - 100%)    Parameters below as of 18 Aug 2022 12:45  Patient On (Oxygen Delivery Method): room air      GENERAL: NAD, well-developed  EYES: No proptosis, no lid lag, anicteric  SKIN: Dry, intact, No rashes or lesions  PSYCH: Alert and oriented x 3, normal affect, normal mood      CAPILLARY BLOOD GLUCOSE    POCT Blood Glucose.: 316 mg/dL (18 Aug 2022 17:56)  POCT Blood Glucose.: 306 mg/dL (18 Aug 2022 12:27)  POCT Blood Glucose.: 284 mg/dL (18 Aug 2022 08:34)  POCT Blood Glucose.: 264 mg/dL (17 Aug 2022 21:43)  POCT Blood Glucose.: 164 mg/dL (17 Aug 2022 18:15)  POCT Blood Glucose.: 350 mg/dL (17 Aug 2022 12:27)  POCT Blood Glucose.: 320 mg/dL (17 Aug 2022 08:33)  POCT Blood Glucose.: 337 mg/dL (16 Aug 2022 21:34)  POCT Blood Glucose.: 276 mg/dL (16 Aug 2022 17:41)      08-18    131<L>  |  97<L>  |  14  ----------------------------<  305<H>  4.0   |  21<L>  |  0.65    Ca    8.9      18 Aug 2022 06:21  Phos  4.3     08-18  Mg     2.10     08-18    TPro  6.8  /  Alb  4.0  /  TBili  0.3  /  DBili  x   /  AST  42<H>  /  ALT  64<H>  /  AlkPhos  68  08-18      A1C with Estimated Average Glucose (08.15.22 @ 10:02)    A1C with Estimated Average Glucose Result: 8.2 %    Estimated Average Glucose: 189

## 2022-08-19 LAB
ALBUMIN SERPL ELPH-MCNC: 3.9 G/DL — SIGNIFICANT CHANGE UP (ref 3.3–5)
ALP SERPL-CCNC: 73 U/L — SIGNIFICANT CHANGE UP (ref 40–120)
ALT FLD-CCNC: 65 U/L — HIGH (ref 4–41)
ANION GAP SERPL CALC-SCNC: 12 MMOL/L — SIGNIFICANT CHANGE UP (ref 7–14)
AST SERPL-CCNC: 31 U/L — SIGNIFICANT CHANGE UP (ref 4–40)
BILIRUB SERPL-MCNC: 0.3 MG/DL — SIGNIFICANT CHANGE UP (ref 0.2–1.2)
BUN SERPL-MCNC: 17 MG/DL — SIGNIFICANT CHANGE UP (ref 7–23)
CALCIUM SERPL-MCNC: 8.7 MG/DL — SIGNIFICANT CHANGE UP (ref 8.4–10.5)
CHLORIDE SERPL-SCNC: 96 MMOL/L — LOW (ref 98–107)
CO2 SERPL-SCNC: 23 MMOL/L — SIGNIFICANT CHANGE UP (ref 22–31)
CREAT SERPL-MCNC: 0.76 MG/DL — SIGNIFICANT CHANGE UP (ref 0.5–1.3)
EGFR: 107 ML/MIN/1.73M2 — SIGNIFICANT CHANGE UP
GLUCOSE SERPL-MCNC: 255 MG/DL — HIGH (ref 70–99)
HCT VFR BLD CALC: 42.3 % — SIGNIFICANT CHANGE UP (ref 39–50)
HGB BLD-MCNC: 14 G/DL — SIGNIFICANT CHANGE UP (ref 13–17)
MAGNESIUM SERPL-MCNC: 2 MG/DL — SIGNIFICANT CHANGE UP (ref 1.6–2.6)
MCHC RBC-ENTMCNC: 30.2 PG — SIGNIFICANT CHANGE UP (ref 27–34)
MCHC RBC-ENTMCNC: 33.1 GM/DL — SIGNIFICANT CHANGE UP (ref 32–36)
MCV RBC AUTO: 91.2 FL — SIGNIFICANT CHANGE UP (ref 80–100)
NRBC # BLD: 0 /100 WBCS — SIGNIFICANT CHANGE UP (ref 0–0)
NRBC # FLD: 0 K/UL — SIGNIFICANT CHANGE UP (ref 0–0)
PHOSPHATE SERPL-MCNC: 4.5 MG/DL — SIGNIFICANT CHANGE UP (ref 2.5–4.5)
PLATELET # BLD AUTO: 128 K/UL — LOW (ref 150–400)
POTASSIUM SERPL-MCNC: 4.3 MMOL/L — SIGNIFICANT CHANGE UP (ref 3.5–5.3)
POTASSIUM SERPL-SCNC: 4.3 MMOL/L — SIGNIFICANT CHANGE UP (ref 3.5–5.3)
PROT SERPL-MCNC: 6.7 G/DL — SIGNIFICANT CHANGE UP (ref 6–8.3)
RBC # BLD: 4.64 M/UL — SIGNIFICANT CHANGE UP (ref 4.2–5.8)
RBC # FLD: 12.5 % — SIGNIFICANT CHANGE UP (ref 10.3–14.5)
SODIUM SERPL-SCNC: 131 MMOL/L — LOW (ref 135–145)
WBC # BLD: 6.73 K/UL — SIGNIFICANT CHANGE UP (ref 3.8–10.5)
WBC # FLD AUTO: 6.73 K/UL — SIGNIFICANT CHANGE UP (ref 3.8–10.5)

## 2022-08-19 PROCEDURE — 99232 SBSQ HOSP IP/OBS MODERATE 35: CPT | Mod: GC

## 2022-08-19 PROCEDURE — 99232 SBSQ HOSP IP/OBS MODERATE 35: CPT

## 2022-08-19 RX ORDER — ENOXAPARIN SODIUM 100 MG/ML
1 INJECTION SUBCUTANEOUS
Qty: 1 | Refills: 0
Start: 2022-08-19 | End: 2022-08-19

## 2022-08-19 RX ORDER — INSULIN LISPRO 100/ML
1 VIAL (ML) SUBCUTANEOUS
Qty: 1 | Refills: 0
Start: 2022-08-19 | End: 2022-08-19

## 2022-08-19 RX ORDER — INSULIN LISPRO 100/ML
1 VIAL (ML) SUBCUTANEOUS
Qty: 1 | Refills: 0
Start: 2022-08-19

## 2022-08-19 RX ORDER — ACETAMINOPHEN 500 MG
1000 TABLET ORAL ONCE
Refills: 0 | Status: COMPLETED | OUTPATIENT
Start: 2022-08-19 | End: 2022-08-19

## 2022-08-19 RX ORDER — INSULIN LISPRO 100/ML
15 VIAL (ML) SUBCUTANEOUS
Refills: 0 | Status: DISCONTINUED | OUTPATIENT
Start: 2022-08-19 | End: 2022-08-20

## 2022-08-19 RX ORDER — INSULIN GLARGINE 100 [IU]/ML
30 INJECTION, SOLUTION SUBCUTANEOUS AT BEDTIME
Refills: 0 | Status: DISCONTINUED | OUTPATIENT
Start: 2022-08-19 | End: 2022-08-20

## 2022-08-19 RX ORDER — INSULIN GLARGINE 100 [IU]/ML
1 INJECTION, SOLUTION SUBCUTANEOUS
Qty: 1 | Refills: 0
Start: 2022-08-19 | End: 2022-08-19

## 2022-08-19 RX ADMIN — Medication 100 MILLIGRAM(S): at 12:37

## 2022-08-19 RX ADMIN — TAMSULOSIN HYDROCHLORIDE 0.4 MILLIGRAM(S): 0.4 CAPSULE ORAL at 21:55

## 2022-08-19 RX ADMIN — Medication 6: at 08:48

## 2022-08-19 RX ADMIN — Medication 15 UNIT(S): at 12:38

## 2022-08-19 RX ADMIN — Medication 1 TABLET(S): at 12:37

## 2022-08-19 RX ADMIN — Medication 15 UNIT(S): at 17:44

## 2022-08-19 RX ADMIN — Medication 6 MILLIGRAM(S): at 21:56

## 2022-08-19 RX ADMIN — INSULIN GLARGINE 30 UNIT(S): 100 INJECTION, SOLUTION SUBCUTANEOUS at 22:39

## 2022-08-19 RX ADMIN — Medication 4: at 12:37

## 2022-08-19 RX ADMIN — Medication 11 UNIT(S): at 08:49

## 2022-08-19 RX ADMIN — Medication 400 MILLIGRAM(S): at 14:20

## 2022-08-19 RX ADMIN — Medication 6: at 22:39

## 2022-08-19 RX ADMIN — LISINOPRIL 5 MILLIGRAM(S): 2.5 TABLET ORAL at 05:55

## 2022-08-19 RX ADMIN — FAMOTIDINE 20 MILLIGRAM(S): 10 INJECTION INTRAVENOUS at 05:55

## 2022-08-19 RX ADMIN — FAMOTIDINE 20 MILLIGRAM(S): 10 INJECTION INTRAVENOUS at 17:48

## 2022-08-19 RX ADMIN — Medication 1000 MILLIGRAM(S): at 14:50

## 2022-08-19 RX ADMIN — Medication 6: at 17:43

## 2022-08-19 RX ADMIN — ENOXAPARIN SODIUM 40 MILLIGRAM(S): 100 INJECTION SUBCUTANEOUS at 17:47

## 2022-08-19 NOTE — PROGRESS NOTE ADULT - SUBJECTIVE AND OBJECTIVE BOX
Chief Complaint: DM 2 with hyperglycemia    History: Patient seen at bedside. Patient alert, oriented to self and place. Stated correct month and year but was unsure of exact date  Reports he is eating meals, denies n/v, denies s/s of hypoglycemia  BG trending above target range - most recent 205 mg/dl  Patient endorses taking Metformin at home (850 mg, states he could not take the full dose), took insulin at some point (2019?)    MEDICATIONS  (STANDING):  dextrose 5%. 1000 milliLiter(s) (50 mL/Hr) IV Continuous <Continuous>  dextrose 5%. 1000 milliLiter(s) (100 mL/Hr) IV Continuous <Continuous>  dextrose 50% Injectable 25 Gram(s) IV Push once  dextrose 50% Injectable 12.5 Gram(s) IV Push once  dextrose 50% Injectable 25 Gram(s) IV Push once  enoxaparin Injectable 40 milliGRAM(s) SubCutaneous every 24 hours  famotidine    Tablet 20 milliGRAM(s) Oral two times a day  glucagon  Injectable 1 milliGRAM(s) IntraMuscular once  insulin glargine Injectable (LANTUS) 30 Unit(s) SubCutaneous at bedtime  insulin lispro (ADMELOG) corrective regimen sliding scale   SubCutaneous three times a day before meals  insulin lispro (ADMELOG) corrective regimen sliding scale   SubCutaneous at bedtime  insulin lispro Injectable (ADMELOG) 15 Unit(s) SubCutaneous three times a day before meals  lisinopril 5 milliGRAM(s) Oral daily  melatonin 6 milliGRAM(s) Oral at bedtime  multivitamin 1 Tablet(s) Oral daily  tamsulosin 0.4 milliGRAM(s) Oral at bedtime  thiamine 100 milliGRAM(s) Oral daily    MEDICATIONS  (PRN):  dextrose Oral Gel 15 Gram(s) Oral once PRN Blood Glucose LESS THAN 70 milliGRAM(s)/deciliter    No Known Allergies    Review of Systems:  Cardiovascular: No chest pain  Respiratory: No SOB  GI: No nausea, vomiting  Endocrine: no hypoglycemia     PHYSICAL EXAM:  VITALS: T(C): 36.4 (08-19-22 @ 12:21)  T(F): 97.5 (08-19-22 @ 12:21), Max: 97.8 (08-18-22 @ 21:18)  HR: 86 (08-19-22 @ 12:21) (84 - 96)  BP: 117/83 (08-19-22 @ 12:21) (111/70 - 124/82)  RR:  (17 - 18)  SpO2:  (100% - 100%)  Wt(kg): --  GENERAL: NAD  EYES: No proptosis, no lid lag, anicteric  HEENT:  Atraumatic, Normocephalic, moist mucous membranes  RESPIRATORY: unlabored respirations   PSYCH: Alert, oriented to self, place    CAPILLARY BLOOD GLUCOSE    POCT Blood Glucose.: 205 mg/dL (19 Aug 2022 12:18)  POCT Blood Glucose.: 269 mg/dL (19 Aug 2022 08:28)  POCT Blood Glucose.: 315 mg/dL (18 Aug 2022 21:43)  POCT Blood Glucose.: 316 mg/dL (18 Aug 2022 17:56)      08-19    131<L>  |  96<L>  |  17  ----------------------------<  255<H>  4.3   |  23  |  0.76    eGFR: 107    Ca    8.7      08-19  Mg     2.00     08-19  Phos  4.5     08-19    TPro  6.7  /  Alb  3.9  /  TBili  0.3  /  DBili  x   /  AST  31  /  ALT  65<H>  /  AlkPhos  73  08-19      A1C with Estimated Average Glucose Result: 8.2 % (08-15-22 @ 10:02)    Diet, Regular:   Consistent Carbohydrate Evening Snack (CSTCHOSN) (08-16-22 @ 08:39) [Active]

## 2022-08-19 NOTE — DIETITIAN INITIAL EVALUATION ADULT - PERTINENT LABORATORY DATA
08-19    131<L>  |  96<L>  |  17  ----------------------------<  255<H>  4.3   |  23  |  0.76    Ca    8.7      19 Aug 2022 06:10  Phos  4.5     08-19  Mg     2.00     08-19    TPro  6.7  /  Alb  3.9  /  TBili  0.3  /  DBili  x   /  AST  31  /  ALT  65<H>  /  AlkPhos  73  08-19  POCT Blood Glucose.: 205 mg/dL (08-19-22 @ 12:18)  A1C with Estimated Average Glucose Result: 8.2 % (08-15-22 @ 10:02)

## 2022-08-19 NOTE — DIETITIAN INITIAL EVALUATION ADULT - PERTINENT MEDS FT
MEDICATIONS  (STANDING):  dextrose 5%. 1000 milliLiter(s) (50 mL/Hr) IV Continuous <Continuous>  dextrose 5%. 1000 milliLiter(s) (100 mL/Hr) IV Continuous <Continuous>  dextrose 50% Injectable 25 Gram(s) IV Push once  dextrose 50% Injectable 12.5 Gram(s) IV Push once  dextrose 50% Injectable 25 Gram(s) IV Push once  enoxaparin Injectable 40 milliGRAM(s) SubCutaneous every 24 hours  famotidine    Tablet 20 milliGRAM(s) Oral two times a day  glucagon  Injectable 1 milliGRAM(s) IntraMuscular once  insulin glargine Injectable (LANTUS) 30 Unit(s) SubCutaneous at bedtime  insulin lispro (ADMELOG) corrective regimen sliding scale   SubCutaneous three times a day before meals  insulin lispro (ADMELOG) corrective regimen sliding scale   SubCutaneous at bedtime  insulin lispro Injectable (ADMELOG) 15 Unit(s) SubCutaneous three times a day before meals  lisinopril 5 milliGRAM(s) Oral daily  melatonin 6 milliGRAM(s) Oral at bedtime  multivitamin 1 Tablet(s) Oral daily  tamsulosin 0.4 milliGRAM(s) Oral at bedtime  thiamine 100 milliGRAM(s) Oral daily    MEDICATIONS  (PRN):  dextrose Oral Gel 15 Gram(s) Oral once PRN Blood Glucose LESS THAN 70 milliGRAM(s)/deciliter

## 2022-08-19 NOTE — DIETITIAN INITIAL EVALUATION ADULT - NSICDXPASTMEDICALHX_GEN_ALL_CORE_FT
1-2 cups/cans per day PAST MEDICAL HISTORY:  Alcohol withdrawal with inpatient treatment, uncomplicated     High blood pressure By pt report; does not use any medications as of today (3/7/18)    Hyperlipidemia

## 2022-08-19 NOTE — PROVIDER CONTACT NOTE (OTHER) - ASSESSMENT
T 98 RR 18 /98 oxygen sat %; not in distress, alert and responsive  (re checked 108) T 98 RR 18 /98 oxygen sat %; not in distress, alert and responsive

## 2022-08-19 NOTE — PROGRESS NOTE ADULT - SUBJECTIVE AND OBJECTIVE BOX
INCOMPLETE NOTE    Juliocesar Schultz | PGY1| Pager: [phone]  Interval Events:    REVIEW OF SYSTEMS:  CONSTITUTIONAL: No weakness, fevers or chills  EYES/ENT: No visual changes;  No vertigo or throat pain   NECK: No pain or stiffness  RESPIRATORY: No cough, wheezing, hemoptysis; No shortness of breath  CARDIOVASCULAR: No chest pain or palpitations  GASTROINTESTINAL: No abdominal or epigastric pain. No nausea, vomiting, or hematemesis; No diarrhea or constipation. No melena or hematochezia.  GENITOURINARY: No dysuria, frequency or hematuria  NEUROLOGICAL: No numbness or weakness  SKIN: No itching, burning, rashes, or lesions   All other review of systems is negative unless indicated above.    OBJECTIVE:  ICU Vital Signs Last 24 Hrs  T(C): 36.4 (19 Aug 2022 05:05), Max: 36.6 (18 Aug 2022 08:45)  T(F): 97.5 (19 Aug 2022 05:05), Max: 97.8 (18 Aug 2022 08:45)  HR: 84 (19 Aug 2022 05:05) (84 - 108)  BP: 111/70 (19 Aug 2022 05:05) (111/70 - 124/82)  BP(mean): --  ABP: --  ABP(mean): --  RR: 18 (19 Aug 2022 05:05) (17 - 18)  SpO2: 100% (19 Aug 2022 05:05) (100% - 100%)    O2 Parameters below as of 19 Aug 2022 05:05  Patient On (Oxygen Delivery Method): room air              CAPILLARY BLOOD GLUCOSE      POCT Blood Glucose.: 315 mg/dL (18 Aug 2022 21:43)      PHYSICAL EXAM:  General: WN/WD NAD  Neurology: A&Ox3, nonfocal, HAMM x 4  Eyes: PERRLA/ EOMI, Gross vision intact  ENT/Neck: Neck supple, trachea midline, No JVD, Gross hearing intact  Respiratory: CTA B/L, No wheezing, rales, rhonchi  CV: RRR, +S1/S2, -S3/S4, no murmurs, rubs or gallops  Abdominal: Soft, NT, ND +BS, No HSM  MSK: 5/5 strength UE/LE bilaterally  Extremities: No edema, 2+ peripheral pulses  Skin: No Rashes, Hematoma, Ecchymosis  Incisions:   Tubes:    HOSPITAL MEDICATIONS:  MEDICATIONS  (STANDING):  dextrose 5%. 1000 milliLiter(s) (50 mL/Hr) IV Continuous <Continuous>  dextrose 5%. 1000 milliLiter(s) (100 mL/Hr) IV Continuous <Continuous>  dextrose 50% Injectable 25 Gram(s) IV Push once  dextrose 50% Injectable 12.5 Gram(s) IV Push once  dextrose 50% Injectable 25 Gram(s) IV Push once  enoxaparin Injectable 40 milliGRAM(s) SubCutaneous every 24 hours  famotidine    Tablet 20 milliGRAM(s) Oral two times a day  glucagon  Injectable 1 milliGRAM(s) IntraMuscular once  insulin glargine Injectable (LANTUS) 24 Unit(s) SubCutaneous at bedtime  insulin lispro (ADMELOG) corrective regimen sliding scale   SubCutaneous three times a day before meals  insulin lispro (ADMELOG) corrective regimen sliding scale   SubCutaneous at bedtime  insulin lispro Injectable (ADMELOG) 11 Unit(s) SubCutaneous three times a day before meals  lisinopril 5 milliGRAM(s) Oral daily  melatonin 6 milliGRAM(s) Oral at bedtime  multivitamin 1 Tablet(s) Oral daily  tamsulosin 0.4 milliGRAM(s) Oral at bedtime  thiamine 100 milliGRAM(s) Oral daily    MEDICATIONS  (PRN):  dextrose Oral Gel 15 Gram(s) Oral once PRN Blood Glucose LESS THAN 70 milliGRAM(s)/deciliter      LABS:                        14.0   6.73  )-----------( 128      ( 19 Aug 2022 06:10 )             42.3     Hgb Trend: 14.0<--, 14.9<--, 16.1<--, 13.8<--, 14.3<--  08-18    131<L>  |  97<L>  |  14  ----------------------------<  305<H>  4.0   |  21<L>  |  0.65    Ca    8.9      18 Aug 2022 06:21  Phos  4.3     08-18  Mg     2.10     08-18    TPro  6.8  /  Alb  4.0  /  TBili  0.3  /  DBili  x   /  AST  42<H>  /  ALT  64<H>  /  AlkPhos  68  08-18    Creatinine Trend: 0.65<--, 0.63<--, 0.47<--, 0.52<--, 0.46<--            MICROBIOLOGY:      Juliocesar Marion | PGY1| Pager: [phone]  Interval Events: Finished librium taper yesterday evening, reporting more fatigue today, less vocal than previously, still with poor insight/judgment. CIWAs now 2-3    REVIEW OF SYSTEMS:    All other review of systems is negative unless indicated above.    OBJECTIVE:  ICU Vital Signs Last 24 Hrs  T(C): 36.4 (19 Aug 2022 05:05), Max: 36.6 (18 Aug 2022 08:45)  T(F): 97.5 (19 Aug 2022 05:05), Max: 97.8 (18 Aug 2022 08:45)  HR: 84 (19 Aug 2022 05:05) (84 - 108)  BP: 111/70 (19 Aug 2022 05:05) (111/70 - 124/82)  BP(mean): --  ABP: --  ABP(mean): --  RR: 18 (19 Aug 2022 05:05) (17 - 18)  SpO2: 100% (19 Aug 2022 05:05) (100% - 100%)    O2 Parameters below as of 19 Aug 2022 05:05  Patient On (Oxygen Delivery Method): room air      CAPILLARY BLOOD GLUCOSE      POCT Blood Glucose.: 315 mg/dL (18 Aug 2022 21:43)      PHYSICAL EXAM:  General: WN/WD NAD, tired appearing gentleman, sleeping comfortably at initiation of interview, awakes to verbal stimuli, less tremulous  Neurology: A&Ox3, nonfocal, HAMM x 4  Eyes: PERRLA/ EOMI, Gross vision intact  ENT/Neck: Gross hearing intact  Respiratory: CTA B/L, No wheezing, rales, rhonchi  CV: RRR, +S1/S2, -S3/S4, no murmurs, rubs or gallops  Abdominal: Soft, NT, ND +BS, No HSM  MSK: 5/5 strength UE/LE bilaterally  Extremities: No edema, 2+ peripheral pulses  Skin: No Rashes, Hematoma, Ecchymosis  Incisions:   Tubes:    HOSPITAL MEDICATIONS:  MEDICATIONS  (STANDING):  dextrose 5%. 1000 milliLiter(s) (50 mL/Hr) IV Continuous <Continuous>  dextrose 5%. 1000 milliLiter(s) (100 mL/Hr) IV Continuous <Continuous>  dextrose 50% Injectable 25 Gram(s) IV Push once  dextrose 50% Injectable 12.5 Gram(s) IV Push once  dextrose 50% Injectable 25 Gram(s) IV Push once  enoxaparin Injectable 40 milliGRAM(s) SubCutaneous every 24 hours  famotidine    Tablet 20 milliGRAM(s) Oral two times a day  glucagon  Injectable 1 milliGRAM(s) IntraMuscular once  insulin glargine Injectable (LANTUS) 24 Unit(s) SubCutaneous at bedtime  insulin lispro (ADMELOG) corrective regimen sliding scale   SubCutaneous three times a day before meals  insulin lispro (ADMELOG) corrective regimen sliding scale   SubCutaneous at bedtime  insulin lispro Injectable (ADMELOG) 11 Unit(s) SubCutaneous three times a day before meals  lisinopril 5 milliGRAM(s) Oral daily  melatonin 6 milliGRAM(s) Oral at bedtime  multivitamin 1 Tablet(s) Oral daily  tamsulosin 0.4 milliGRAM(s) Oral at bedtime  thiamine 100 milliGRAM(s) Oral daily    MEDICATIONS  (PRN):  dextrose Oral Gel 15 Gram(s) Oral once PRN Blood Glucose LESS THAN 70 milliGRAM(s)/deciliter      LABS:                        14.0   6.73  )-----------( 128      ( 19 Aug 2022 06:10 )             42.3     Hgb Trend: 14.0<--, 14.9<--, 16.1<--, 13.8<--, 14.3<--  08-18    131<L>  |  97<L>  |  14  ----------------------------<  305<H>  4.0   |  21<L>  |  0.65    Ca    8.9      18 Aug 2022 06:21  Phos  4.3     08-18  Mg     2.10     08-18    TPro  6.8  /  Alb  4.0  /  TBili  0.3  /  DBili  x   /  AST  42<H>  /  ALT  64<H>  /  AlkPhos  68  08-18    Creatinine Trend: 0.65<--, 0.63<--, 0.47<--, 0.52<--, 0.46<--            MICROBIOLOGY:

## 2022-08-19 NOTE — DIETITIAN INITIAL EVALUATION ADULT - OTHER INFO
51y Male complaining of weakness, A&Ox3, PMH of ETOH abuse, HLD, HTN, DM2, DTS seizures, respirations are even and unlabored, sating at 100% on RA, 20 G to R forearm placed, labs sent. Pt appears tremulous with nausea and , has not had a drink in past 24 hours. CT being performed at this time.    Pt reports fair appetite with No GI distress (nausea/vomiting/diarrhea/constipation.) at present. Pt not sure of UBW. skin intact with no edema per nursing flow sheet. Labs reviewed.  A1c- 8.2% shows uncontrolled DM.  RD provided the patient with extensive verbal and written DM diet education; including, carb counting, label reading, meal planning, pre-prandial and post-prandial finger stick goals, and HbA1c goal. Patient was also made aware of the physiological implications of poor glycemic control. Also discussed Heart Healthy diet recommendations. Importance of having consistent carbohydrate with protein at each meals emphasized.

## 2022-08-19 NOTE — DIETITIAN INITIAL EVALUATION ADULT - ORAL INTAKE PTA/DIET HISTORY
Pt reports fair appetite at home but with alcohol abuse. Pt knows CHO consistent diet but was not compliant to it pta.

## 2022-08-19 NOTE — PROVIDER CONTACT NOTE (OTHER) - ACTION/TREATMENT ORDERED:
Provider aware; give librium taper dose as per orders.
nursing care to continue. insulin administered.
no interventions ordered at this time. nursing care to continue.
MD made aware, no new orders, continue to monitor

## 2022-08-19 NOTE — PROGRESS NOTE ADULT - PROBLEM SELECTOR PLAN 2
Patient endorsing 1pt/day rum and beer, previous hospitalizations for alcohol withdrawal with questionable DTs. Elevated EtOH in ED, started on CIWA initially at 9, 4/5 on 8/16 5s on 8/18 3-->2s 8/19. No active signs of DTs    - Finished Librium taper in setting of national lorazepam shortage, required one dose of ativan .5 8/16 evening, also attempting to leave AMA, however lacks capacity related to his diabetes management as well as alcohol withdrawal.   - daily multivitamin  - would benefit from alcohol cessation counseling (SBIRT) - recommending f/u: Addiction Services at [facility]: [address], [address] [place] [address] p: [phone], [facility] Crisis walk in clinic p:[phone],

## 2022-08-19 NOTE — PROVIDER CONTACT NOTE (OTHER) - BACKGROUND
52 y/o male admitted for alcohol dependence with withdrawal with hx of HLD, high blood pressure, BPH
patient admitted for etoh withdrawal. pmh includes dm
patient admitted for etoh withdrawal
Patient presented with body tremors. ETOH Use.

## 2022-08-19 NOTE — PROVIDER CONTACT NOTE (OTHER) - RECOMMENDATIONS
continue to monitor
nursing care to continue.
Due for Librium Taper Dose
insulin sliding scale and premeal insulin to be administered.

## 2022-08-20 LAB
ALBUMIN SERPL ELPH-MCNC: 3.9 G/DL — SIGNIFICANT CHANGE UP (ref 3.3–5)
ALP SERPL-CCNC: 88 U/L — SIGNIFICANT CHANGE UP (ref 40–120)
ALT FLD-CCNC: 62 U/L — HIGH (ref 4–41)
ANION GAP SERPL CALC-SCNC: 12 MMOL/L — SIGNIFICANT CHANGE UP (ref 7–14)
AST SERPL-CCNC: 26 U/L — SIGNIFICANT CHANGE UP (ref 4–40)
BILIRUB SERPL-MCNC: 0.2 MG/DL — SIGNIFICANT CHANGE UP (ref 0.2–1.2)
BUN SERPL-MCNC: 16 MG/DL — SIGNIFICANT CHANGE UP (ref 7–23)
CALCIUM SERPL-MCNC: 8.8 MG/DL — SIGNIFICANT CHANGE UP (ref 8.4–10.5)
CHLORIDE SERPL-SCNC: 97 MMOL/L — LOW (ref 98–107)
CHOLEST SERPL-MCNC: 243 MG/DL — HIGH
CO2 SERPL-SCNC: 23 MMOL/L — SIGNIFICANT CHANGE UP (ref 22–31)
CREAT SERPL-MCNC: 0.65 MG/DL — SIGNIFICANT CHANGE UP (ref 0.5–1.3)
EGFR: 113 ML/MIN/1.73M2 — SIGNIFICANT CHANGE UP
GLUCOSE SERPL-MCNC: 293 MG/DL — HIGH (ref 70–99)
HCT VFR BLD CALC: 39.5 % — SIGNIFICANT CHANGE UP (ref 39–50)
HDLC SERPL-MCNC: 38 MG/DL — LOW
HGB BLD-MCNC: 13.7 G/DL — SIGNIFICANT CHANGE UP (ref 13–17)
LIPID PNL WITH DIRECT LDL SERPL: SIGNIFICANT CHANGE UP MG/DL
MAGNESIUM SERPL-MCNC: 1.9 MG/DL — SIGNIFICANT CHANGE UP (ref 1.6–2.6)
MCHC RBC-ENTMCNC: 31.5 PG — SIGNIFICANT CHANGE UP (ref 27–34)
MCHC RBC-ENTMCNC: 34.7 GM/DL — SIGNIFICANT CHANGE UP (ref 32–36)
MCV RBC AUTO: 90.8 FL — SIGNIFICANT CHANGE UP (ref 80–100)
NON HDL CHOLESTEROL: 205 MG/DL — HIGH
NRBC # BLD: 0 /100 WBCS — SIGNIFICANT CHANGE UP (ref 0–0)
NRBC # FLD: 0 K/UL — SIGNIFICANT CHANGE UP (ref 0–0)
PHOSPHATE SERPL-MCNC: 4.1 MG/DL — SIGNIFICANT CHANGE UP (ref 2.5–4.5)
PLATELET # BLD AUTO: 131 K/UL — LOW (ref 150–400)
POTASSIUM SERPL-MCNC: 4.1 MMOL/L — SIGNIFICANT CHANGE UP (ref 3.5–5.3)
POTASSIUM SERPL-SCNC: 4.1 MMOL/L — SIGNIFICANT CHANGE UP (ref 3.5–5.3)
PROT SERPL-MCNC: 6.5 G/DL — SIGNIFICANT CHANGE UP (ref 6–8.3)
RBC # BLD: 4.35 M/UL — SIGNIFICANT CHANGE UP (ref 4.2–5.8)
RBC # FLD: 12.8 % — SIGNIFICANT CHANGE UP (ref 10.3–14.5)
SODIUM SERPL-SCNC: 132 MMOL/L — LOW (ref 135–145)
TRIGL SERPL-MCNC: 528 MG/DL — HIGH
WBC # BLD: 6.56 K/UL — SIGNIFICANT CHANGE UP (ref 3.8–10.5)
WBC # FLD AUTO: 6.56 K/UL — SIGNIFICANT CHANGE UP (ref 3.8–10.5)

## 2022-08-20 PROCEDURE — 99232 SBSQ HOSP IP/OBS MODERATE 35: CPT | Mod: GC

## 2022-08-20 PROCEDURE — 70548 MR ANGIOGRAPHY NECK W/DYE: CPT | Mod: 26

## 2022-08-20 PROCEDURE — 70544 MR ANGIOGRAPHY HEAD W/O DYE: CPT | Mod: 26,59

## 2022-08-20 PROCEDURE — 99232 SBSQ HOSP IP/OBS MODERATE 35: CPT

## 2022-08-20 PROCEDURE — 70553 MRI BRAIN STEM W/O & W/DYE: CPT | Mod: 26

## 2022-08-20 RX ORDER — INSULIN LISPRO 100/ML
18 VIAL (ML) SUBCUTANEOUS
Refills: 0 | Status: DISCONTINUED | OUTPATIENT
Start: 2022-08-20 | End: 2022-08-20

## 2022-08-20 RX ORDER — INSULIN LISPRO 100/ML
18 VIAL (ML) SUBCUTANEOUS ONCE
Refills: 0 | Status: COMPLETED | OUTPATIENT
Start: 2022-08-20 | End: 2022-08-20

## 2022-08-20 RX ORDER — INSULIN LISPRO 100/ML
20 VIAL (ML) SUBCUTANEOUS
Refills: 0 | Status: DISCONTINUED | OUTPATIENT
Start: 2022-08-20 | End: 2022-08-21

## 2022-08-20 RX ORDER — INSULIN GLARGINE 100 [IU]/ML
35 INJECTION, SOLUTION SUBCUTANEOUS AT BEDTIME
Refills: 0 | Status: DISCONTINUED | OUTPATIENT
Start: 2022-08-20 | End: 2022-08-20

## 2022-08-20 RX ORDER — ATORVASTATIN CALCIUM 80 MG/1
40 TABLET, FILM COATED ORAL AT BEDTIME
Refills: 0 | Status: DISCONTINUED | OUTPATIENT
Start: 2022-08-20 | End: 2022-08-21

## 2022-08-20 RX ORDER — ACETAMINOPHEN 500 MG
1000 TABLET ORAL ONCE
Refills: 0 | Status: COMPLETED | OUTPATIENT
Start: 2022-08-20 | End: 2022-08-20

## 2022-08-20 RX ORDER — INSULIN GLARGINE 100 [IU]/ML
40 INJECTION, SOLUTION SUBCUTANEOUS AT BEDTIME
Refills: 0 | Status: DISCONTINUED | OUTPATIENT
Start: 2022-08-20 | End: 2022-08-21

## 2022-08-20 RX ADMIN — TAMSULOSIN HYDROCHLORIDE 0.4 MILLIGRAM(S): 0.4 CAPSULE ORAL at 21:43

## 2022-08-20 RX ADMIN — LISINOPRIL 5 MILLIGRAM(S): 2.5 TABLET ORAL at 05:45

## 2022-08-20 RX ADMIN — Medication 6 MILLIGRAM(S): at 21:43

## 2022-08-20 RX ADMIN — Medication 8: at 12:43

## 2022-08-20 RX ADMIN — Medication 1000 MILLIGRAM(S): at 18:44

## 2022-08-20 RX ADMIN — Medication 6: at 09:01

## 2022-08-20 RX ADMIN — FAMOTIDINE 20 MILLIGRAM(S): 10 INJECTION INTRAVENOUS at 05:45

## 2022-08-20 RX ADMIN — Medication 18 UNIT(S): at 12:43

## 2022-08-20 RX ADMIN — Medication 100 MILLIGRAM(S): at 12:43

## 2022-08-20 RX ADMIN — FAMOTIDINE 20 MILLIGRAM(S): 10 INJECTION INTRAVENOUS at 18:36

## 2022-08-20 RX ADMIN — Medication 4: at 18:33

## 2022-08-20 RX ADMIN — Medication 18 UNIT(S): at 09:02

## 2022-08-20 RX ADMIN — ATORVASTATIN CALCIUM 40 MILLIGRAM(S): 80 TABLET, FILM COATED ORAL at 21:43

## 2022-08-20 RX ADMIN — INSULIN GLARGINE 40 UNIT(S): 100 INJECTION, SOLUTION SUBCUTANEOUS at 22:00

## 2022-08-20 RX ADMIN — Medication 1 TABLET(S): at 12:43

## 2022-08-20 RX ADMIN — Medication 400 MILLIGRAM(S): at 18:29

## 2022-08-20 RX ADMIN — Medication 20 UNIT(S): at 18:33

## 2022-08-20 RX ADMIN — ENOXAPARIN SODIUM 40 MILLIGRAM(S): 100 INJECTION SUBCUTANEOUS at 18:34

## 2022-08-20 NOTE — PROGRESS NOTE ADULT - PROBLEM SELECTOR PLAN 2
Patient endorsing 1pt/day rum and beer, previous hospitalizations for alcohol withdrawal with questionable DTs. Elevated EtOH in ED, started on CIWA initially at 9, 4/5 on 8/16 5s on 8/18 3-->2s 8/19. No active signs of DTs    - Finished Librium taper in setting of national lorazepam shortage, required one dose of ativan .5 8/16 evening, also attempting to leave AMA, however lacks capacity related to his diabetes management as well as alcohol withdrawal.   - daily multivitamin  - would benefit from alcohol cessation counseling (SBIRT) - recommending f/u: Addiction Services at [facility]: [address], [address] [place] [address] p: [phone], [facility] Crisis walk in clinic p:[phone],

## 2022-08-20 NOTE — PROGRESS NOTE ADULT - SUBJECTIVE AND OBJECTIVE BOX
Chief Complaint: DM 2 with hyperglycemia     History: Patient seen at bedside at lunchtime. Patient reports he ate food from home for lunch, PRIOR to lunch FS - elevated to 326 mg/dl  Patient also endorses drinking fruit juice today, not sugar free    MEDICATIONS  (STANDING):  atorvastatin 40 milliGRAM(s) Oral at bedtime  dextrose 5%. 1000 milliLiter(s) (50 mL/Hr) IV Continuous <Continuous>  dextrose 5%. 1000 milliLiter(s) (100 mL/Hr) IV Continuous <Continuous>  dextrose 50% Injectable 25 Gram(s) IV Push once  dextrose 50% Injectable 12.5 Gram(s) IV Push once  dextrose 50% Injectable 25 Gram(s) IV Push once  enoxaparin Injectable 40 milliGRAM(s) SubCutaneous every 24 hours  famotidine    Tablet 20 milliGRAM(s) Oral two times a day  glucagon  Injectable 1 milliGRAM(s) IntraMuscular once  insulin glargine Injectable (LANTUS) 35 Unit(s) SubCutaneous at bedtime  insulin lispro (ADMELOG) corrective regimen sliding scale   SubCutaneous three times a day before meals  insulin lispro (ADMELOG) corrective regimen sliding scale   SubCutaneous at bedtime  insulin lispro Injectable (ADMELOG) 18 Unit(s) SubCutaneous three times a day before meals  lisinopril 5 milliGRAM(s) Oral daily  melatonin 6 milliGRAM(s) Oral at bedtime  multivitamin 1 Tablet(s) Oral daily  tamsulosin 0.4 milliGRAM(s) Oral at bedtime  thiamine 100 milliGRAM(s) Oral daily    MEDICATIONS  (PRN):  acetaminophen   IVPB .. 1000 milliGRAM(s) IV Intermittent once PRN Mild Pain (1 - 3)  dextrose Oral Gel 15 Gram(s) Oral once PRN Blood Glucose LESS THAN 70 milliGRAM(s)/deciliter    No Known Allergies    Review of Systems:  Cardiovascular: No chest pain  Respiratory: No SOB  GI: No nausea, vomiting  Endocrine: no hypoglycemia     PHYSICAL EXAM:  VITALS: T(C): 36.6 (08-20-22 @ 13:05)  T(F): 97.9 (08-20-22 @ 13:05), Max: 98 (08-19-22 @ 21:55)  HR: 98 (08-20-22 @ 13:05) (90 - 113)  BP: 121/- (08-20-22 @ 13:05) (110/80 - 140/98)  RR:  (17 - 18)  SpO2:  (100% - 100%)  Wt(kg): --  GENERAL: NAD  EYES: No proptosis, no lid lag, anicteric  HEENT:  Atraumatic, Normocephalic, moist mucous membranes  RESPIRATORY: unlabored respirations     CAPILLARY BLOOD GLUCOSE    POCT Blood Glucose.: 326 mg/dL (20 Aug 2022 12:38)  POCT Blood Glucose.: 275 mg/dL (20 Aug 2022 08:25)  POCT Blood Glucose.: 380 mg/dL (19 Aug 2022 22:22)  POCT Blood Glucose.: 279 mg/dL (19 Aug 2022 17:38)      08-20    132<L>  |  97<L>  |  16  ----------------------------<  293<H>  4.1   |  23  |  0.65    eGFR: 113    Ca    8.8      08-20  Mg     1.90     08-20  Phos  4.1     08-20    TPro  6.5  /  Alb  3.9  /  TBili  0.2  /  DBili  x   /  AST  26  /  ALT  62<H>  /  AlkPhos  88  08-20      A1C with Estimated Average Glucose Result: 8.2 % (08-15-22 @ 10:02)    Diet, Regular:   Consistent Carbohydrate Evening Snack (CSTCHOSN) (08-16-22 @ 08:39) [Active]

## 2022-08-20 NOTE — PROGRESS NOTE ADULT - SUBJECTIVE AND OBJECTIVE BOX
INCOMPLETE NOTE    Juliocesar Josecolemanmariposa | PGY1| Pager: [phone]  Interval Events:    REVIEW OF SYSTEMS:  CONSTITUTIONAL: No weakness, fevers or chills  EYES/ENT: No visual changes;  No vertigo or throat pain   NECK: No pain or stiffness  RESPIRATORY: No cough, wheezing, hemoptysis; No shortness of breath  CARDIOVASCULAR: No chest pain or palpitations  GASTROINTESTINAL: No abdominal or epigastric pain. No nausea, vomiting, or hematemesis; No diarrhea or constipation. No melena or hematochezia.  GENITOURINARY: No dysuria, frequency or hematuria  NEUROLOGICAL: No numbness or weakness  SKIN: No itching, burning, rashes, or lesions   All other review of systems is negative unless indicated above.    OBJECTIVE:  ICU Vital Signs Last 24 Hrs  T(C): 36.6 (20 Aug 2022 04:55), Max: 36.7 (19 Aug 2022 21:55)  T(F): 97.8 (20 Aug 2022 04:55), Max: 98 (19 Aug 2022 21:55)  HR: 90 (20 Aug 2022 04:55) (86 - 113)  BP: 110/80 (20 Aug 2022 04:55) (110/80 - 140/98)  BP(mean): --  ABP: --  ABP(mean): --  RR: 18 (20 Aug 2022 04:55) (17 - 18)  SpO2: 100% (20 Aug 2022 04:55) (100% - 100%)    O2 Parameters below as of 20 Aug 2022 04:55  Patient On (Oxygen Delivery Method): room air              CAPILLARY BLOOD GLUCOSE      POCT Blood Glucose.: 380 mg/dL (19 Aug 2022 22:22)      PHYSICAL EXAM:  General: WN/WD NAD  Neurology: A&Ox3, nonfocal, HAMM x 4  Eyes: PERRLA/ EOMI, Gross vision intact  ENT/Neck: Neck supple, trachea midline, No JVD, Gross hearing intact  Respiratory: CTA B/L, No wheezing, rales, rhonchi  CV: RRR, +S1/S2, -S3/S4, no murmurs, rubs or gallops  Abdominal: Soft, NT, ND +BS, No HSM  MSK: 5/5 strength UE/LE bilaterally  Extremities: No edema, 2+ peripheral pulses  Skin: No Rashes, Hematoma, Ecchymosis  Incisions:   Tubes:    HOSPITAL MEDICATIONS:  MEDICATIONS  (STANDING):  dextrose 5%. 1000 milliLiter(s) (50 mL/Hr) IV Continuous <Continuous>  dextrose 5%. 1000 milliLiter(s) (100 mL/Hr) IV Continuous <Continuous>  dextrose 50% Injectable 25 Gram(s) IV Push once  dextrose 50% Injectable 12.5 Gram(s) IV Push once  dextrose 50% Injectable 25 Gram(s) IV Push once  enoxaparin Injectable 40 milliGRAM(s) SubCutaneous every 24 hours  famotidine    Tablet 20 milliGRAM(s) Oral two times a day  glucagon  Injectable 1 milliGRAM(s) IntraMuscular once  insulin glargine Injectable (LANTUS) 30 Unit(s) SubCutaneous at bedtime  insulin lispro (ADMELOG) corrective regimen sliding scale   SubCutaneous three times a day before meals  insulin lispro (ADMELOG) corrective regimen sliding scale   SubCutaneous at bedtime  insulin lispro Injectable (ADMELOG) 15 Unit(s) SubCutaneous three times a day before meals  lisinopril 5 milliGRAM(s) Oral daily  melatonin 6 milliGRAM(s) Oral at bedtime  multivitamin 1 Tablet(s) Oral daily  tamsulosin 0.4 milliGRAM(s) Oral at bedtime  thiamine 100 milliGRAM(s) Oral daily    MEDICATIONS  (PRN):  dextrose Oral Gel 15 Gram(s) Oral once PRN Blood Glucose LESS THAN 70 milliGRAM(s)/deciliter      LABS:                        14.0   6.73  )-----------( 128      ( 19 Aug 2022 06:10 )             42.3     Hgb Trend: 14.0<--, 14.9<--, 16.1<--, 13.8<--, 14.3<--  08-19    131<L>  |  96<L>  |  17  ----------------------------<  255<H>  4.3   |  23  |  0.76    Ca    8.7      19 Aug 2022 06:10  Phos  4.5     08-19  Mg     2.00     08-19    TPro  6.7  /  Alb  3.9  /  TBili  0.3  /  DBili  x   /  AST  31  /  ALT  65<H>  /  AlkPhos  73  08-19    Creatinine Trend: 0.76<--, 0.65<--, 0.63<--, 0.47<--, 0.52<--, 0.46<--            MICROBIOLOGY:      INCOMPLETE NOTE    Juliocesar Josearuna | PGY1| Pager: [phone]  Interval Events: No acute events overight. Finished CIWA taper, lantus increassed to 35, premeal to 18. FSGs over 300s. improvement in mentation. More agreeable to diabetes education. Feeling well otherwise. Has chronic back pain complaint. Receiving ofirmev    REVIEW OF SYSTEMS:  CONSTITUTIONAL: No weakness, fevers or chills  EYES/ENT: No visual changes;  No vertigo or throat pain   NECK: No pain or stiffness  RESPIRATORY: No cough, wheezing, hemoptysis; No shortness of breath  CARDIOVASCULAR: No chest pain or palpitations  GASTROINTESTINAL: No abdominal or epigastric pain. No nausea, vomiting, or hematemesis; No diarrhea or constipation. No melena or hematochezia.  GENITOURINARY: No dysuria, frequency or hematuria  NEUROLOGICAL: No numbness or weakness  SKIN: No itching, burning, rashes, or lesions   All other review of systems is negative unless indicated above.    OBJECTIVE:  ICU Vital Signs Last 24 Hrs  T(C): 36.6 (20 Aug 2022 04:55), Max: 36.7 (19 Aug 2022 21:55)  T(F): 97.8 (20 Aug 2022 04:55), Max: 98 (19 Aug 2022 21:55)  HR: 90 (20 Aug 2022 04:55) (86 - 113)  BP: 110/80 (20 Aug 2022 04:55) (110/80 - 140/98)  BP(mean): --  ABP: --  ABP(mean): --  RR: 18 (20 Aug 2022 04:55) (17 - 18)  SpO2: 100% (20 Aug 2022 04:55) (100% - 100%)    O2 Parameters below as of 20 Aug 2022 04:55  Patient On (Oxygen Delivery Method): room air              CAPILLARY BLOOD GLUCOSE      POCT Blood Glucose.: 380 mg/dL (19 Aug 2022 22:22)      PHYSICAL EXAM:  General: WN/WD NAD  Neurology: A&Ox3, nonfocal, HAMM x 4  Eyes: PERRLA/ EOMI, Gross vision intact  ENT/Neck: Neck supple, trachea midline, No JVD, Gross hearing intact  Respiratory: CTA B/L, No wheezing, rales, rhonchi  CV: RRR, +S1/S2, -S3/S4, no murmurs, rubs or gallops  Abdominal: Soft, NT, ND +BS, No HSM  MSK: 5/5 strength UE/LE bilaterally  Extremities: No edema, 2+ peripheral pulses  Skin: No Rashes, Hematoma, Ecchymosis  Incisions:   Tubes:    HOSPITAL MEDICATIONS:  MEDICATIONS  (STANDING):  dextrose 5%. 1000 milliLiter(s) (50 mL/Hr) IV Continuous <Continuous>  dextrose 5%. 1000 milliLiter(s) (100 mL/Hr) IV Continuous <Continuous>  dextrose 50% Injectable 25 Gram(s) IV Push once  dextrose 50% Injectable 12.5 Gram(s) IV Push once  dextrose 50% Injectable 25 Gram(s) IV Push once  enoxaparin Injectable 40 milliGRAM(s) SubCutaneous every 24 hours  famotidine    Tablet 20 milliGRAM(s) Oral two times a day  glucagon  Injectable 1 milliGRAM(s) IntraMuscular once  insulin glargine Injectable (LANTUS) 30 Unit(s) SubCutaneous at bedtime  insulin lispro (ADMELOG) corrective regimen sliding scale   SubCutaneous three times a day before meals  insulin lispro (ADMELOG) corrective regimen sliding scale   SubCutaneous at bedtime  insulin lispro Injectable (ADMELOG) 15 Unit(s) SubCutaneous three times a day before meals  lisinopril 5 milliGRAM(s) Oral daily  melatonin 6 milliGRAM(s) Oral at bedtime  multivitamin 1 Tablet(s) Oral daily  tamsulosin 0.4 milliGRAM(s) Oral at bedtime  thiamine 100 milliGRAM(s) Oral daily    MEDICATIONS  (PRN):  dextrose Oral Gel 15 Gram(s) Oral once PRN Blood Glucose LESS THAN 70 milliGRAM(s)/deciliter      LABS:                        14.0   6.73  )-----------( 128      ( 19 Aug 2022 06:10 )             42.3     Hgb Trend: 14.0<--, 14.9<--, 16.1<--, 13.8<--, 14.3<--  08-19    131<L>  |  96<L>  |  17  ----------------------------<  255<H>  4.3   |  23  |  0.76    Ca    8.7      19 Aug 2022 06:10  Phos  4.5     08-19  Mg     2.00     08-19    TPro  6.7  /  Alb  3.9  /  TBili  0.3  /  DBili  x   /  AST  31  /  ALT  65<H>  /  AlkPhos  73  08-19    Creatinine Trend: 0.76<--, 0.65<--, 0.63<--, 0.47<--, 0.52<--, 0.46<--            MICROBIOLOGY:      Juliocesar Schultz | PGY1| Pager: [phone]  Interval Events: No acute events overight. Finished CIWA taper, lantus increassed to 35, premeal to 18. FSGs over 300s. improvement in mentation. More agreeable to diabetes education. Feeling well otherwise. Has chronic back pain complaint. Receiving ofirmev    REVIEW OF SYSTEMS:  CONSTITUTIONAL: No weakness, fevers or chills  EYES/ENT: No visual changes;  No vertigo or throat pain   NECK: No pain or stiffness  RESPIRATORY: No cough, wheezing, hemoptysis; No shortness of breath  CARDIOVASCULAR: No chest pain or palpitations  GASTROINTESTINAL: No abdominal or epigastric pain. No nausea, vomiting, or hematemesis; No diarrhea or constipation. No melena or hematochezia.  GENITOURINARY: No dysuria, frequency or hematuria  NEUROLOGICAL: No numbness or weakness  SKIN: No itching, burning, rashes, or lesions   All other review of systems is negative unless indicated above.    OBJECTIVE:  ICU Vital Signs Last 24 Hrs  T(C): 36.6 (20 Aug 2022 04:55), Max: 36.7 (19 Aug 2022 21:55)  T(F): 97.8 (20 Aug 2022 04:55), Max: 98 (19 Aug 2022 21:55)  HR: 90 (20 Aug 2022 04:55) (86 - 113)  BP: 110/80 (20 Aug 2022 04:55) (110/80 - 140/98)  BP(mean): --  ABP: --  ABP(mean): --  RR: 18 (20 Aug 2022 04:55) (17 - 18)  SpO2: 100% (20 Aug 2022 04:55) (100% - 100%)    O2 Parameters below as of 20 Aug 2022 04:55  Patient On (Oxygen Delivery Method): room air              CAPILLARY BLOOD GLUCOSE      POCT Blood Glucose.: 380 mg/dL (19 Aug 2022 22:22)      PHYSICAL EXAM:  General: WN/WD NAD  Neurology: A&Ox3, nonfocal, HAMM x 4  Eyes: PERRLA/ EOMI, Gross vision intact  ENT/Neck: Neck supple, trachea midline, No JVD, Gross hearing intact  Respiratory: CTA B/L, No wheezing, rales, rhonchi  CV: RRR, +S1/S2, -S3/S4, no murmurs, rubs or gallops  Abdominal: Soft, NT, ND +BS, No HSM  MSK: 5/5 strength UE/LE bilaterally  Extremities: No edema, 2+ peripheral pulses  Skin: No Rashes, Hematoma, Ecchymosis  Incisions:   Tubes:    HOSPITAL MEDICATIONS:  MEDICATIONS  (STANDING):  dextrose 5%. 1000 milliLiter(s) (50 mL/Hr) IV Continuous <Continuous>  dextrose 5%. 1000 milliLiter(s) (100 mL/Hr) IV Continuous <Continuous>  dextrose 50% Injectable 25 Gram(s) IV Push once  dextrose 50% Injectable 12.5 Gram(s) IV Push once  dextrose 50% Injectable 25 Gram(s) IV Push once  enoxaparin Injectable 40 milliGRAM(s) SubCutaneous every 24 hours  famotidine    Tablet 20 milliGRAM(s) Oral two times a day  glucagon  Injectable 1 milliGRAM(s) IntraMuscular once  insulin glargine Injectable (LANTUS) 30 Unit(s) SubCutaneous at bedtime  insulin lispro (ADMELOG) corrective regimen sliding scale   SubCutaneous three times a day before meals  insulin lispro (ADMELOG) corrective regimen sliding scale   SubCutaneous at bedtime  insulin lispro Injectable (ADMELOG) 15 Unit(s) SubCutaneous three times a day before meals  lisinopril 5 milliGRAM(s) Oral daily  melatonin 6 milliGRAM(s) Oral at bedtime  multivitamin 1 Tablet(s) Oral daily  tamsulosin 0.4 milliGRAM(s) Oral at bedtime  thiamine 100 milliGRAM(s) Oral daily    MEDICATIONS  (PRN):  dextrose Oral Gel 15 Gram(s) Oral once PRN Blood Glucose LESS THAN 70 milliGRAM(s)/deciliter      LABS:                        14.0   6.73  )-----------( 128      ( 19 Aug 2022 06:10 )             42.3     Hgb Trend: 14.0<--, 14.9<--, 16.1<--, 13.8<--, 14.3<--  08-19    131<L>  |  96<L>  |  17  ----------------------------<  255<H>  4.3   |  23  |  0.76    Ca    8.7      19 Aug 2022 06:10  Phos  4.5     08-19  Mg     2.00     08-19    TPro  6.7  /  Alb  3.9  /  TBili  0.3  /  DBili  x   /  AST  31  /  ALT  65<H>  /  AlkPhos  73  08-19    Creatinine Trend: 0.76<--, 0.65<--, 0.63<--, 0.47<--, 0.52<--, 0.46<--            MICROBIOLOGY:

## 2022-08-21 ENCOUNTER — TRANSCRIPTION ENCOUNTER (OUTPATIENT)
Age: 52
End: 2022-08-21

## 2022-08-21 VITALS
RESPIRATION RATE: 18 BRPM | OXYGEN SATURATION: 100 % | SYSTOLIC BLOOD PRESSURE: 103 MMHG | TEMPERATURE: 98 F | HEART RATE: 102 BPM | DIASTOLIC BLOOD PRESSURE: 73 MMHG

## 2022-08-21 LAB
ALBUMIN SERPL ELPH-MCNC: 3.9 G/DL — SIGNIFICANT CHANGE UP (ref 3.3–5)
ALP SERPL-CCNC: 61 U/L — SIGNIFICANT CHANGE UP (ref 40–120)
ALT FLD-CCNC: 74 U/L — HIGH (ref 4–41)
ANION GAP SERPL CALC-SCNC: 15 MMOL/L — HIGH (ref 7–14)
AST SERPL-CCNC: 37 U/L — SIGNIFICANT CHANGE UP (ref 4–40)
BILIRUB SERPL-MCNC: 0.2 MG/DL — SIGNIFICANT CHANGE UP (ref 0.2–1.2)
BUN SERPL-MCNC: 19 MG/DL — SIGNIFICANT CHANGE UP (ref 7–23)
CALCIUM SERPL-MCNC: 9.3 MG/DL — SIGNIFICANT CHANGE UP (ref 8.4–10.5)
CHLORIDE SERPL-SCNC: 98 MMOL/L — SIGNIFICANT CHANGE UP (ref 98–107)
CO2 SERPL-SCNC: 22 MMOL/L — SIGNIFICANT CHANGE UP (ref 22–31)
CREAT SERPL-MCNC: 0.65 MG/DL — SIGNIFICANT CHANGE UP (ref 0.5–1.3)
EGFR: 113 ML/MIN/1.73M2 — SIGNIFICANT CHANGE UP
GLUCOSE SERPL-MCNC: 176 MG/DL — HIGH (ref 70–99)
HCT VFR BLD CALC: 41.4 % — SIGNIFICANT CHANGE UP (ref 39–50)
HGB BLD-MCNC: 14 G/DL — SIGNIFICANT CHANGE UP (ref 13–17)
MAGNESIUM SERPL-MCNC: 2 MG/DL — SIGNIFICANT CHANGE UP (ref 1.6–2.6)
MCHC RBC-ENTMCNC: 30.3 PG — SIGNIFICANT CHANGE UP (ref 27–34)
MCHC RBC-ENTMCNC: 33.8 GM/DL — SIGNIFICANT CHANGE UP (ref 32–36)
MCV RBC AUTO: 89.6 FL — SIGNIFICANT CHANGE UP (ref 80–100)
NRBC # BLD: 0 /100 WBCS — SIGNIFICANT CHANGE UP (ref 0–0)
NRBC # FLD: 0 K/UL — SIGNIFICANT CHANGE UP (ref 0–0)
PHOSPHATE SERPL-MCNC: 4.9 MG/DL — HIGH (ref 2.5–4.5)
PLATELET # BLD AUTO: 158 K/UL — SIGNIFICANT CHANGE UP (ref 150–400)
POTASSIUM SERPL-MCNC: 4 MMOL/L — SIGNIFICANT CHANGE UP (ref 3.5–5.3)
POTASSIUM SERPL-SCNC: 4 MMOL/L — SIGNIFICANT CHANGE UP (ref 3.5–5.3)
PROT SERPL-MCNC: 6.7 G/DL — SIGNIFICANT CHANGE UP (ref 6–8.3)
RBC # BLD: 4.62 M/UL — SIGNIFICANT CHANGE UP (ref 4.2–5.8)
RBC # FLD: 13.2 % — SIGNIFICANT CHANGE UP (ref 10.3–14.5)
SODIUM SERPL-SCNC: 135 MMOL/L — SIGNIFICANT CHANGE UP (ref 135–145)
WBC # BLD: 7.42 K/UL — SIGNIFICANT CHANGE UP (ref 3.8–10.5)
WBC # FLD AUTO: 7.42 K/UL — SIGNIFICANT CHANGE UP (ref 3.8–10.5)

## 2022-08-21 PROCEDURE — 99238 HOSP IP/OBS DSCHRG MGMT 30/<: CPT

## 2022-08-21 RX ORDER — METFORMIN HYDROCHLORIDE 850 MG/1
850 TABLET ORAL
Qty: 0 | Refills: 0 | DISCHARGE

## 2022-08-21 RX ORDER — INSULIN GLARGINE 100 [IU]/ML
40 INJECTION, SOLUTION SUBCUTANEOUS
Qty: 30 | Refills: 0
Start: 2022-08-21 | End: 2022-09-19

## 2022-08-21 RX ORDER — INSULIN LISPRO 100/ML
20 VIAL (ML) SUBCUTANEOUS
Qty: 60 | Refills: 0
Start: 2022-08-21

## 2022-08-21 RX ORDER — LIDOCAINE 4 G/100G
1 CREAM TOPICAL ONCE
Refills: 0 | Status: COMPLETED | OUTPATIENT
Start: 2022-08-21 | End: 2022-08-21

## 2022-08-21 RX ORDER — ACETAMINOPHEN 500 MG
650 TABLET ORAL EVERY 6 HOURS
Refills: 0 | Status: DISCONTINUED | OUTPATIENT
Start: 2022-08-21 | End: 2022-08-21

## 2022-08-21 RX ORDER — ISOPROPYL ALCOHOL, BENZOCAINE .7; .06 ML/ML; ML/ML
1 SWAB TOPICAL
Qty: 100 | Refills: 1
Start: 2022-08-21 | End: 2022-10-09

## 2022-08-21 RX ADMIN — Medication 100 MILLIGRAM(S): at 12:58

## 2022-08-21 RX ADMIN — FAMOTIDINE 20 MILLIGRAM(S): 10 INJECTION INTRAVENOUS at 06:40

## 2022-08-21 RX ADMIN — Medication 1 TABLET(S): at 12:58

## 2022-08-21 RX ADMIN — Medication 2: at 08:51

## 2022-08-21 RX ADMIN — LISINOPRIL 5 MILLIGRAM(S): 2.5 TABLET ORAL at 06:40

## 2022-08-21 RX ADMIN — Medication 20 UNIT(S): at 12:57

## 2022-08-21 RX ADMIN — Medication 20 UNIT(S): at 08:51

## 2022-08-21 RX ADMIN — LIDOCAINE 1 PATCH: 4 CREAM TOPICAL at 12:57

## 2022-08-21 NOTE — PROGRESS NOTE ADULT - PROBLEM SELECTOR PROBLEM 2
Hypertension
Alcohol dependence with withdrawal
Hypertension
Type 2 diabetes mellitus with hyperglycemia
Alcohol dependence with withdrawal
Type 2 diabetes mellitus with hyperglycemia
Alcohol dependence with withdrawal
Type 2 diabetes mellitus with hyperglycemia

## 2022-08-21 NOTE — PROGRESS NOTE ADULT - PROBLEM SELECTOR PLAN 10
DVT: enoxaparin  Ethics: Full code  Dispo: Likely home ?PT

## 2022-08-21 NOTE — PROGRESS NOTE ADULT - PROBLEM SELECTOR PLAN 7
c/w home statin
Holding statin iso transaminitis
c/w home statin
Holding statin iso transaminitis

## 2022-08-21 NOTE — CHART NOTE - NSCHARTNOTEFT_GEN_A_CORE
51M hx of alcohol use, DT's pancreatitis, DM, HTN, T2DM (previous A1C 12.9 in 2019), HLD presenting with full body tremors with slowed speech and left hand numbness, unlikely ETOH withdrawal per medicine team. Endocrine called for DM assistance, A1c 8.2    Contacted by primary team for discharge recommendations   Based on inpatient insulin needs, he requires full basal/bolus insulin plan  Per primary team, Basaglar and Admelog PENS are covered with $1 copay  Recommend for discharge today Basaglar KwikPEN 40 units SQ qHS and Admelog Solostar PEN 20 units SQ TID before meals (Hold if skips meal)  Per team, patient's wife is an RN, can assist with insulin injections at home  Patient with hx of pancreatitis in past and heavy ETOH use which is contraindication to many PO diabetes agents - STOP Metformin and stop any other DM oral agents for DC  Ensure patient has working glucometer, test strips, lancets, alcohol pads, and BD ragini pen needles  Please also prescribe glucose tabs, Baqsimi nasal spray or glucagon emergency kit for hypoglycemia risk   Recommend outpatient followup with PCP, endocrinology, opthalmology  DC recs given to primary team    CAPILLARY BLOOD GLUCOSE    POCT Blood Glucose.: 111 mg/dL (21 Aug 2022 12:15)  POCT Blood Glucose.: 170 mg/dL (21 Aug 2022 08:31)  POCT Blood Glucose.: 118 mg/dL (20 Aug 2022 21:53)  POCT Blood Glucose.: 213 mg/dL (20 Aug 2022 18:11)      08-21    135  |  98  |  19  ----------------------------<  176<H>  4.0   |  22  |  0.65    Ca    9.3      21 Aug 2022 06:47  Phos  4.9     08-21  Mg     2.00     08-21    TPro  6.7  /  Alb  3.9  /  TBili  0.2  /  DBili  x   /  AST  37  /  ALT  74<H>  /  AlkPhos  61  08-21      MEDICATIONS  (STANDING):  atorvastatin 40 milliGRAM(s) Oral at bedtime  dextrose 5%. 1000 milliLiter(s) (50 mL/Hr) IV Continuous <Continuous>  dextrose 5%. 1000 milliLiter(s) (100 mL/Hr) IV Continuous <Continuous>  dextrose 50% Injectable 25 Gram(s) IV Push once  dextrose 50% Injectable 12.5 Gram(s) IV Push once  dextrose 50% Injectable 25 Gram(s) IV Push once  enoxaparin Injectable 40 milliGRAM(s) SubCutaneous every 24 hours  famotidine    Tablet 20 milliGRAM(s) Oral two times a day  glucagon  Injectable 1 milliGRAM(s) IntraMuscular once  insulin glargine Injectable (LANTUS) 40 Unit(s) SubCutaneous at bedtime  insulin lispro (ADMELOG) corrective regimen sliding scale   SubCutaneous three times a day before meals  insulin lispro (ADMELOG) corrective regimen sliding scale   SubCutaneous at bedtime  insulin lispro Injectable (ADMELOG) 20 Unit(s) SubCutaneous three times a day before meals  lisinopril 5 milliGRAM(s) Oral daily  melatonin 6 milliGRAM(s) Oral at bedtime  multivitamin 1 Tablet(s) Oral daily  tamsulosin 0.4 milliGRAM(s) Oral at bedtime  thiamine 100 milliGRAM(s) Oral daily    Diet, Regular:   Consistent Carbohydrate {Evening Snack} (CSTCHOSN) (08-16-22 @ 08:39)    A1C with Estimated Average Glucose Result: 8.2 % (08-15-22 @ 10:02)    Jennifer Knight  Nurse Practitioner  Division of Endocrinology & Diabetes  In house pager #[phone]/long range pager #[phone]    If before 9AM or after 6PM, or on weekends/holidays, please call endocrine answering service for assistance ([phone]).  For nonurgent matters email [email or web address] for assistance.

## 2022-08-21 NOTE — PROGRESS NOTE ADULT - PROBLEM SELECTOR PLAN 6
c/w home lisinopril

## 2022-08-21 NOTE — PROGRESS NOTE ADULT - PROBLEM SELECTOR PROBLEM 3
Hyperlipidemia
Type 2 diabetes mellitus with hyperglycemia
Type 2 diabetes mellitus with hyperglycemia
Numbness of left hand
Type 2 diabetes mellitus with hyperglycemia
Numbness of left hand
Numbness of left hand
Yes

## 2022-08-21 NOTE — PROGRESS NOTE ADULT - PROBLEM SELECTOR PLAN 3
Patient with isolated distal LUE weakness and numbness, initially a code stroke, negative CT-head. Unlikely to be related to acute alcohol use or diabetic nephropathy as symptoms began acutely.     - neuro consult, c/f alcohol induced neuropathy, improving as patient shannan  - Serum B12 WNL  - RESOLVED
Patient with isolated distal LUE weakness and numbness, initially a code stroke, negative CT-head. Unlikely to be related to acute alcohol use or diabetic nephropathy as symptoms began acutely.     - neuro consult, c/f alcohol induced neuropathy, improving as patient shannan  - Serum B12 WNL  - RESOLVED
Patient with marked hyperglycemia, had been on insulin previously for 3 months in 2019. Currently endorsing emesis, nausea, and lack of appetite, however not consistent with DKA and may represent progression of chronic diabetes.    Home glipizide and metformin held while inpatient   BHB .5, not likely DKA given no acidemia and low BHB, A1C of 8.2, likely under-controlled on home regimen    - Mod-ISS - on 18u lantus with 8u premeal, however still requiring 20u insulin throughout last 24 hrs 8/17-8/18   - endo onboard  - Insulin increase to 24u lantus and 11u premeal.
Patient with marked hyperglycemia, had been on insulin previously for 3 months in 2019. Currently endorsing emesis, nausea, and lack of appetite, however not consistent with DKA and may represent progression of chronic diabetes.    Home glipizide and metformin held while inpatient   BHB .5, not likely DKA given no acidemia and low BHB, A1C of 8.2, likely under-controlled on home regimen    - Mod-ISS - on 35u lantus with 18u premeal,   -admelog pen and basaglar pen covered by insurance with $1 copays  - endo onboard  - Insulin increase to 24u lantus and 11u premeal.
Patient with marked hyperglycemia, had been on insulin previously for 3 months in 2019. Currently endorsing emesis, nausea, and lack of appetite, however not consistent with DKA and may represent progression of chronic diabetes.    Home glipizide and metformin held while inpatient   BHB .5, not likely DKA given no acidemia and low BHB, A1C of 8.2, likely under-controlled on home regimen    - Mod-ISS - on 35u lantus with 18u premeal,   -admelog pen and basaglar pen covered by insurance with $1 copays  - endo onboard  - Insulin increase to 24u lantus and 11u premeal.
Patient with isolated distal LUE weakness and numbness, initially a code stroke, negative CT-head. Unlikely to be related to acute alcohol use or diabetic nephropathy as symptoms began acutely.     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

## 2022-08-21 NOTE — DISCHARGE NOTE NURSING/CASE MANAGEMENT/SOCIAL WORK - PATIENT PORTAL LINK FT
You can access the FollowMyHealth Patient Portal offered by [facility] by registering at the following website: [email or web address]. By joining [employer]’s FollowMyHealth portal, you will also be able to view your health information using other applications (apps) compatible with our system.

## 2022-08-21 NOTE — PROGRESS NOTE ADULT - PROVIDER SPECIALTY LIST ADULT
Internal Medicine
Endocrinology
Internal Medicine
Endocrinology

## 2022-08-21 NOTE — DISCHARGE NOTE NURSING/CASE MANAGEMENT/SOCIAL WORK - NSDCPEFALRISK_GEN_ALL_CORE
For information on Fall & Injury Prevention, visit: [email or web address] OR  [email or web address] OR  https://www.cdc.gov/steadi/patient.html

## 2022-08-21 NOTE — PROGRESS NOTE ADULT - PROBLEM SELECTOR PLAN 9
c/w home flomax .4

## 2022-08-21 NOTE — PROGRESS NOTE ADULT - PROBLEM SELECTOR PROBLEM 1
Type 2 diabetes mellitus with hyperglycemia
Numbness of left hand
Alcohol dependence with withdrawal
Numbness of left hand
Numbness of left hand
Alcohol dependence with withdrawal
Alcohol dependence with withdrawal

## 2022-08-21 NOTE — PROGRESS NOTE ADULT - SUBJECTIVE AND OBJECTIVE BOX
PROGRESS NOTE:     Patient is a 51y old  Male who presents with a chief complaint of hand numbness (20 Aug 2022 14:32)      SUBJECTIVE / OVERNIGHT EVENTS:  No acute events overnight.     MEDICATIONS  (STANDING):  atorvastatin 40 milliGRAM(s) Oral at bedtime  dextrose 5%. 1000 milliLiter(s) (50 mL/Hr) IV Continuous <Continuous>  dextrose 5%. 1000 milliLiter(s) (100 mL/Hr) IV Continuous <Continuous>  dextrose 50% Injectable 25 Gram(s) IV Push once  dextrose 50% Injectable 12.5 Gram(s) IV Push once  dextrose 50% Injectable 25 Gram(s) IV Push once  enoxaparin Injectable 40 milliGRAM(s) SubCutaneous every 24 hours  famotidine    Tablet 20 milliGRAM(s) Oral two times a day  glucagon  Injectable 1 milliGRAM(s) IntraMuscular once  insulin glargine Injectable (LANTUS) 40 Unit(s) SubCutaneous at bedtime  insulin lispro (ADMELOG) corrective regimen sliding scale   SubCutaneous three times a day before meals  insulin lispro (ADMELOG) corrective regimen sliding scale   SubCutaneous at bedtime  insulin lispro Injectable (ADMELOG) 20 Unit(s) SubCutaneous three times a day before meals  lisinopril 5 milliGRAM(s) Oral daily  melatonin 6 milliGRAM(s) Oral at bedtime  multivitamin 1 Tablet(s) Oral daily  tamsulosin 0.4 milliGRAM(s) Oral at bedtime  thiamine 100 milliGRAM(s) Oral daily    MEDICATIONS  (PRN):  dextrose Oral Gel 15 Gram(s) Oral once PRN Blood Glucose LESS THAN 70 milliGRAM(s)/deciliter      CAPILLARY BLOOD GLUCOSE      POCT Blood Glucose.: 118 mg/dL (20 Aug 2022 21:53)  POCT Blood Glucose.: 213 mg/dL (20 Aug 2022 18:11)  POCT Blood Glucose.: 326 mg/dL (20 Aug 2022 12:38)  POCT Blood Glucose.: 275 mg/dL (20 Aug 2022 08:25)    I&O's Summary      PHYSICAL EXAM:  Vital Signs Last 24 Hrs  T(C): 36.5 (21 Aug 2022 06:23), Max: 36.7 (20 Aug 2022 14:00)  T(F): 97.7 (21 Aug 2022 06:23), Max: 98 (20 Aug 2022 14:00)  HR: 90 (21 Aug 2022 06:23) (90 - 100)  BP: 121/82 (21 Aug 2022 06:23) (121/82 - 136/97)  BP(mean): --  RR: 19 (21 Aug 2022 06:23) (17 - 19)  SpO2: 99% (21 Aug 2022 06:23) (99% - 100%)    Parameters below as of 21 Aug 2022 06:23  Patient On (Oxygen Delivery Method): room air        General: WN/WD NAD  Neurology: A&Ox3, nonfocal, HAMM x 4  Eyes: PERRLA/ EOMI, Gross vision intact  ENT/Neck: Neck supple, trachea midline, No JVD, Gross hearing intact  Respiratory: CTA B/L, No wheezing, rales, rhonchi  CV: RRR, +S1/S2, -S3/S4, no murmurs, rubs or gallops  Abdominal: Soft, NT, ND +BS, No HSM  MSK: 5/5 strength UE/LE bilaterally  Extremities: No edema, 2+ peripheral pulses  Skin: No Rashes, Hematoma, Ecchymosis    LABS:                        14.0   7.42  )-----------( 158      ( 21 Aug 2022 06:47 )             41.4     08-20    132<L>  |  97<L>  |  16  ----------------------------<  293<H>  4.1   |  23  |  0.65    Ca    8.8      20 Aug 2022 06:00  Phos  4.1     08-20  Mg     1.90     08-20    TPro  6.5  /  Alb  3.9  /  TBili  0.2  /  DBili  x   /  AST  26  /  ALT  62<H>  /  AlkPhos  88  08-20                RADIOLOGY & ADDITIONAL TESTS:  Results Reviewed:   Imaging Personally Reviewed:  Electrocardiogram Personally Reviewed:    COORDINATION OF CARE:  Care Discussed with Consultants/Other Providers [Y/N]:  Prior or Outpatient Records Reviewed [Y/N]:

## 2022-08-21 NOTE — PROGRESS NOTE ADULT - ASSESSMENT
51M hx of alcohol use, DT's pancreatitis, DM, HTN, T2DM (previous A1C 12.9 in 2019), HLD presenting with full body tremors with slowed speech and left hand numbness, unlikely ETOH withdrawal per medicine team. Endocrine called for DM assistance, A1c 8.2    1. DM, type 2  A1c 8.2%  On Metformin at home (850 mg BID)  Also states another DM agent he takes but does not know the name  Patient with hx of pancreatitis in past and heavy ETOH use which is contraindication to many PO diabetes agents    While inpatient:  BG target 100-180 mg/dl - above target  Please limit juices and home food. If replacing hospital meal with food from home, recommend he eat it at mealtimes after insulin has been administered. Patient educated about this  Total insulin correction in last 24h = 26 units   Recommend to increase Lantus to 40 units SQ qHS   Increase Admelog to 20 units SQ TID before meals (Hold if NPO/not eating meal)   Continue with Admelog moderate dose correctional scale before meals, moderate dose scale at bedtime  Hypoglycemia protocol in place   Carb Consistent Diet, Nutrition consult   Provider to RN for diabetes/insulin pen teaching.  Although patient is alert and oriented x 3, patient sometimes does not make sense.  Therefore, would involve family in insulin pen teaching  Per primary team, patient is pending MRI of head. Patient lives with wife.      Discharge Plan:   STOP Metformin given ETOH use   Will need dc on basal/bolus insulin PENS vs 70/30 (2019 was dc'd on 70/30 and states he no longer takes it)  Please send Lantus Solostar pen and Humalog Kwikpen as test script to check insurance coverage.    Ok to send with current doses and update prior to d/c  If Lantus not covered- can try alternating with one of following   Tresiba/Basaglar/Toujeo/Levemir  If Humalog not covered- can try alternating with one of following  Novolog/Admelog  Ensure patient has working glucometer, test strips, lancets, alcohol pads, and BD ragini pen needles  Please also prescribe glucose tabs, Baqsimi nasal spray or glucagon emergency kit for hypoglycemia risk   Recommend outpatient followup with PCP, endocrinology, opthalmology    2. HTN  Goal BP <130/80  Within goal, not on antihypertensives   Outpatient microalbumin/creat ratio    3. HLD  Consider statin if no contraindications     Jennifer Knight  Nurse Practitioner  Division of Endocrinology & Diabetes  In house pager #[phone]/long range pager #[phone]    If before 9AM or after 6PM, or on weekends/holidays, please call endocrine answering service for assistance ([phone]).  For nonurgent matters email [email or web address] for assistance.     
51M hx of alcohol use, DT's pancreatitis, DM, HTN, T2DM (previous A1C 12.9 in 2019), HLD presenting with full body tremors with slowed speech and left hand numbness, unlikely ETOH withdrawal per medicine team. Endocrine called for DM assistance, A1c 8.2    1. DM, type 2  A1c 8.2%  On Metformin at home (850 mg BID)  Also states another DM agent he takes but does not know the name  Patient with hx of pancreatitis in past and heavy ETOH use which is contraindication to many PO diabetes agents    While inpatient:  BG target 100-180 mg/dl - above target, insulin increases made by primary team  Lantus 30 units SQ qHS  Admelog 15 units SQ TID before meals (Hold if NPO/not eating meal)   Ok to continue with Admelog moderate dose correctional scale before meals, moderate dose scale at bedtime  Hypoglycemia protocol in place   Carb Consistent Diet, Nutrition consult   Provider to RN for diabetes/insulin pen teaching.  Although patient is alert and oriented x 3, patient sometimes does not make sense.  Therefore, would involve family in insulin pen teaching  Per primary team, patient is pending MRI of head. Patient lives with wife.      Discharge Plan:   STOP Metformin given ETOH use   Will need dc on basal/bolus insulin PENS vs 70/30 (2019 was dc'd on 70/30 and states he no longer takes it)  Please send Lantus Solostar pen and Humalog Kwikpen as test script to check insurance coverage.    Ok to send with current doses and update prior to d/c  If Lantus not covered- can try alternating with one of following   Tresiba/Basaglar/Toujeo/Levemir  If Humalog not covered- can try alternating with one of following  Novolog/Admelog  Ensure patient has working glucometer, test strips, lancets, alcohol pads, and BD ragini pen needles  Please also prescribe glucose tabs, Baqsimi nasal spray or glucagon emergency kit for hypoglycemia risk   Recommend outpatient followup with PCP, endocrinology, opthalmology    2. HTN  Goal BP <130/80  Within goal, not on antihypertensives   Outpatient microalbumin/creat ratio    3. HLD  Recommend to check fasting lipid panel  Consider statin if no contraindications     Jennifer Knight  Nurse Practitioner  Division of Endocrinology & Diabetes  In house pager #[phone]/long range pager #[phone]    If before 9AM or after 6PM, or on weekends/holidays, please call endocrine answering service for assistance ([phone]).  For nonurgent matters email [email or web address] for assistance.     
  51M hx of alcohol use, DT's pancreatitis, DM, HTN, T2DM (previous A1C 12.9 in 2019), HLD presenting with full body tremors with slowed speech and left hand numbness        endocrine called for DM assistance   a1c 8.2    labs notable for mild BHOB 8/15  no AG or decreased bicarb on labs today    1.DM, type 2  on metformin at home   also states another DM agent he takes but does not know the name  pt with hx of pancreatitis in past   heavy etoh use      While inpatient  BG target 100-180 mg/dl, remains above goal  - agree with increases- Lantus 18 units sq qhs and Admelog 8 units sq tid ac  - Admelog  Low dose Correction Scale Premeal & seperate low dose  Correction Scale Bedtime   - Hypoglycemia protocol in place   - Carb Consistent Diet   - Nutrition consult   - Provider to RN for diabetes/insulin pen teaching         dc planning   stop metformin given ETOH use   will need dc on basal bolus vs 70/30 (2019 was  dced on 70/30 and states he no longer takes it)  also reports another DM med at home (but does not know name)  please do current med rec (med recc at admission notes insulin which he does not take)  Please also send prescriptions for new glucometer per insurance, test strips, lancets, BD ragini needles, alcohol pads and glucose tabs to the pts pharamcy prior to DC.  please send glucometer and supplies to [facility] and "ask for delivery to bedside" so that pt can be taught the glucometer being perscribed.  will need appts outpt, pcp, endocrine, opthomology       2. HTN  goal BP <130/80  outpt mc/cr ratio      3. HLD  lipid panel   consider statin if no contratincations             Kat Jones  Nurse Practitioner  Division of Endocrinology & Diabetes  Pager # [phone]      If after 6PM or before 9AM, or on weekends/holidays, please call endocrine answering service for assistance ([phone]).  For nonurgent matters email [email or web address] for assistance.  
51M hx of alcohol use, previous admissions for DT's and pancreatitis, HTN, T2DM (previous A1C 12.9 in 2019 no longer on insulin), HLD presenting with full body tremors, slowed speech and left hand numbness that began 8/14 presenting with symptoms of alcohol withdrawal vs intoxication and hyperglycemia w/ lactatemia c/f progression of T2DM vs alcohol induced hyperglycemia. Cleared by neuro consult. Endo on board. 
  51M hx of alcohol use, DT's pancreatitis, DM, HTN, T2DM (previous A1C 12.9 in 2019), HLD presenting with full body tremors with slowed speech and left hand numbness, unlikely ETOH withdrawl per medicine team        endocrine called for DM assistance   a1c 8.2      1.DM, type 2  on metformin at home   also states another DM agent he takes but does not know the name  pt with hx of pancreatitis in past   heavy etoh use      While inpatient  BG target 100-180 mg/dl, remains above goal  - agree with increases- Lantus 24 units sq qhs and Admelog 11 units sq tid ac  - Admelog  Low dose Correction Scale Premeal & seperate low dose  Correction Scale Bedtime   - Hypoglycemia protocol in place   - Carb Consistent Diet   - Nutrition consult   - Provider to RN for diabetes/insulin pen teaching.  Although patient is alert and oriented x 3, patient sometimes does not make sense.  Therefore, would involve family in insulin pen teaching  Per primary team, patient is pending MRI of head.  Patient lives with wife.          dc planning   stop metformin given ETOH use   will need dc on basal bolus vs 70/30 (2019 was  dced on 70/30 and states he no longer takes it)  Please send Lantus solostar pen and humalog kwikpen as test script to check insurance coverage.  Ok to send with current doses and update prior to d/c  If Lantus not covered- can try alternating with one of following   tresiba/basaglar/toujeo/Levemir  If Humalog not covered- can try alternating with one of following  novolog/apidra/admelog/fiasp  Ensure patient has working glucometer, test strips, lancets, alcohol pads, and BD ragini pen needles  Please also prescribe glucose tabs, Baqsimi nasal spray or glucagon emergency kit for hypoglycemia risk   will need appts outpt, pcp, endocrine, opthomology       2. HTN  goal BP <130/80  outpt mc/cr ratio      3. HLD  lipid panel   consider statin if no contratincations             Kat Jones  Nurse Practitioner  Division of Endocrinology & Diabetes  Pager # [phone]      If after 6PM or before 9AM, or on weekends/holidays, please call endocrine answering service for assistance ([phone]).  For nonurgent matters email [email or web address] for assistance.  
51M hx of alcohol use, previous admissions for DT's and pancreatitis, HTN, T2DM (previous A1C 12.9 in 2019 no longer on insulin), HLD presenting with full body tremors, slowed speech and left hand numbness that began 8/14 presenting with symptoms of alcohol withdrawal vs intoxication and hyperglycemia w/ lactatemia c/f progression of T2DM vs alcohol induced hyperglycemia. Cleared by neuro consult. Endo on board. 
51M hx of alcohol use, previous admissions for DT's and pancreatitis, HTN, T2DM (previous A1C 12.9 in 2019 no longer on insulin), HLD presenting with full body tremors, slowed speech and left hand numbness that began 8/14 presenting with symptoms of alcohol withdrawal vs intoxication and hyperglycemia w/ lactatemia c/f progression of T2DM vs alcohol induced hyperglycemia. Cleared by neuro consult.  
51M hx of alcohol use, previous admissions for DT's and pancreatitis, HTN, T2DM (previous A1C 12.9 in 2019 no longer on insulin), HLD presenting with full body tremors, slowed speech and left hand numbness that began 8/14 presenting with symptoms of alcohol withdrawal vs intoxication and hyperglycemia w/ lactatemia c/f progression of T2DM vs alcohol induced hyperglycemia. Cleared by neuro consult. Endo on board. 

## 2022-08-21 NOTE — PROGRESS NOTE ADULT - PROBLEM SELECTOR PLAN 8
c/w home famotidine 20 BID

## 2022-08-21 NOTE — PROGRESS NOTE ADULT - REASON FOR ADMISSION
hand numbness

## 2022-08-21 NOTE — PROGRESS NOTE ADULT - PROBLEM SELECTOR PLAN 2
Patient endorsing 1pt/day rum and beer, previous hospitalizations for alcohol withdrawal with questionable DTs. Elevated EtOH in ED, started on CIWA initially at 9, 4/5 on 8/16 5s on 8/18 3-->2s 8/19. No active signs of DTs    - Finished Librium taper in setting of national lorazepam shortage, required one dose of ativan .5 8/16 evening, also attempting to leave AMA, however lacks capacity related to his diabetes management as well as alcohol withdrawal.   - daily multivitamin  - would benefit from alcohol cessation counseling (SBIRT) - recommending f/u: Addiction Services at [facility]: [phone], [address] [place] [address] p: [phone], [facility] Crisis walk in clinic p:[phone],

## 2022-08-21 NOTE — PROGRESS NOTE ADULT - PROBLEM SELECTOR PLAN 4
Patient with elevated lactate on admission in the 5s, received 2L LR in ED however remained high, unclear etiology for elevated lactate as is not consistent with pure alcohol withdrawal process.   Initially presented with alkalemia c/f a contraction alkalosis iso emesis at home, now w/ corrected pH and elevated lactate. Etiology unlikely to be related to shock or infection, c/f either post-seizure vs type B LA process vs metformin use    - Repeat lactate at 1800 on 8/15 was 1.9, indicating VBGs lactates may have been erroneous as patient not currently lactate acidemic on better test. Patient may have had initial contraction alkalosis now improving, suspect lactate will continue to improve as well.     RESOLVED
Patient with elevated lactate on admission in the 5s, received 2L LR in ED however remained high, unclear etiology for elevated lactate as is not consistent with pure alcohol withdrawal process.   Initially presented with alkalemia c/f a contraction alkalosis iso emesis at home, now w/ corrected pH and elevated lactate. Etiology unlikely to be related to shock or infection, c/f either post-seizure vs type B LA process vs metformin use    - Repeat lactate at 1800 on 8/15 was 1.9, indicating VBGs lactates may have been erroneous as patient not currently lactate acidemic on better test. Patient may have had initial contraction alkalosis now improving, suspect lactate will continue to improve as well.     RESOLVED
Patient with elevated lactate on admission in the 5s, received 2L LR in ED however remained high, unclear etiology for elevated lactate as is not consistent with pure alcohol withdrawal process.   Initially presented with alkalemia c/f a contraction alkalosis iso emesis at home, now w/ corrected pH and elevated lactate. Etiology unlikely to be related to shock or infection, c/f either post-seizure vs type B LA process vs metformin use    - Repeat lactate at 1800 on 8/15 was 1.9, indicating VBGs lactates may have been erroneous as patient not currently lactate acidemic on better test. Patient may have had initial contraction alkalosis now improving, suspect lactate will continue to improve as well.   - NS at 75cc/hr
Patient with elevated lactate on admission in the 5s, received 2L LR in ED however remained high, unclear etiology for elevated lactate as is not consistent with pure alcohol withdrawal process.   Initially presented with alkalemia c/f a contraction alkalosis iso emesis at home, now w/ corrected pH and elevated lactate. Etiology unlikely to be related to shock or infection, c/f either post-seizure vs type B LA process vs metformin use    - Repeat lactate at 1800 on 8/15 was 1.9, indicating VBGs lactates may have been erroneous as patient not currently lactate acidemic on better test. Patient may have had initial contraction alkalosis now improving, suspect lactate will continue to improve as well.     RESOLVED

## 2022-08-21 NOTE — PROGRESS NOTE ADULT - ATTENDING COMMENTS
51 y.o. M w/ a hx of alcohol use c/b withdrawal DT's and pancreatitis in the past, HTN, DM2 hospitalized for alcohol withdrawal, hyperglycemia as well as L hand numbness.     Continues to have some R sided neck/shoulder discomfort, otherwise feels well. MRI- no acute CVA.     # Numbness: Resolved, no CVA seen on MRI. Follow up with Neuro   # Alcohol withdrawal: Completed Librium taper   # DM2 c/b hyperglycemia: Cont Lantus/Lispro. Endo following.     Plan for DC home today
pt remains ataxic despite detox  will check MRH r.o intracranial pathology, cva - tal neuro input  cw taper   9inc insulin as above until Endo recs later today
CIWA 4-5, clinically looks much better today  lactate normalized   increase Lantus and premeal as above, Endo eval  numbness resolved, doubt need further neuro work up
completed taper, resolved w/d with CIWA 1   awaiting MR imaging r.o CVA, lesion considering ataxia - tal neuro input
reportedly wanted to leave yesterday, required small dose of Ativan on top of taper - today he is amenable to stay   although withdrawal is improving, he appears to lack insight and judgment into his clinical situation and ramifications of leaving - ie no capacity to leave AMA  CIWA scores ~ 6 - mostly tremors - considering hx of DTs will not shorten taper  inc insulin as above, tal endo input   pt reported to have balance and ambulation issues - likely w/d and ETOH related w chronic neuropathy - PT eval, fall precautions - B12 was normal and otherwise nonfocal
51 y.o. M w/ a hx of alcohol use c/b withdrawal DT's and pancreatitis in the past, HTN, DM2 hospitalized for alcohol withdrawal, hyperglycemia as well as L hand numbness.   Patient feels well, notes some R neck discomfort otherwise numbness has resolved.     # Numbness: Resolved, awaiting MRI brain to r/o CVA   # Alcohol withdrawal: Completed Librium taper   # DM2 c/b hyperglycemia: Cont Lantus/Lispro. Endo following.

## 2022-11-07 NOTE — PROGRESS NOTE ADULT - PROBLEM SELECTOR PLAN 5
- - - Uncontrolled to 160s/110s; not on any meds at home  - c/w lisinopril 5mg daily  - Up-titrate as needed

## 2023-12-19 NOTE — ED ADULT TRIAGE NOTE - CCCP TRG CHIEF CMPLNT
weakness
Pt c/o HTN, HA, vomiting, diarrhea x yesterday. "her blood pressure was 200/100 at home". Per family, decreased po intake for 5 days. Pt reports history of HTN, has not taken medication for years. Denies cp, dizziness, sob, change in vision.

## 2024-02-08 NOTE — ED ADULT TRIAGE NOTE - BEFAST EYES
Detail Level: Detailed General Sunscreen Counseling: I recommended a broad spectrum sunscreen with a SPF of 30 or higher.  Sunscreens should be applied at least 15 minutes prior to expected sun exposure and then every 2 hours after that. Sun protective clothing is recommended as well. No

## 2024-06-09 NOTE — H&P ADULT - I WAS PHYSICALLY PRESENT FOR THE KEY PORTIONS OF THE EVALUATION AND MANAGEMENT (E/M) SERVICE PROVIDED.  I AGREE WITH THE ABOVE HISTORY, PHYSICAL, AND PLAN WHICH I HAVE REVIEWED AND EDITED WHERE APPROPRIATE
Patient and patient's wife at bedside asking questions about ordered medications, specifically Brilinta. Patient received 180mg of brilinta while in cath lab @ 1035 per md orders, and received an additional 90mg at 1439, with an additional dose ordered to be given at 2100. This nurse expressed concern to the dose being given in such close proximity to the 1439 dose and the pharmacist graciously re timed the dose for 2300 while this nurse reached out to on call provider for Dr. Solorzano for clarification. Dr. James returned call and this nurse communicated all above information and Dr. James instructed this nurse to give the dose of brilinta this evening for a total of 360mg. This nurse read back and verified telephone order and Dr. James confirmed for a second time he wanted the additional 90mg given to equal 360mg for the day. Will update patient and wife on plan of care.   Statement Selected

## 2024-06-26 NOTE — CONSULT NOTE ADULT - CONSULT REASON
numbness LUE
DM
[de-identified] : Now s/p BMT and adenoidectomy Breathing is greatly improved Doing well. No vpi sx's  No recent hospitalizations or abx.

## 2024-10-22 NOTE — ED ADULT NURSE NOTE - CHIEF COMPLAINT QUOTE
"Subjective   Patient ID: Ildefonso Matamoros \"Demetris\" is a 28 y.o. male who presents for Pain (FUV for MBB 10-18-24. Patient states the pain was less during the 4 hours after injection, but he felt a tingling in his R lower leg. Oleg low back pain with radiation to R leg and occasionally L buttocks and leg has a pain score of 5/10 today. Pain is described as a \"pressure\" today, with an occasional \"grinding\" or sharp pains. Standing, walking, twisting, or turning will cause or increase the pain. Laying down helps relieve the pain. If he curls himself in a ball that helps the most.). FELISA = 30/100. Patient states if he stands without putting any pressure on his R leg, he has no pain.  Carlyn Valencia RN 10/22/24 8:30 AM     The patient is a 28-year-old male who presents today for a follow-up after undergoing his second bilateral L4-5 and L5-S1 medial branch block.  He reports no relief from this injection.  He says the low back pain is constant, but that it will radiate down into his right groin/lateral thigh area with numbness and tingling.  He is open to further injections, but does not feel hopeful that it would help, given that none of them have been helpful yet.  He has seen a surgeon in the past who would recommend an extensive surgery for his scoliosis with his entire lumbar spine being fused.  He is hoping to delay surgery for as long as he can.        Review of Systems   Constitutional: Negative.    HENT: Negative.     Eyes: Negative.    Respiratory:  Negative for cough, shortness of breath and wheezing.    Cardiovascular:  Negative for chest pain, palpitations and leg swelling.   Endocrine: Negative.    Genitourinary: Negative.    Musculoskeletal:  Positive for back pain and myalgias. Negative for arthralgias.   Skin: Negative.    Allergic/Immunologic: Negative.    Neurological:  Negative for facial asymmetry, weakness and light-headedness.   Hematological:  Negative for adenopathy. Does not bruise/bleed " easily.   Psychiatric/Behavioral:  Negative for dysphoric mood and suicidal ideas.        Objective   Physical Exam  Constitutional:       General: He is not in acute distress.     Appearance: Normal appearance.   HENT:      Head: Normocephalic.      Mouth/Throat:      Mouth: Mucous membranes are moist.   Eyes:      Extraocular Movements: Extraocular movements intact.   Cardiovascular:      Rate and Rhythm: Normal rate and regular rhythm.      Pulses: Normal pulses.      Heart sounds: Normal heart sounds. No murmur heard.     No friction rub. No gallop.   Pulmonary:      Effort: Pulmonary effort is normal.      Breath sounds: Normal breath sounds. No wheezing, rhonchi or rales.   Abdominal:      General: Abdomen is flat.      Palpations: Abdomen is soft.   Musculoskeletal:      Cervical back: Normal range of motion.      Right lower leg: No edema.      Left lower leg: No edema.      Comments: Ambulates without assistance  Strength 5/5 BLE  SLR positive right, negative left   Lymphadenopathy:      Cervical: No cervical adenopathy.   Skin:     General: Skin is warm and dry.   Neurological:      General: No focal deficit present.      Mental Status: He is alert and oriented to person, place, and time. Mental status is at baseline.   Psychiatric:         Mood and Affect: Mood normal.         Behavior: Behavior normal.         Assessment/Plan   Diagnoses and all orders for this visit:  Neurogenic claudication due to lumbar spinal stenosis  -     pregabalin (Lyrica) 200 mg capsule; Take 1 capsule (200 mg) by mouth 2 times a day.  -     FL pain management; Future  -     Transforaminal; Future  Other orders  -     iohexol (OMNIPaque) 300 mg iodine/mL solution 6 mL  -     lidocaine PF (Xylocaine) 20 mg/mL (2 %) injection 120 mg  -     lidocaine PF (Xylocaine) 20 mg/mL (2 %) injection 20 mg  -     sodium chloride (PF) 0.9% solution 1 mL  -     dexAMETHasone (PF) (Decadron) injection 10 mg  -     NPO Diet Except: Sips with  meds; Effective now; Standing  -     Height and weight; Standing  -     Type And Screen; Standing  -     Adult diet Regular; Standing  -     Vital Signs; Standing  -     Notify physician - Standard Parameters; Standing  -     Continue IV fluids ordered pre-procedure; Standing  -     Prior to Discharge O2 Weaning; Standing  -     Pulse oximetry, continuous; Standing  -     Discharge patient; Standing       The patient is a 28-year-old male with a past medical history significant for scoliosis and lumbar spine stenosis.  He is following up after his second lumbar MBB with no relief.  He currently rates the pain 5/10 across his lower back and radiating into his right groin and the anterior aspect of his right leg, also with numbness and tingling.  At his most recent visit he was continued on gabapentin 800 mg 3 times a day.  He denies side effects to this medication, and feels that it is helpful in taking the edge off of his pain.  We discussed trying a switch to an equivalent dose of Lyrica to see if this brings any further pain relief overall, patient is agreeable.  PDMP reviewed, Rx sent for the pregabalin 200 mg twice a day.  Instructed the patient that if this is ineffective in helping with his pain, to switch back to the same dose of gabapentin.  We also reviewed his imaging and discussed options based on the findings.  Considering his pain pattern, his physical exam, his failure to improve with reasonable conservative treatments including medications and physical therapy, I recommend pursuing a right sided L2-3 and L3-4 transforaminal epidural steroid injection under fluoroscopy.  The procedure was discussed, risks and benefits were discussed, patient is agreeable.  Med holds include vitamins/supplements for 1 week and ibuprofen for 1 day.  He will follow-up after the injection for reevaluation, call the clinic sooner if needed.       Pt arrives via EMS c/o left hand numbness since 23:00 last night. Pt is slow to respond and speech is slurred in amb bay, as per EMS, this is a new finding since on scene. A&Ox2-3. Pt endorses drinking daily, has not had a drink in 24 hrs, appears tremulous. PMH: DM, HTN, HLD.